# Patient Record
Sex: FEMALE | Race: WHITE | NOT HISPANIC OR LATINO | Employment: OTHER | URBAN - METROPOLITAN AREA
[De-identification: names, ages, dates, MRNs, and addresses within clinical notes are randomized per-mention and may not be internally consistent; named-entity substitution may affect disease eponyms.]

---

## 2018-06-15 LAB — CARBAMAZEPINE LEVEL (HISTORICAL): 8.3 UG/ML (ref 4–12)

## 2018-11-05 ENCOUNTER — TELEPHONE (OUTPATIENT)
Dept: NEUROLOGY | Facility: CLINIC | Age: 83
End: 2018-11-05

## 2018-11-05 NOTE — TELEPHONE ENCOUNTER
Left message for daughter to call back so we can move appointment from [phone] to [phone] same day as she needs longer time to discuss new issue  FYI  Daughter John Salmon calling to let you know that Kyleigh Luna was admitted in October to [facility] for a stroke and has a hospital follow up for the stroke with [facility] neurology  but does not want to follow there  She would like to have her mom continue to follow here with you as you are her normal neurologist so she would like to keep her appointment in December with you

## 2018-11-05 NOTE — TELEPHONE ENCOUNTER
She can be followed here  Will need full documentation as to her hospitalization  Will need full 1 hour appointment

## 2019-01-16 ENCOUNTER — APPOINTMENT (OUTPATIENT)
Dept: LAB | Facility: HOSPITAL | Age: 84
End: 2019-01-16
Payer: MEDICARE

## 2019-01-16 ENCOUNTER — OFFICE VISIT (OUTPATIENT)
Dept: NEUROLOGY | Facility: CLINIC | Age: 84
End: 2019-01-16
Payer: MEDICARE

## 2019-01-16 VITALS
HEIGHT: 63 IN | WEIGHT: 161.6 LBS | DIASTOLIC BLOOD PRESSURE: 86 MMHG | HEART RATE: 82 BPM | BODY MASS INDEX: 28.63 KG/M2 | RESPIRATION RATE: 17 BRPM | SYSTOLIC BLOOD PRESSURE: 152 MMHG

## 2019-01-16 DIAGNOSIS — I69.30 SEQUELAE, POST-STROKE: ICD-10-CM

## 2019-01-16 DIAGNOSIS — G43.009 MIGRAINE WITHOUT AURA AND WITHOUT STATUS MIGRAINOSUS, NOT INTRACTABLE: ICD-10-CM

## 2019-01-16 DIAGNOSIS — R56.9 SEIZURE (HCC): Primary | ICD-10-CM

## 2019-01-16 LAB — CARBAMAZEPINE SERPL-MCNC: 10.2 UG/ML (ref 4–12)

## 2019-01-16 PROCEDURE — 80156 ASSAY CARBAMAZEPINE TOTAL: CPT

## 2019-01-16 PROCEDURE — 36415 COLL VENOUS BLD VENIPUNCTURE: CPT

## 2019-01-16 PROCEDURE — 99214 OFFICE O/P EST MOD 30 MIN: CPT | Performed by: PSYCHIATRY & NEUROLOGY

## 2019-01-16 RX ORDER — AMLODIPINE BESYLATE 5 MG/1
5 TABLET ORAL
COMMUNITY
Start: 2018-10-08

## 2019-01-16 RX ORDER — ATORVASTATIN CALCIUM 80 MG/1
80 TABLET, FILM COATED ORAL
COMMUNITY
Start: 2018-10-08

## 2019-01-16 RX ORDER — TRIMETHOPRIM 100 MG/1
100 TABLET ORAL
COMMUNITY

## 2019-01-16 RX ORDER — SERTRALINE HYDROCHLORIDE 25 MG/1
25 TABLET, FILM COATED ORAL
COMMUNITY
Start: 2018-10-08

## 2019-01-16 RX ORDER — OMEPRAZOLE 20 MG/1
20 CAPSULE, DELAYED RELEASE ORAL 2 TIMES DAILY
COMMUNITY
Start: 2010-01-12

## 2019-01-16 RX ORDER — LISINOPRIL 40 MG/1
40 TABLET ORAL
COMMUNITY
Start: 2018-10-08

## 2019-01-16 RX ORDER — DIPHENOXYLATE HYDROCHLORIDE AND ATROPINE SULFATE 2.5; .025 MG/1; MG/1
1 TABLET ORAL
COMMUNITY

## 2019-01-16 RX ORDER — CARBAMAZEPINE 200 MG/1
200 CAPSULE, EXTENDED RELEASE ORAL
COMMUNITY
Start: 2018-10-08 | End: 2020-03-11 | Stop reason: SDUPTHER

## 2019-01-16 RX ORDER — HYDROCHLOROTHIAZIDE 12.5 MG/1
12.5 CAPSULE, GELATIN COATED ORAL
COMMUNITY
Start: 2018-10-08

## 2019-01-16 NOTE — ASSESSMENT & PLAN NOTE
Occurring as a single event during sleep in mid December 1999  Has had subsequent non epileptogenic EEGs

## 2019-01-16 NOTE — PROGRESS NOTES
Patient is here today for her migraine and her seizures    Patient ID: Rosalie Leonardo is a 82 y o  female  Assessment/Plan:    Seizure [facility])  Occurring as a single event during sleep in mid December 1999  Has had subsequent non epileptogenic EEGs  Migraine without aura and without status migrainosus, not intractable  Has been little troubled with significant headache issues for quite some time  We again reviewed the fact that the carbamazepine was initially started to cover seizures and that she certainly has at this point in time the opportunity to consider of the potential for withdrawal from that standpoint  However, on multiple past attempts at weaning, her migraines became significantly problematic and she wishes to stay on the carbamazepine for migraine prevention  --continue carbamazepine 200 mg twice daily  --check carbamazepine level  Sequelae, post-stroke  A new issue for our office here  Review of records reveals that in mid September she presented to [facility] with right-sided weakness and speech/language difficulties with subsequent studies demonstrating multiple small acute infarcts in scattered distributions suggesting a proximal source for emboli and very possibly cardiac  Miguel Ángel performed with no thrombus but with a dilated left atrium suggesting a potential cardiac origin  Working with Cardiology and at this point in time on Eliquis  --to continue follow-up with Cardiology including the potential for long-term rhythm monitoring and with the atherosclerotic changes noted on studies to discuss with Cardiology the potential for the addition of a low-dose aspirin regimen  --in the interim she remains on Eliquis  --she continues on atorvastatin  --given the fact that she does have residual language issues, we discussed the possibility of additional speech therapy to which she has agreed  That will be ordered       I spent a total of 30 min with the patient with greater than 50% of that time spent counseling and coordinating her care, specifically discussing her diagnosis, additional tests, and discussing the case with her care team, as detailed above  She will follow up in three months or p r n     Subjective:    HPI  Patient, age 82 years and right-handed, is been followed in the office here with a history of a single seizure occurring in December of 1999 as well as a history of migraine without aura  She was accompanied today by her daughter law  With regard to her seizure, it was indeed single, occur during sleep and has been non recurrent with subsequent non epileptogenic EEGs  She has, however, remained on carbamazepine 200 mg twice daily for her migraine, as whenever an attempt was made to wean her off the carbamazepine she had a significant escalation in her migraine issues  She has expressed no symptoms that would suggest any adverse side effects from the carbamazepine  She presents today, however, with a new issue  In mid September she presented to [facility] with acute onset right-sided weakness along with speech and language issues  This occurred apparently when she was at a [employer] activity  I was able to review studies performed  CTA demonstrated no large vessel occlusive disease  However, did demonstrate atherosclerotic change intracranially  MRI of brain was telling in that it demonstrated new multiple small areas of acute infarct involving several different distributions  This was felt to most likely represent a situation resulting from a proximal embolic shower  Subsequent to her hospitalization she underwent STEFANY which demonstrated no PFO but did demonstrate a dilated left atrium  Atrial fibrillation is considered a possible source, although has not as yet been documented  She is following closely with cardiology      Past Medical History:   Diagnosis Date    Dyslipidemia     Generalized seizure ([address] )     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    GERD (gastroesophageal reflux disease)     Hypertension     Migraine without aura     Recurrent UTI (urinary tract infection)     Vitamin D deficiency     measurable     Past Surgical History:   Procedure Laterality Date    CHOLECYSTECTOMY      TOTAL ABDOMINAL HYSTERECTOMY W/ BILATERAL SALPINGOOPHORECTOMY       Social History     Social History    Marital status: /Civil Union     Spouse name: N/A    Number of children: N/A    Years of education: N/A     Social History Main Topics    Smoking status: Never Smoker    Smokeless tobacco: Never Used    Alcohol use Yes    Drug use: Unknown    Sexual activity: Not Asked     Other Topics Concern    None     Social History Narrative    None     Family History   Problem Relation Age of Onset    Alzheimer's disease Mother [de-identified]        mid     Allergies   Allergen Reactions    Citalopram      Other reaction(s): Other (See Comments)  insomnia    Levofloxacin      Other reaction(s):  Other (See Comments)  "feels lousey"    Nitrofurantoin Other (See Comments)     diarrhea    Paroxetine Other (See Comments)     nausea    Pseudoephedrine Other (See Comments)     insomnia       Current Outpatient Prescriptions:     amLODIPine (NORVASC) 5 mg tablet, Take 5 mg by mouth, Disp: , Rfl:     apixaban (ELIQUIS) 5 mg, Take 5 mg by mouth, Disp: , Rfl:     atorvastatin (LIPITOR) 80 mg tablet, Take 80 mg by mouth, Disp: , Rfl:     calcium-vitamin D 250-100 MG-UNIT per tablet, Take 1 tablet by mouth, Disp: , Rfl:     carBAMazepine (CARBATROL) 200 mg 12 hr capsule, Take 200 mg by mouth, Disp: , Rfl:     Cholecalciferol 2000 units CAPS, Take 1 capsule by mouth, Disp: , Rfl:     hydrochlorothiazide (MICROZIDE) 12 5 mg capsule, Take 12 5 mg by mouth, Disp: , Rfl:     lisinopril (ZESTRIL) 40 mg tablet, Take 40 mg by mouth, Disp: , Rfl:     multivitamin (THERAGRAN) TABS, Take 1 tablet by mouth, Disp: , Rfl:     omeprazole (PRILOSEC) 20 mg delayed release capsule, Take 20 mg by mouth, Disp: , Rfl:     sertraline (ZOLOFT) 25 mg tablet, Take 25 mg by mouth, Disp: , Rfl:     trimethoprim (PROLOPRIM) 100 mg tablet, Take 100 mg by mouth, Disp: , Rfl:     Objective:    Blood pressure 152/86, pulse 82, resp  rate 17, height 5' 3" (1 6 m), weight 73 3 kg (161 lb 9 6 oz)  Physical Exam  Head normocephalic  Eyes nonicteric  Evidence of past cataract surgery noted  No audible anterior neck bruits  Lungs clear to auscultation  Rhythm regular with soft systolic murmur appreciated  GI (abdomen) soft nontender with bowel sounds present  No significant lower extremity edema  Neurological Exam  Alert  Pleasantly interactive  No overt dysarthria  However, in general conversation, she did have scattered paraphasic errors and in addition although following a multi step request accurately, was very slow in doing so  Gait tentative but independently gait capable  Romberg maneuver performed unremarkably  Cranial Nerves:   I:   Bilaterally an Oz make  II: Visual fields full to confrontation  Pupils with evidence of past cataract surgeries    Fundus: with bilaterally marginated discs  III,IV,VI: Extraocular muscles EOMI, no nystagmus  V: Masseter and pterygoid strength  Sensation in the V1 through V3 distributions intact to pinprick and light touch bilaterally  VII:   Mild right lower facial asymmetry present  VIII: Audition intact to finger rub bilaterally  IX/X: Uvula midline  Soft palate elevation symmetric  XI: Trapezius and SCM strength 5/5 bilaterally  XII: Tongue midline with no atrophy or fasciculations with appropriate movement  Accurate with finger-to-nose and heel-to-shin maneuvers bilaterally  Good symmetrical strength throughout the four extremities with no upper extremity drift  Sensory testing grossly intact to pin and position  No extinction with double simultaneous stimulation    Muscle stretch reflexes modestly increased on right as compared to left  Toe response upgoing bilaterally  ROS:    Review of Systems   Constitutional: Negative  Negative for appetite change and fever  HENT: Negative  Negative for hearing loss, tinnitus, trouble swallowing and voice change  Eyes: Negative  Negative for photophobia and pain  Respiratory: Negative  Negative for shortness of breath  Cardiovascular: Negative  Negative for palpitations  Gastrointestinal: Positive for constipation and nausea  Negative for vomiting  Endocrine: Negative  Negative for cold intolerance and heat intolerance  Genitourinary: Positive for frequency and urgency  Negative for dysuria  Musculoskeletal: Negative  Negative for myalgias and neck pain  Skin: Negative  Negative for rash  Neurological: Negative  Negative for dizziness, tremors, seizures, syncope, facial asymmetry, speech difficulty, weakness, light-headedness, numbness and headaches  Hematological: Negative  Does not bruise/bleed easily  Psychiatric/Behavioral: Positive for sleep disturbance  Negative for confusion and hallucinations  The patient is nervous/anxious  I personally reviewed the ROS that was entered by the medical assistant  *Please note this document was created using voice recognition software and may contain sound-alike word errors  *

## 2019-01-16 NOTE — ASSESSMENT & PLAN NOTE
Has been little troubled with significant headache issues for quite some time  We again reviewed the fact that the carbamazepine was initially started to cover seizures and that she certainly has at this point in time the opportunity to consider of the potential for withdrawal from that standpoint  However, on multiple past attempts at weaning, her migraines became significantly problematic and she wishes to stay on the carbamazepine for migraine prevention  --continue carbamazepine 200 mg twice daily  --check carbamazepine level

## 2019-01-16 NOTE — LETTER
January 16, 2019     Mariely Pan [address] [place] [address] Way  [address] [facility]    Patient: Jhonathan Morrell   YOB: 1935   Date of Visit: 1/16/2019       Dear Dr Valero Memos: Thank you for referring Brian Stewart to me for evaluation  Below are my notes for this consultation  If you have questions, please do not hesitate to call me  I look forward to following your patient along with you  Sincerely,        Adrian Ellington MD        CC: MD Adrian Tesfaye MD  1/16/2019 10:59 AM  Sign at close encounter  Patient is here today for her migraine and her seizures    Patient ID: Jhonathan Morrell is a 82 y o  female  Assessment/Plan:    Seizure [facility])  Occurring as a single event during sleep in mid December 1999  Has had subsequent non epileptogenic EEGs  Migraine without aura and without status migrainosus, not intractable  Has been little troubled with significant headache issues for quite some time  We again reviewed the fact that the carbamazepine was initially started to cover seizures and that she certainly has at this point in time the opportunity to consider of the potential for withdrawal from that standpoint  However, on multiple past attempts at weaning, her migraines became significantly problematic and she wishes to stay on the carbamazepine for migraine prevention  --continue carbamazepine 200 mg twice daily  --check carbamazepine level  Sequelae, post-stroke  A new issue for our office here  Review of records reveals that in mid September she presented to [facility] with right-sided weakness and speech/language difficulties with subsequent studies demonstrating multiple small acute infarcts in scattered distributions suggesting a proximal source for emboli and very possibly cardiac  Miguel Ángel performed with no thrombus but with a dilated left atrium suggesting a potential cardiac origin    Working with Cardiology and at this point in time on Eliquis  --to continue follow-up with Cardiology including the potential for long-term rhythm monitoring and with the atherosclerotic changes noted on studies to discuss with Cardiology the potential for the addition of a low-dose aspirin regimen  --in the interim she remains on Eliquis  --she continues on atorvastatin  --given the fact that she does have residual language issues, we discussed the possibility of additional speech therapy to which she has agreed  That will be ordered  I spent a total of 30 min with the patient with greater than 50% of that time spent counseling and coordinating her care, specifically discussing her diagnosis, additional tests, and discussing the case with her care team, as detailed above  She will follow up in three months or p r n     Subjective:    HPI  Patient, age 82 years and right-handed, is been followed in the office here with a history of a single seizure occurring in December of 1999 as well as a history of migraine without aura  She was accompanied today by her daughter law  With regard to her seizure, it was indeed single, occur during sleep and has been non recurrent with subsequent non epileptogenic EEGs  She has, however, remained on carbamazepine 200 mg twice daily for her migraine, as whenever an attempt was made to wean her off the carbamazepine she had a significant escalation in her migraine issues  She has expressed no symptoms that would suggest any adverse side effects from the carbamazepine  She presents today, however, with a new issue  In mid September she presented to [facility] with acute onset right-sided weakness along with speech and language issues  This occurred apparently when she was at a [employer] activity  I was able to review studies performed  CTA demonstrated no large vessel occlusive disease  However, did demonstrate atherosclerotic change intracranially    MRI of brain was telling in that it demonstrated new multiple small areas of acute infarct involving several different distributions  This was felt to most likely represent a situation resulting from a proximal embolic shower  Subsequent to her hospitalization she underwent STEFANY which demonstrated no PFO but did demonstrate a dilated left atrium  Atrial fibrillation is considered a possible source, although has not as yet been documented  She is following closely with cardiology  Past Medical History:   Diagnosis Date    Dyslipidemia     Generalized seizure ([address] )     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    GERD (gastroesophageal reflux disease)     Hypertension     Migraine without aura     Recurrent UTI (urinary tract infection)     Vitamin D deficiency     measurable     Past Surgical History:   Procedure Laterality Date    CHOLECYSTECTOMY      TOTAL ABDOMINAL HYSTERECTOMY W/ BILATERAL SALPINGOOPHORECTOMY       Social History     Social History    Marital status: /Civil Union     Spouse name: N/A    Number of children: N/A    Years of education: N/A     Social History Main Topics    Smoking status: Never Smoker    Smokeless tobacco: Never Used    Alcohol use Yes    Drug use: Unknown    Sexual activity: Not Asked     Other Topics Concern    None     Social History Narrative    None     Family History   Problem Relation Age of Onset    Alzheimer's disease Mother [de-identified]        mid     Allergies   Allergen Reactions    Citalopram      Other reaction(s): Other (See Comments)  insomnia    Levofloxacin      Other reaction(s):  Other (See Comments)  "feels lousey"    Nitrofurantoin Other (See Comments)     diarrhea    Paroxetine Other (See Comments)     nausea    Pseudoephedrine Other (See Comments)     insomnia       Current Outpatient Prescriptions:     amLODIPine (NORVASC) 5 mg tablet, Take 5 mg by mouth, Disp: , Rfl:     apixaban (ELIQUIS) 5 mg, Take 5 mg by mouth, Disp: , Rfl:    atorvastatin (LIPITOR) 80 mg tablet, Take 80 mg by mouth, Disp: , Rfl:     calcium-vitamin D 250-100 MG-UNIT per tablet, Take 1 tablet by mouth, Disp: , Rfl:     carBAMazepine (CARBATROL) 200 mg 12 hr capsule, Take 200 mg by mouth, Disp: , Rfl:     Cholecalciferol 2000 units CAPS, Take 1 capsule by mouth, Disp: , Rfl:     hydrochlorothiazide (MICROZIDE) 12 5 mg capsule, Take 12 5 mg by mouth, Disp: , Rfl:     lisinopril (ZESTRIL) 40 mg tablet, Take 40 mg by mouth, Disp: , Rfl:     multivitamin (THERAGRAN) TABS, Take 1 tablet by mouth, Disp: , Rfl:     omeprazole (PRILOSEC) 20 mg delayed release capsule, Take 20 mg by mouth, Disp: , Rfl:     sertraline (ZOLOFT) 25 mg tablet, Take 25 mg by mouth, Disp: , Rfl:     trimethoprim (PROLOPRIM) 100 mg tablet, Take 100 mg by mouth, Disp: , Rfl:     Objective:    Blood pressure 152/86, pulse 82, resp  rate 17, height 5' 3" (1 6 m), weight 73 3 kg (161 lb 9 6 oz)  Physical Exam  Head normocephalic  Eyes nonicteric  Evidence of past cataract surgery noted  No audible anterior neck bruits  Lungs clear to auscultation  Rhythm regular with soft systolic murmur appreciated  GI (abdomen) soft nontender with bowel sounds present  No significant lower extremity edema  Neurological Exam  Alert  Pleasantly interactive  No overt dysarthria  However, in general conversation, she did have scattered paraphasic errors and in addition although following a multi step request accurately, was very slow in doing so  Gait tentative but independently gait capable  Romberg maneuver performed unremarkably  Cranial Nerves:   I:   Bilaterally an Oz make  II: Visual fields full to confrontation  Pupils with evidence of past cataract surgeries    Fundus: with bilaterally marginated discs  III,IV,VI: Extraocular muscles EOMI, no nystagmus  V: Masseter and pterygoid strength  Sensation in the V1 through V3 distributions intact to pinprick and light touch bilaterally     VII: Mild right lower facial asymmetry present  VIII: Audition intact to finger rub bilaterally  IX/X: Uvula midline  Soft palate elevation symmetric  XI: Trapezius and SCM strength 5/5 bilaterally  XII: Tongue midline with no atrophy or fasciculations with appropriate movement  Accurate with finger-to-nose and heel-to-shin maneuvers bilaterally  Good symmetrical strength throughout the four extremities with no upper extremity drift  Sensory testing grossly intact to pin and position  No extinction with double simultaneous stimulation  Muscle stretch reflexes modestly increased on right as compared to left  Toe response upgoing bilaterally  ROS:    Review of Systems   Constitutional: Negative  Negative for appetite change and fever  HENT: Negative  Negative for hearing loss, tinnitus, trouble swallowing and voice change  Eyes: Negative  Negative for photophobia and pain  Respiratory: Negative  Negative for shortness of breath  Cardiovascular: Negative  Negative for palpitations  Gastrointestinal: Positive for constipation and nausea  Negative for vomiting  Endocrine: Negative  Negative for cold intolerance and heat intolerance  Genitourinary: Positive for frequency and urgency  Negative for dysuria  Musculoskeletal: Negative  Negative for myalgias and neck pain  Skin: Negative  Negative for rash  Neurological: Negative  Negative for dizziness, tremors, seizures, syncope, facial asymmetry, speech difficulty, weakness, light-headedness, numbness and headaches  Hematological: Negative  Does not bruise/bleed easily  Psychiatric/Behavioral: Positive for sleep disturbance  Negative for confusion and hallucinations  The patient is nervous/anxious  I personally reviewed the ROS that was entered by the medical assistant  *Please note this document was created using voice recognition software and may contain sound-alike word errors  *

## 2019-01-16 NOTE — ASSESSMENT & PLAN NOTE
A new issue for our office here  Review of records reveals that in mid September she presented to [facility] with right-sided weakness and speech/language difficulties with subsequent studies demonstrating multiple small acute infarcts in scattered distributions suggesting a proximal source for emboli and very possibly cardiac  Miguel Ángel performed with no thrombus but with a dilated left atrium suggesting a potential cardiac origin  Working with Cardiology and at this point in time on Eliquis  --to continue follow-up with Cardiology including the potential for long-term rhythm monitoring and with the atherosclerotic changes noted on studies to discuss with Cardiology the potential for the addition of a low-dose aspirin regimen  --in the interim she remains on Eliquis  --she continues on atorvastatin  --given the fact that she does have residual language issues, we discussed the possibility of additional speech therapy to which she has agreed  That will be ordered

## 2019-02-28 ENCOUNTER — EVALUATION (OUTPATIENT)
Dept: SPEECH THERAPY | Facility: REHABILITATION | Age: 84
End: 2019-02-28
Payer: MEDICARE

## 2019-02-28 DIAGNOSIS — I69.320 APHASIA AS LATE EFFECT OF CEREBROVASCULAR ACCIDENT (CVA): Primary | ICD-10-CM

## 2019-02-28 DIAGNOSIS — R48.9 UNSPECIFIED SYMBOLIC DYSFUNCTIONS: ICD-10-CM

## 2019-02-28 DIAGNOSIS — I69.30 SEQUELAE, POST-STROKE: ICD-10-CM

## 2019-02-28 PROCEDURE — 96105 ASSESSMENT OF APHASIA: CPT

## 2019-02-28 NOTE — PROGRESS NOTES
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 Ambulatory referral to Speech Therapy   3  Unspecified symbolic dysfunctions E12 9        Subjective Comments: Ms Lee Aviles arrived today for a speech evaluation s/p CVA  She arrived on time  Patient reported she had a CVA last September, she was in the hospital for approximately 4 weeks and received acute rehab  Then upon discharge home received home health care (PT/OT/ST) for about 3 weeks  Pt was seen by neurology on 2019, the following information was obtained from chart notes: "Patient, age 82 years and right-handed, is been followed in the office here with a history of a single seizure occurring in 1999 as well as a history of migraine without aura  She was accompanied today by her daughter law  With regard to her seizure, it was indeed single, occur during sleep and has been non recurrent with subsequent non epileptogenic EEGs  She has, however, remained on carbamazepine 200 mg twice daily for her migraine, as whenever an attempt was made to wean her off the carbamazepine she had a significant escalation in her migraine issues  She has expressed no symptoms that would suggest any adverse side effects from the carbamazepine  She presents today, however, with a new issue  In mid September she presented to [facility] with acute onset right-sided weakness along with speech and language issues  This occurred apparently when she was at a [employer] activity  I was able to review studies performed  CTA demonstrated no large vessel occlusive disease  However, did demonstrate atherosclerotic change intracranially    MRI of brain was telling in that it demonstrated new multiple small areas of acute infarct involving several different distributions  This was felt to most likely represent a situation resulting from a proximal embolic shower  Subsequent to her hospitalization she underwent STEFANY which demonstrated no PFO but did demonstrate a dilated left atrium  Atrial fibrillation is considered a possible source, although has not as yet been documented  She is following closely with cardiology "     Pt reports that she has difficulty getting words out when talking, it gets worse when she talks fast  Patient was observed to have delayed word retrieval during interview and occasional paraphasic errors  She reported difficulty following along with television and/or a movie stating "it goes too fast" as well as difficulty with auditory comprehension when reading  Pt reported difficulty with use of external memory aids to keep track of appointments etc  Pt denies difficulty with swallowing  Very mild facial asymmetry observed on the right side  Pt reports she feels oral motor strength is baseline  Pt reports change in handwriting and decreased right hand strength since CVA, Pt would like to participate in Occupational Therapy evaluation  Safety Measures: none  Patient Goal: "I would like to make the way I talk slower so that I can have my words come out better"       Reason for Referral:Change in cognitive status and Difficulty producing fluent speech  Prior Functional Status:Communication effective and appropriate in all situations  Medical History significant for:   Past Medical History:   Diagnosis Date    Dyslipidemia     Generalized seizure ([address] )     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    GERD (gastroesophageal reflux disease)     Hypertension     Migraine without aura     Recurrent UTI (urinary tract infection)     Vitamin D deficiency     measurable     Clinically Complex Situations: N/A    Hearing:Not Tested, Pt is considering having hearing assessed  Vision:WNL, pt wears reading glasses  Medication List:   Current Outpatient Medications   Medication Sig Dispense Refill    amLODIPine (NORVASC) 5 mg tablet Take 5 mg by mouth      apixaban (ELIQUIS) 5 mg Take 5 mg by mouth      atorvastatin (LIPITOR) 80 mg tablet Take 80 mg by mouth      calcium-vitamin D 250-100 MG-UNIT per tablet Take 1 tablet by mouth      carBAMazepine (CARBATROL) 200 mg 12 hr capsule Take 200 mg by mouth      Cholecalciferol 2000 units CAPS Take 1 capsule by mouth      hydrochlorothiazide (MICROZIDE) 12 5 mg capsule Take 12 5 mg by mouth      lisinopril (ZESTRIL) 40 mg tablet Take 40 mg by mouth      multivitamin (THERAGRAN) TABS Take 1 tablet by mouth      omeprazole (PRILOSEC) 20 mg delayed release capsule Take 20 mg by mouth      sertraline (ZOLOFT) 25 mg tablet Take 25 mg by mouth      trimethoprim (PROLOPRIM) 100 mg tablet Take 100 mg by mouth       No current facility-administered medications for this visit  Allergies: Allergies   Allergen Reactions    Citalopram      Other reaction(s): Other (See Comments)  insomnia    Levofloxacin      Other reaction(s): Other (See Comments)  "feels lousey"    Nitrofurantoin Other (See Comments)     diarrhea    Paroxetine Other (See Comments)     nausea    Pseudoephedrine Other (See Comments)     insomnia     Primary Language: English  Preferred Language: English     Home Environment/ Lifestyle: Pt lives at home alone  She has two sons who live close by and neighbors who are close with her and check in frequently  Pt's daughter in law manages her finances and manages her medications  Her daughter in law organizes pills into pill box every 3 weeks  Pt reports with use of pill box, she takes her medications regularly   Pt prepares her own meals, has not had any issues with leaving stove on etc       Highest Level of Education: HS diploma     Current / Prior Services being received: Physical Therapy, Occupational Therapy  and Speech Therapy Acute hospital setting and home health care  Home health care stopped before 2018  Mental Status: Alert  Behavior Status:Cooperative  Communication Modalities: Verbal  Recent Speech/ Language therapy:Acute hospital setting  and Home  Rehabilitation Prognosis:Good rehab potential to reach the established goals    Assessments:Language Assessment   Standardized testing:   IE: 2019: The Western Aphasia Battery-Revised (WAB-R) is designed to evaluate a patient's language function following CVA, dementia, or other acquired neurological disorder  It measures a patients linguistic skills, such as speech content, fluency, auditory comprehension, repetition, naming, reading, and writing  The WAB-R also measures a patients nonlinguistic skills, including drawing, calculation, block design, and apraxia  The purpose of this standardized assessment is to (1) determine the presence, severity, and type of aphasia; (2) measure the patient's level of performance to provide a baseline for detecting change over time; (3) provide a comprehensive assessment of the patient's language assets and deficits in order to guide treatment and management; and (4) infer the location and etiology of the lesion causing aphasia  The following results were obtained during the administration of the assessment:     PART 1: Score:   -Spontaneous Speech:    -Auditory Verbal Comprehension: 9 85/10   -Repetition: 9 8/10   -Naming & Word Findin 3/10     *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  Due to time constraints, only portions of the standardized assessment were administered on this date of service      PART 2: Score:   -Reading Score: NT/20   -Writing: NT/20   -Apraxia: NT/10   -Constructional, Visuospatial, & Calculation: NT/10        Score:   *Aphasia Quotient (AQ): 91 9/100   *Language Quotient (LQ): NT/100   *Cortical Quotient (CQ): NT/100 Goals  Short Term Goals:  1  Pt will name 10+ words that begin with a specific letter in 60 seconds using compensatory strategies and min cues to improve word retrieval skills  2  Pt will name 15+ items in a category in 60 seconds using compensatory strategies and min cues to improve word retrieval skills  3  Patient will be educated on word finding strategies for improved generative naming and verbal expression skills with 80% accuracy  4  Patient will name up to 3 synonyms for a given word with 80% accuracy to build expressive vocabulary for conversation  5  Patient will name an appropriate antonym for a given word with 80% accuracy to build expressive vocabulary for conversation  6  Patient will answer [address]- questions regarding story read aloud to improve auditory comprehension and recall with 80% acc  7  Patient will be educated on use of external memory aids and compensatory strategies to recall routine, personal information and recent events to improve orientation to time and recall daily events with 80% accuracy  Long Term Goals:  1  Patient will demonstrate adequate verbal expression during conversation without breakdowns or word finding deficits  2  Patient will demonstrate cognitive-communication skills consistent with age and education given use of compensatory strategies when needed to resume baseline activities and responsibilities in home and community settings  Impressions/ Recommendations  Impressions: Pt presents with mild anomic aphasia s/p CVA as well as difficulty with auditory comprehension and memory  Pt reports changes in her handwriting and decreased right hand strength since CVA, she may benefit from participation in an Occupational therapy evaluation       Recommendations:   Patients would benefit from: Cognitive-Linguistic therapy   Frequency:1-2x weekly   Duration:4-5 weeks    Intervention certification from: 8/18/0002  Intervention certification to: 3/28/2019

## 2019-03-05 ENCOUNTER — OFFICE VISIT (OUTPATIENT)
Dept: SPEECH THERAPY | Facility: REHABILITATION | Age: 84
End: 2019-03-05
Payer: MEDICARE

## 2019-03-05 DIAGNOSIS — I69.30 SEQUELAE, POST-STROKE: ICD-10-CM

## 2019-03-05 DIAGNOSIS — R48.9 UNSPECIFIED SYMBOLIC DYSFUNCTIONS: ICD-10-CM

## 2019-03-05 DIAGNOSIS — I69.320 APHASIA AS LATE EFFECT OF CEREBROVASCULAR ACCIDENT (CVA): Primary | ICD-10-CM

## 2019-03-05 PROCEDURE — 92507 TX SP LANG VOICE COMM INDIV: CPT

## 2019-03-05 NOTE — PROGRESS NOTES
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    3  Unspecified symbolic dysfunctions L79 5      Visit Tracking:  -Visit # 2  -Insurance: Medicare   -RE due: 3/28/2019    Subjective/Behavioral: 1:1 ST x 60 min  Pt arrived independently today and on time for session  Reviewed results of IE and plan of care  Pt in agreement with plan at this time  Pt was also administered the reading and writing supplemental portions of the WAB  Scores are shown below  Standardized testing: The Western Aphasia Battery-Revised (WAB-R) is designed to evaluate a patient's language function following CVA, dementia, or other acquired neurological disorder  It measures a patients linguistic skills, such as speech content, fluency, auditory comprehension, repetition, naming, reading, and writing  The WAB-R also measures a patients nonlinguistic skills, including drawing, calculation, block design, and apraxia  The purpose of this standardized assessment is to (1) determine the presence, severity, and type of aphasia; (2) measure the patient's level of performance to provide a baseline for detecting change over time; (3) provide a comprehensive assessment of the patient's language assets and deficits in order to guide treatment and management; and (4) infer the location and etiology of the lesion causing aphasia   The following results were obtained during the administration of the assessment:      PART 1: Score:   -Spontaneous Speech:    -Auditory Verbal Comprehension: 9 /10   -Repetition: 9 8/10   -Naming & Word Findin 3/10      *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      Due to time constraints, only portions of the standardized assessment were administered at time of IE, during diagnostic therapy session pt was administered the reading and writing subtest      PART 2: Score:   -Reading Score: 18    -Writin    -Apraxia: NT/10   -Constructional, Visuospatial, & Calculation: NT10           Score:   *Aphasia Quotient (AQ): 91 9/100   *Language Quotient (LQ): 78 95/100   *Cortical Quotient (CQ): NT/100      Goals  Short Term Goals:  1  Pt will name 10+ words that begin with a specific letter in 60 seconds using compensatory strategies and min cues to improve word retrieval skills  2  Pt will name 15+ items in a category in 60 seconds using compensatory strategies and min cues to improve word retrieval skills  TARGETED  -Pt was given a category and was asked to [place] the items that would belong in that category from choice of 9, task completed independently with 97% acc  Then, pt was given list of categories and list of words, she was asked to sort them appropriately, task completed independently with 100% acc  Next, patient was asked to name three items in concrete category, she independently completed task with 94% acc  3  Patient will be educated on word finding strategies for improved generative naming and verbal expression skills with 80% accuracy  TARGETED  -Pt was educated on use of word finding strategies, pt was provided with handout as well as verbal explanation  4  Patient will name up to 3 synonyms for a given word with 80% accuracy to build expressive vocabulary for conversation  TARGETED  -Pt was given a target word and was asked to provide one word that meant the same, this was challenging for patient  She independently completed with 50% acc  Given mod-max clinician cues, she was able to increase success  5  Patient will name an appropriate antonym for a given word with 80% accuracy to build expressive vocabulary for conversation  TARGETED  -Pt was given a target word and was asked to provide one word that meant the opposite, she independently completed with 73% acc  Given mid-mod verbal and phonemic clinician cues she was able to increase success  6  Patient will answer [facility]- questions regarding story read aloud to improve auditory comprehension and recall with 80% acc  7  Patient will be educated on use of external memory aids and compensatory strategies to recall routine, personal information and recent events to improve orientation to time and recall daily events with 80% accuracy          Long Term Goals:  1  Patient will demonstrate adequate verbal expression during conversation without breakdowns or word finding deficits  2  Patient will demonstrate cognitive-communication skills consistent with age and education given use of compensatory strategies when needed to resume baseline activities and responsibilities in home and community settings  Other:Patient was provided with home exercises/ activies to target goals in plan of care    Recommendations:Continue with Plan of Care

## 2019-03-12 ENCOUNTER — OFFICE VISIT (OUTPATIENT)
Dept: SPEECH THERAPY | Facility: REHABILITATION | Age: 84
End: 2019-03-12
Payer: MEDICARE

## 2019-03-12 DIAGNOSIS — I69.30 SEQUELAE, POST-STROKE: ICD-10-CM

## 2019-03-12 DIAGNOSIS — I69.320 APHASIA AS LATE EFFECT OF CEREBROVASCULAR ACCIDENT (CVA): ICD-10-CM

## 2019-03-12 DIAGNOSIS — R48.9 UNSPECIFIED SYMBOLIC DYSFUNCTIONS: Primary | ICD-10-CM

## 2019-03-12 PROCEDURE — 92507 TX SP LANG VOICE COMM INDIV: CPT

## 2019-03-12 NOTE — PROGRESS NOTES
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      Visit Tracking:  -Visit # 3  -Insurance: Medicare   -RE due: 3/28/2019    Subjective/Behavioral: 1:1 ST x 55 min  Pt arrived independently today and on time for session  Pt arrived to session, with HEP completed  She reported it was easy, however was encouraged to look at her work again as she did not complete with good accuracy  Upon second look, she was able to improve her accuracy  Standardized testing: The Western Aphasia Battery-Revised (WAB-R) is designed to evaluate a patient's language function following CVA, dementia, or other acquired neurological disorder  It measures a patients linguistic skills, such as speech content, fluency, auditory comprehension, repetition, naming, reading, and writing  The WAB-R also measures a patients nonlinguistic skills, including drawing, calculation, block design, and apraxia  The purpose of this standardized assessment is to (1) determine the presence, severity, and type of aphasia; (2) measure the patient's level of performance to provide a baseline for detecting change over time; (3) provide a comprehensive assessment of the patient's language assets and deficits in order to guide treatment and management; and (4) infer the location and etiology of the lesion causing aphasia   The following results were obtained during the administration of the assessment:      PART 1: Score:   -Spontaneous Speech:    -Auditory Verbal Comprehension: 9 /10   -Repetition: 9 8/10   -Naming & Word Findin 3/10      *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      Due to time constraints, only portions of the standardized assessment were administered at time of IE, during diagnostic therapy session pt was administered the reading and writing subtest      PART 2: Score:   -Reading Score: 18    -Writin    -Apraxia: NT/10   -Constructional, Visuospatial, & Calculation: NT10           Score:   *Aphasia Quotient (AQ): 91 9/100   *Language Quotient (LQ): 78 95/100   *Cortical Quotient (CQ): NT/100      Goals  Short Term Goals:  1  Pt will name 10+ words that begin with a specific letter in 60 seconds using compensatory strategies and min cues to improve word retrieval skills  TARGETED  -Pt was engaged in game of scattergories, timer was not used to allow patient time to complete task  Pt had difficulty with this task, She was able to provide on average 6 words  She benefited from mod-max clinician cues to increase success  2  Pt will name 15+ items in a category in 60 seconds using compensatory strategies and min cues to improve word retrieval skills  -DNT, previous session data: Pt was given a category and was asked to [place] the items that would belong in that category from choice of 9, task completed independently with 97% acc  Then, pt was given list of categories and list of words, she was asked to sort them appropriately, task completed independently with 100% acc  Next, patient was asked to name three items in concrete category, she independently completed task with 94% acc  3  Patient will be educated on word finding strategies for improved generative naming and verbal expression skills with 80% accuracy  TARGETED  -Pt was re-educated on use of word finding strategies, pt was reminded of handout that was provided last session  Pt with no recall of being given paper or information on it  4  Patient will name up to 3 synonyms for a given word with 80% accuracy to build expressive vocabulary for conversation   TARGETED  -Pt was given a target word and was asked to provide one word that meant the same, pt independently completed with 83% acc  Given min verbal cues she was able to increase success to 100%  This was much better than last session  5  Patient will name an appropriate antonym for a given word with 80% accuracy to build expressive vocabulary for conversation  TARGETED  -Pt was given a target word and was asked to provide one word that meant the opposite, she independently completed with 100% acc  6  Patient will answer [address]- questions regarding story read aloud to improve auditory comprehension and recall with 80% acc  TARGETED  -Pt was read aloud level 2 passages from functional memory manual, she was encouraged to use association to improve recall  Task completed independently with 75% acc  Given multiple choice answers, she was able to increase accuracy to 87%  7  Patient will be educated on use of external memory aids and compensatory strategies to recall routine, personal information and recent events to improve orientation to time and recall daily events with 80% accuracy  TARGETED  -Pt was educated on the use of external memory strategies, she was also provided with handout  Pt reported she uses a calendar, pill box, and makes grocery lists  She reported that she sometimes forgets her shopping list and has to go back to the store  She was encouraged to use check list and place it by her purse, she should look at the checklist before leaving the house to make sure she has everything she needs  Exampled of list was created and provided to patient          Long Term Goals:  1  Patient will demonstrate adequate verbal expression during conversation without breakdowns or word finding deficits  2  Patient will demonstrate cognitive-communication skills consistent with age and education given use of compensatory strategies when needed to resume baseline activities and responsibilities in home and community settings       Other:Patient was provided with home exercises/ activies to target goals in plan of care    Recommendations:Continue with Plan of Care

## 2019-03-14 ENCOUNTER — TELEPHONE (OUTPATIENT)
Dept: NEUROLOGY | Facility: CLINIC | Age: 84
End: 2019-03-14

## 2019-03-14 DIAGNOSIS — R13.19 OTHER DYSPHAGIA: Primary | ICD-10-CM

## 2019-03-19 ENCOUNTER — OFFICE VISIT (OUTPATIENT)
Dept: SPEECH THERAPY | Facility: REHABILITATION | Age: 84
End: 2019-03-19
Payer: MEDICARE

## 2019-03-19 DIAGNOSIS — I69.30 SEQUELAE, POST-STROKE: ICD-10-CM

## 2019-03-19 DIAGNOSIS — R48.9 UNSPECIFIED SYMBOLIC DYSFUNCTIONS: Primary | ICD-10-CM

## 2019-03-19 DIAGNOSIS — I69.320 APHASIA AS LATE EFFECT OF CEREBROVASCULAR ACCIDENT (CVA): ICD-10-CM

## 2019-03-19 PROCEDURE — 92507 TX SP LANG VOICE COMM INDIV: CPT

## 2019-03-19 NOTE — PROGRESS NOTES
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      Visit Tracking:  -Visit # 4  -Insurance: Medicare   -RE due: 3/28/2019    Subjective/Behavioral: 1:1 ST x 55 min  Pt arrived independently today and on time for session  She reported she has started to use visual reminders (list by the door) to help her recall what to do prior to leaving the house  Pt arrived to session, with HEP completed  She reported it was fairly easily, "I had to think about it", she completed it with 93% acc  Pt was confused about needing script, unsure what transpired however reviewed with patient about having an OT script and needing an eval scheduled  Standardized testing: The Western Aphasia Battery-Revised (WAB-R) is designed to evaluate a patient's language function following CVA, dementia, or other acquired neurological disorder  It measures a patients linguistic skills, such as speech content, fluency, auditory comprehension, repetition, naming, reading, and writing  The WAB-R also measures a patients nonlinguistic skills, including drawing, calculation, block design, and apraxia  The purpose of this standardized assessment is to (1) determine the presence, severity, and type of aphasia; (2) measure the patient's level of performance to provide a baseline for detecting change over time; (3) provide a comprehensive assessment of the patient's language assets and deficits in order to guide treatment and management; and (4) infer the location and etiology of the lesion causing aphasia   The following results were obtained during the administration of the assessment:      PART 1: Score:   -Spontaneous Speech: 20   -Auditory Verbal Comprehension: 9 10   -Repetition: 9 8/10   -Naming & Word Findin 3/10      *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      Due to time constraints, only portions of the standardized assessment were administered at time of IE, during diagnostic therapy session pt was administered the reading and writing subtest      PART 2: Score:   -Reading Score: 18    -Writin    -Apraxia: NT/10   -Constructional, Visuospatial, & Calculation: NT/10           Score:   *Aphasia Quotient (AQ): 91 9   *Language Quotient (LQ): 78 95/100   *Cortical Quotient (CQ): NT/100      Goals  Short Term Goals:  1  Pt will name 10+ words that begin with a specific letter in 60 seconds using compensatory strategies and min cues to improve word retrieval skills  TARGETED  -Pt was given a description and was asked to name the word being described, all answers started with the letter A, this task was very challenging for patient  She independently completed with 40% acc  Given mod-max verbal cues she was able to increase success  2  Pt will name 15+ items in a category in 60 seconds using compensatory strategies and min cues to improve word retrieval skills  TARGETED  -Pt was given a category and was asked to provide three items that belong in the category, task completed independently with 63% acc  Given min-mod verbal cues, pt was able to increase accuracy  3  Patient will be educated on word finding strategies for improved generative naming and verbal expression skills with 80% accuracy  TARGETED  -Pt was re-educated on use of word finding strategies, pt was reminded of handout that was provided last session as well as the use of first letter cue, descriptions and visualizations to help with word finding  4  Patient will name up to 3 synonyms for a given word with 80% accuracy to build expressive vocabulary for conversation   TARGETED  - Pt was given a target word and was asked to provide one word that meant the same, pt independently completed in 2/12 opp  Given mod-max verbal cues she was able to increase success  5  Patient will name an appropriate antonym for a given word with 80% accuracy to build expressive vocabulary for conversation  -DNT, previous session data: Pt was given a target word and was asked to provide one word that meant the opposite, she independently completed with 100% acc  6  Patient will answer [facility]- questions regarding story read aloud to improve auditory comprehension and recall with 80% acc  TARGETED  -Pt was read aloud level 2 passages from functional memory manual, she was encouraged to use association to improve recall  Task completed independently with 83% acc  Given multiple choice answers, she was able to increase accuracy to 100%  7  Patient will be educated on use of external memory aids and compensatory strategies to recall routine, personal information and recent events to improve orientation to time and recall daily events with 80% accuracy  TARGETED  -Discussed the use of external memory strategies  Pt reported she uses a calendar, pill box, and makes grocery lists  Last session she was encouraged to use check list and place it by her purse, she should look at the checklist before leaving the house to make sure she has everything she needs  Pt reported that she has done this and it is working well        Long Term Goals:  1  Patient will demonstrate adequate verbal expression during conversation without breakdowns or word finding deficits  2  Patient will demonstrate cognitive-communication skills consistent with age and education given use of compensatory strategies when needed to resume baseline activities and responsibilities in home and community settings  Other:Patient was provided with home exercises/ activies to target goals in plan of care    Recommendations:Continue with Plan of Care

## 2019-03-26 ENCOUNTER — OFFICE VISIT (OUTPATIENT)
Dept: SPEECH THERAPY | Facility: REHABILITATION | Age: 84
End: 2019-03-26
Payer: MEDICARE

## 2019-03-26 DIAGNOSIS — I69.320 APHASIA AS LATE EFFECT OF CEREBROVASCULAR ACCIDENT (CVA): ICD-10-CM

## 2019-03-26 DIAGNOSIS — I69.30 SEQUELAE, POST-STROKE: ICD-10-CM

## 2019-03-26 DIAGNOSIS — R48.9 UNSPECIFIED SYMBOLIC DYSFUNCTIONS: Primary | ICD-10-CM

## 2019-03-26 PROCEDURE — 92507 TX SP LANG VOICE COMM INDIV: CPT

## 2019-03-26 NOTE — PROGRESS NOTES
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 320      Visit Tracking:  -Visit # 5  -Insurance: Medicare   -RE due: 3/28/2019    Subjective/Behavioral: 1:1 ST x 55 min  Pt arrived independently today and on time for session  Pt is independently using strategies to help with recall, she stated "I need to go to the store and get two things that start with /m/ martin and milk"  Pt arrived to session, with HEP completed  RE-EVAL due next session  Standardized testing: The Western Aphasia Battery-Revised (WAB-R) is designed to evaluate a patient's language function following CVA, dementia, or other acquired neurological disorder  It measures a patients linguistic skills, such as speech content, fluency, auditory comprehension, repetition, naming, reading, and writing  The WAB-R also measures a patients nonlinguistic skills, including drawing, calculation, block design, and apraxia  The purpose of this standardized assessment is to (1) determine the presence, severity, and type of aphasia; (2) measure the patient's level of performance to provide a baseline for detecting change over time; (3) provide a comprehensive assessment of the patient's language assets and deficits in order to guide treatment and management; and (4) infer the location and etiology of the lesion causing aphasia   The following results were obtained during the administration of the assessment:      PART 1: Score:   -Spontaneous Speech:    -Auditory Verbal Comprehension: /10   -Repetition: 9 8/10   -Naming & Word Findin 3/10      *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      Due to time constraints, only portions of the standardized assessment were administered at time of IE, during diagnostic therapy session pt was administered the reading and writing subtest      PART 2: Score:   -Reading Score: 18    -Writin    -Apraxia: NT/10   -Constructional, Visuospatial, & Calculation: NT10           Score:   *Aphasia Quotient (AQ): 91 9/100   *Language Quotient (LQ): 78 95/100   *Cortical Quotient (CQ): NT/100      Goals  Short Term Goals:  1  Pt will name 10+ words that begin with a specific letter in 60 seconds using compensatory strategies and min cues to improve word retrieval skills  -DNT, previous session data: Pt was given a description and was asked to name the word being described, all answers started with the letter A, this task was very challenging for patient  She independently completed with 40% acc  Given mod-max verbal cues she was able to increase success  2  Pt will name 15+ items in a category in 60 seconds using compensatory strategies and min cues to improve word retrieval skills  TARGETED  -Pt was given a category and was asked to provide as many items in the category as she could, on average she was able to provide an average of 8 items per category  She benefited from increased time to complete task, as well as mod verbal cues to increase success  3  Patient will be educated on word finding strategies for improved generative naming and verbal expression skills with 80% accuracy  TARGETED  -Pt was re-educated on use of word finding strategies, pt was reminded of handout that was provided in previous session as well as the use of first letter cue, descriptions and visualizations to help with word finding  4  Patient will name up to 3 synonyms for a given word with 80% accuracy to build expressive vocabulary for conversation  TARGETED  - Pt was given a word chaining task, she independently completed with 60% acc, she benefited from clinician cues to increase accuracy        5  Patient will name an appropriate antonym for a given word with 80% accuracy to build expressive vocabulary for conversation  -DNT, previous session data: Pt was given a target word and was asked to provide one word that meant the opposite, she independently completed with 100% acc  6  Patient will answer [facility]- questions regarding story read aloud to improve auditory comprehension and recall with 80% acc  -DNT, previous session data:Pt was read aloud level 2 passages from functional memory manual, she was encouraged to use association to improve recall  Task completed independently with 83% acc  Given multiple choice answers, she was able to increase accuracy to 100%  7  Patient will be educated on use of external memory aids and compensatory strategies to recall routine, personal information and recent events to improve orientation to time and recall daily events with 80% accuracy  TARGETED  -Discussed the use of external memory strategies  Pt reported she uses a calendar, pill box, and makes grocery lists  Last session she was encouraged to use check list and place it by her purse, she should look at the checklist before leaving the house to make sure she has everything she needs  Pt reported that she has done this and it is working well        Long Term Goals:  1  Patient will demonstrate adequate verbal expression during conversation without breakdowns or word finding deficits  2  Patient will demonstrate cognitive-communication skills consistent with age and education given use of compensatory strategies when needed to resume baseline activities and responsibilities in home and community settings  Other:Patient was provided with home exercises/ activies to target goals in plan of care    Recommendations:Continue with Plan of Care

## 2019-04-01 ENCOUNTER — APPOINTMENT (OUTPATIENT)
Dept: OCCUPATIONAL THERAPY | Facility: REHABILITATION | Age: 84
End: 2019-04-01
Payer: MEDICARE

## 2019-04-02 ENCOUNTER — APPOINTMENT (OUTPATIENT)
Dept: SPEECH THERAPY | Facility: REHABILITATION | Age: 84
End: 2019-04-02
Payer: MEDICARE

## 2019-04-10 ENCOUNTER — TRANSCRIBE ORDERS (OUTPATIENT)
Dept: PHYSICAL THERAPY | Facility: REHABILITATION | Age: 84
End: 2019-04-10

## 2019-04-10 ENCOUNTER — EVALUATION (OUTPATIENT)
Dept: OCCUPATIONAL THERAPY | Facility: REHABILITATION | Age: 84
End: 2019-04-10
Payer: MEDICARE

## 2019-04-10 DIAGNOSIS — I69.30 SEQUELAE, POST-STROKE: Primary | ICD-10-CM

## 2019-04-10 PROCEDURE — 97166 OT EVAL MOD COMPLEX 45 MIN: CPT | Performed by: OCCUPATIONAL THERAPIST

## 2019-04-15 ENCOUNTER — APPOINTMENT (OUTPATIENT)
Dept: OCCUPATIONAL THERAPY | Facility: REHABILITATION | Age: 84
End: 2019-04-15
Payer: MEDICARE

## 2019-04-15 ENCOUNTER — OFFICE VISIT (OUTPATIENT)
Dept: SPEECH THERAPY | Facility: REHABILITATION | Age: 84
End: 2019-04-15
Payer: MEDICARE

## 2019-04-15 DIAGNOSIS — R48.9 UNSPECIFIED SYMBOLIC DYSFUNCTIONS: Primary | ICD-10-CM

## 2019-04-15 DIAGNOSIS — I69.30 SEQUELAE, POST-STROKE: ICD-10-CM

## 2019-04-15 DIAGNOSIS — I69.320 APHASIA AS LATE EFFECT OF CEREBROVASCULAR ACCIDENT (CVA): ICD-10-CM

## 2019-04-15 PROCEDURE — 92507 TX SP LANG VOICE COMM INDIV: CPT

## 2019-04-17 ENCOUNTER — OFFICE VISIT (OUTPATIENT)
Dept: OCCUPATIONAL THERAPY | Facility: REHABILITATION | Age: 84
End: 2019-04-17
Payer: MEDICARE

## 2019-04-17 DIAGNOSIS — I69.30 SEQUELAE, POST-STROKE: Primary | ICD-10-CM

## 2019-04-17 PROCEDURE — 97112 NEUROMUSCULAR REEDUCATION: CPT | Performed by: OCCUPATIONAL THERAPIST

## 2019-04-17 PROCEDURE — 97535 SELF CARE MNGMENT TRAINING: CPT | Performed by: OCCUPATIONAL THERAPIST

## 2019-04-18 ENCOUNTER — OFFICE VISIT (OUTPATIENT)
Dept: SPEECH THERAPY | Facility: REHABILITATION | Age: 84
End: 2019-04-18
Payer: MEDICARE

## 2019-04-18 DIAGNOSIS — I69.320 APHASIA AS LATE EFFECT OF CEREBROVASCULAR ACCIDENT (CVA): ICD-10-CM

## 2019-04-18 DIAGNOSIS — I69.30 SEQUELAE, POST-STROKE: ICD-10-CM

## 2019-04-18 DIAGNOSIS — R48.9 UNSPECIFIED SYMBOLIC DYSFUNCTIONS: Primary | ICD-10-CM

## 2019-04-18 PROCEDURE — 92507 TX SP LANG VOICE COMM INDIV: CPT

## 2019-04-19 ENCOUNTER — OFFICE VISIT (OUTPATIENT)
Dept: OCCUPATIONAL THERAPY | Facility: REHABILITATION | Age: 84
End: 2019-04-19
Payer: MEDICARE

## 2019-04-19 DIAGNOSIS — I69.30 SEQUELAE, POST-STROKE: Primary | ICD-10-CM

## 2019-04-19 PROCEDURE — 97535 SELF CARE MNGMENT TRAINING: CPT | Performed by: OCCUPATIONAL THERAPIST

## 2019-04-19 PROCEDURE — 97112 NEUROMUSCULAR REEDUCATION: CPT | Performed by: OCCUPATIONAL THERAPIST

## 2019-04-22 ENCOUNTER — OFFICE VISIT (OUTPATIENT)
Dept: OCCUPATIONAL THERAPY | Facility: REHABILITATION | Age: 84
End: 2019-04-22
Payer: MEDICARE

## 2019-04-22 DIAGNOSIS — I69.30 SEQUELAE, POST-STROKE: Primary | ICD-10-CM

## 2019-04-22 PROCEDURE — 97112 NEUROMUSCULAR REEDUCATION: CPT | Performed by: OCCUPATIONAL THERAPIST

## 2019-04-22 PROCEDURE — 97150 GROUP THERAPEUTIC PROCEDURES: CPT | Performed by: OCCUPATIONAL THERAPIST

## 2019-04-23 ENCOUNTER — OFFICE VISIT (OUTPATIENT)
Dept: SPEECH THERAPY | Facility: REHABILITATION | Age: 84
End: 2019-04-23
Payer: MEDICARE

## 2019-04-23 DIAGNOSIS — I69.30 SEQUELAE, POST-STROKE: Primary | ICD-10-CM

## 2019-04-23 DIAGNOSIS — I69.320 APHASIA AS LATE EFFECT OF CEREBROVASCULAR ACCIDENT (CVA): ICD-10-CM

## 2019-04-23 DIAGNOSIS — R48.9 UNSPECIFIED SYMBOLIC DYSFUNCTIONS: ICD-10-CM

## 2019-04-23 PROCEDURE — 92507 TX SP LANG VOICE COMM INDIV: CPT

## 2019-04-24 ENCOUNTER — APPOINTMENT (OUTPATIENT)
Dept: SPEECH THERAPY | Facility: REHABILITATION | Age: 84
End: 2019-04-24
Payer: MEDICARE

## 2019-04-26 ENCOUNTER — OFFICE VISIT (OUTPATIENT)
Dept: OCCUPATIONAL THERAPY | Facility: REHABILITATION | Age: 84
End: 2019-04-26
Payer: MEDICARE

## 2019-04-26 DIAGNOSIS — I69.30 SEQUELAE, POST-STROKE: Primary | ICD-10-CM

## 2019-04-26 PROCEDURE — 97535 SELF CARE MNGMENT TRAINING: CPT | Performed by: OCCUPATIONAL THERAPIST

## 2019-04-26 PROCEDURE — 97112 NEUROMUSCULAR REEDUCATION: CPT | Performed by: OCCUPATIONAL THERAPIST

## 2019-04-29 ENCOUNTER — OFFICE VISIT (OUTPATIENT)
Dept: SPEECH THERAPY | Facility: REHABILITATION | Age: 84
End: 2019-04-29
Payer: MEDICARE

## 2019-04-29 DIAGNOSIS — I69.320 APHASIA AS LATE EFFECT OF CEREBROVASCULAR ACCIDENT (CVA): ICD-10-CM

## 2019-04-29 DIAGNOSIS — I69.30 SEQUELAE, POST-STROKE: ICD-10-CM

## 2019-04-29 DIAGNOSIS — R48.9 UNSPECIFIED SYMBOLIC DYSFUNCTIONS: Primary | ICD-10-CM

## 2019-04-29 PROCEDURE — 92507 TX SP LANG VOICE COMM INDIV: CPT

## 2019-04-30 ENCOUNTER — OFFICE VISIT (OUTPATIENT)
Dept: OCCUPATIONAL THERAPY | Facility: REHABILITATION | Age: 84
End: 2019-04-30
Payer: MEDICARE

## 2019-04-30 DIAGNOSIS — I69.30 SEQUELAE, POST-STROKE: Primary | ICD-10-CM

## 2019-04-30 PROCEDURE — 97535 SELF CARE MNGMENT TRAINING: CPT

## 2019-04-30 PROCEDURE — 97112 NEUROMUSCULAR REEDUCATION: CPT

## 2019-05-02 ENCOUNTER — OFFICE VISIT (OUTPATIENT)
Dept: SPEECH THERAPY | Facility: REHABILITATION | Age: 84
End: 2019-05-02
Payer: MEDICARE

## 2019-05-02 DIAGNOSIS — R48.9 UNSPECIFIED SYMBOLIC DYSFUNCTIONS: Primary | ICD-10-CM

## 2019-05-02 DIAGNOSIS — I69.30 SEQUELAE, POST-STROKE: ICD-10-CM

## 2019-05-02 DIAGNOSIS — I69.320 APHASIA AS LATE EFFECT OF CEREBROVASCULAR ACCIDENT (CVA): ICD-10-CM

## 2019-05-02 PROCEDURE — 92507 TX SP LANG VOICE COMM INDIV: CPT

## 2019-05-03 ENCOUNTER — OFFICE VISIT (OUTPATIENT)
Dept: OCCUPATIONAL THERAPY | Facility: REHABILITATION | Age: 84
End: 2019-05-03
Payer: MEDICARE

## 2019-05-03 DIAGNOSIS — I69.30 SEQUELAE, POST-STROKE: Primary | ICD-10-CM

## 2019-05-03 PROCEDURE — 97150 GROUP THERAPEUTIC PROCEDURES: CPT

## 2019-05-03 PROCEDURE — 97112 NEUROMUSCULAR REEDUCATION: CPT

## 2019-05-07 ENCOUNTER — OFFICE VISIT (OUTPATIENT)
Dept: OCCUPATIONAL THERAPY | Facility: REHABILITATION | Age: 84
End: 2019-05-07
Payer: MEDICARE

## 2019-05-07 ENCOUNTER — OFFICE VISIT (OUTPATIENT)
Dept: SPEECH THERAPY | Facility: REHABILITATION | Age: 84
End: 2019-05-07
Payer: MEDICARE

## 2019-05-07 DIAGNOSIS — I69.30 SEQUELAE, POST-STROKE: ICD-10-CM

## 2019-05-07 DIAGNOSIS — I69.30 SEQUELAE, POST-STROKE: Primary | ICD-10-CM

## 2019-05-07 DIAGNOSIS — R48.9 UNSPECIFIED SYMBOLIC DYSFUNCTIONS: Primary | ICD-10-CM

## 2019-05-07 DIAGNOSIS — I69.320 APHASIA AS LATE EFFECT OF CEREBROVASCULAR ACCIDENT (CVA): ICD-10-CM

## 2019-05-07 PROCEDURE — 92507 TX SP LANG VOICE COMM INDIV: CPT

## 2019-05-07 PROCEDURE — 97112 NEUROMUSCULAR REEDUCATION: CPT

## 2019-05-09 ENCOUNTER — APPOINTMENT (OUTPATIENT)
Dept: OCCUPATIONAL THERAPY | Facility: REHABILITATION | Age: 84
End: 2019-05-09
Payer: MEDICARE

## 2019-05-09 ENCOUNTER — OFFICE VISIT (OUTPATIENT)
Dept: SPEECH THERAPY | Facility: REHABILITATION | Age: 84
End: 2019-05-09
Payer: MEDICARE

## 2019-05-09 DIAGNOSIS — I69.30 SEQUELAE, POST-STROKE: ICD-10-CM

## 2019-05-09 DIAGNOSIS — I69.320 APHASIA AS LATE EFFECT OF CEREBROVASCULAR ACCIDENT (CVA): ICD-10-CM

## 2019-05-09 DIAGNOSIS — R48.9 UNSPECIFIED SYMBOLIC DYSFUNCTIONS: Primary | ICD-10-CM

## 2019-05-09 PROCEDURE — 92507 TX SP LANG VOICE COMM INDIV: CPT

## 2019-05-10 ENCOUNTER — EVALUATION (OUTPATIENT)
Dept: OCCUPATIONAL THERAPY | Facility: REHABILITATION | Age: 84
End: 2019-05-10
Payer: MEDICARE

## 2019-05-10 ENCOUNTER — TRANSCRIBE ORDERS (OUTPATIENT)
Dept: PHYSICAL THERAPY | Facility: REHABILITATION | Age: 84
End: 2019-05-10

## 2019-05-10 DIAGNOSIS — I69.30 SEQUELAE, POST-STROKE: Primary | ICD-10-CM

## 2019-05-10 DIAGNOSIS — I69.30 SEQUELAE OF CEREBRAL INFARCTION: Primary | ICD-10-CM

## 2019-05-10 PROCEDURE — 97112 NEUROMUSCULAR REEDUCATION: CPT | Performed by: OCCUPATIONAL THERAPIST

## 2019-05-10 PROCEDURE — 97535 SELF CARE MNGMENT TRAINING: CPT | Performed by: OCCUPATIONAL THERAPIST

## 2019-05-14 ENCOUNTER — OFFICE VISIT (OUTPATIENT)
Dept: SPEECH THERAPY | Facility: REHABILITATION | Age: 84
End: 2019-05-14
Payer: MEDICARE

## 2019-05-14 ENCOUNTER — OFFICE VISIT (OUTPATIENT)
Dept: OCCUPATIONAL THERAPY | Facility: REHABILITATION | Age: 84
End: 2019-05-14
Payer: MEDICARE

## 2019-05-14 DIAGNOSIS — I69.30 SEQUELAE, POST-STROKE: Primary | ICD-10-CM

## 2019-05-14 DIAGNOSIS — I69.30 SEQUELAE, POST-STROKE: ICD-10-CM

## 2019-05-14 DIAGNOSIS — I69.320 APHASIA AS LATE EFFECT OF CEREBROVASCULAR ACCIDENT (CVA): ICD-10-CM

## 2019-05-14 DIAGNOSIS — R48.9 UNSPECIFIED SYMBOLIC DYSFUNCTIONS: Primary | ICD-10-CM

## 2019-05-14 PROCEDURE — 92507 TX SP LANG VOICE COMM INDIV: CPT

## 2019-05-14 PROCEDURE — 97535 SELF CARE MNGMENT TRAINING: CPT

## 2019-05-16 ENCOUNTER — APPOINTMENT (OUTPATIENT)
Dept: OCCUPATIONAL THERAPY | Facility: REHABILITATION | Age: 84
End: 2019-05-16
Payer: MEDICARE

## 2019-05-16 ENCOUNTER — OFFICE VISIT (OUTPATIENT)
Dept: OCCUPATIONAL THERAPY | Facility: REHABILITATION | Age: 84
End: 2019-05-16
Payer: MEDICARE

## 2019-05-16 ENCOUNTER — APPOINTMENT (OUTPATIENT)
Dept: SPEECH THERAPY | Facility: REHABILITATION | Age: 84
End: 2019-05-16
Payer: MEDICARE

## 2019-05-16 DIAGNOSIS — I69.30 SEQUELAE, POST-STROKE: Primary | ICD-10-CM

## 2019-05-16 PROCEDURE — 97535 SELF CARE MNGMENT TRAINING: CPT

## 2019-05-21 ENCOUNTER — OFFICE VISIT (OUTPATIENT)
Dept: NEUROLOGY | Facility: CLINIC | Age: 84
End: 2019-05-21
Payer: MEDICARE

## 2019-05-21 ENCOUNTER — EVALUATION (OUTPATIENT)
Dept: SPEECH THERAPY | Facility: REHABILITATION | Age: 84
End: 2019-05-21
Payer: MEDICARE

## 2019-05-21 ENCOUNTER — OFFICE VISIT (OUTPATIENT)
Dept: OCCUPATIONAL THERAPY | Facility: REHABILITATION | Age: 84
End: 2019-05-21
Payer: MEDICARE

## 2019-05-21 VITALS
BODY MASS INDEX: 28.05 KG/M2 | RESPIRATION RATE: 14 BRPM | DIASTOLIC BLOOD PRESSURE: 82 MMHG | HEIGHT: 63 IN | WEIGHT: 158.3 LBS | HEART RATE: 78 BPM | SYSTOLIC BLOOD PRESSURE: 152 MMHG

## 2019-05-21 DIAGNOSIS — I69.320 APHASIA AS LATE EFFECT OF CEREBROVASCULAR ACCIDENT (CVA): ICD-10-CM

## 2019-05-21 DIAGNOSIS — I69.30 SEQUELAE, POST-STROKE: Primary | ICD-10-CM

## 2019-05-21 DIAGNOSIS — I69.30 SEQUELAE, POST-STROKE: ICD-10-CM

## 2019-05-21 DIAGNOSIS — G43.009 MIGRAINE WITHOUT AURA AND WITHOUT STATUS MIGRAINOSUS, NOT INTRACTABLE: ICD-10-CM

## 2019-05-21 DIAGNOSIS — R48.9 UNSPECIFIED SYMBOLIC DYSFUNCTIONS: Primary | ICD-10-CM

## 2019-05-21 PROCEDURE — 92507 TX SP LANG VOICE COMM INDIV: CPT

## 2019-05-21 PROCEDURE — 97150 GROUP THERAPEUTIC PROCEDURES: CPT

## 2019-05-21 PROCEDURE — 99213 OFFICE O/P EST LOW 20 MIN: CPT | Performed by: PSYCHIATRY & NEUROLOGY

## 2019-05-21 PROCEDURE — 97112 NEUROMUSCULAR REEDUCATION: CPT

## 2019-05-21 RX ORDER — ASPIRIN 81 MG/1
81 TABLET ORAL DAILY
COMMUNITY
Start: 2019-01-23

## 2019-05-23 ENCOUNTER — OFFICE VISIT (OUTPATIENT)
Dept: OCCUPATIONAL THERAPY | Facility: REHABILITATION | Age: 84
End: 2019-05-23
Payer: MEDICARE

## 2019-05-23 ENCOUNTER — APPOINTMENT (OUTPATIENT)
Dept: SPEECH THERAPY | Facility: REHABILITATION | Age: 84
End: 2019-05-23
Payer: MEDICARE

## 2019-05-23 DIAGNOSIS — I69.30 SEQUELAE, POST-STROKE: Primary | ICD-10-CM

## 2019-05-23 PROCEDURE — 97535 SELF CARE MNGMENT TRAINING: CPT | Performed by: OCCUPATIONAL THERAPIST

## 2019-05-23 PROCEDURE — 97150 GROUP THERAPEUTIC PROCEDURES: CPT | Performed by: OCCUPATIONAL THERAPIST

## 2019-05-28 ENCOUNTER — APPOINTMENT (OUTPATIENT)
Dept: SPEECH THERAPY | Facility: REHABILITATION | Age: 84
End: 2019-05-28
Payer: MEDICARE

## 2019-05-28 ENCOUNTER — OFFICE VISIT (OUTPATIENT)
Dept: OCCUPATIONAL THERAPY | Facility: REHABILITATION | Age: 84
End: 2019-05-28
Payer: MEDICARE

## 2019-05-28 DIAGNOSIS — I69.30 SEQUELAE, POST-STROKE: Primary | ICD-10-CM

## 2019-05-28 PROCEDURE — 97150 GROUP THERAPEUTIC PROCEDURES: CPT

## 2019-05-28 PROCEDURE — 97535 SELF CARE MNGMENT TRAINING: CPT

## 2019-05-30 ENCOUNTER — OFFICE VISIT (OUTPATIENT)
Dept: OCCUPATIONAL THERAPY | Facility: REHABILITATION | Age: 84
End: 2019-05-30
Payer: MEDICARE

## 2019-05-30 ENCOUNTER — APPOINTMENT (OUTPATIENT)
Dept: SPEECH THERAPY | Facility: REHABILITATION | Age: 84
End: 2019-05-30
Payer: MEDICARE

## 2019-05-30 DIAGNOSIS — I69.30 SEQUELAE, POST-STROKE: Primary | ICD-10-CM

## 2019-05-30 PROCEDURE — 97535 SELF CARE MNGMENT TRAINING: CPT

## 2019-06-03 ENCOUNTER — OFFICE VISIT (OUTPATIENT)
Dept: OCCUPATIONAL THERAPY | Facility: REHABILITATION | Age: 84
End: 2019-06-03
Payer: MEDICARE

## 2019-06-03 DIAGNOSIS — I69.30 SEQUELAE, POST-STROKE: Primary | ICD-10-CM

## 2019-06-03 PROCEDURE — 97150 GROUP THERAPEUTIC PROCEDURES: CPT | Performed by: OCCUPATIONAL THERAPIST

## 2019-06-03 PROCEDURE — 97535 SELF CARE MNGMENT TRAINING: CPT | Performed by: OCCUPATIONAL THERAPIST

## 2019-06-06 ENCOUNTER — OFFICE VISIT (OUTPATIENT)
Dept: OCCUPATIONAL THERAPY | Facility: REHABILITATION | Age: 84
End: 2019-06-06
Payer: MEDICARE

## 2019-06-06 DIAGNOSIS — I69.30 SEQUELAE, POST-STROKE: Primary | ICD-10-CM

## 2019-06-06 PROCEDURE — 97535 SELF CARE MNGMENT TRAINING: CPT

## 2019-06-11 ENCOUNTER — OFFICE VISIT (OUTPATIENT)
Dept: OCCUPATIONAL THERAPY | Facility: REHABILITATION | Age: 84
End: 2019-06-11
Payer: MEDICARE

## 2019-06-11 DIAGNOSIS — I69.30 SEQUELAE, POST-STROKE: Primary | ICD-10-CM

## 2019-06-11 PROCEDURE — 97535 SELF CARE MNGMENT TRAINING: CPT

## 2019-06-11 PROCEDURE — 97150 GROUP THERAPEUTIC PROCEDURES: CPT

## 2019-06-13 ENCOUNTER — OFFICE VISIT (OUTPATIENT)
Dept: OCCUPATIONAL THERAPY | Facility: REHABILITATION | Age: 84
End: 2019-06-13
Payer: MEDICARE

## 2019-06-13 DIAGNOSIS — G43.009 MIGRAINE WITHOUT AURA AND WITHOUT STATUS MIGRAINOSUS, NOT INTRACTABLE: Primary | ICD-10-CM

## 2019-06-13 DIAGNOSIS — I69.30 SEQUELAE, POST-STROKE: Primary | ICD-10-CM

## 2019-06-13 PROCEDURE — 97535 SELF CARE MNGMENT TRAINING: CPT

## 2019-06-14 RX ORDER — CARBAMAZEPINE 200 MG/1
TABLET ORAL
Qty: 180 TABLET | Refills: 3 | Status: SHIPPED | OUTPATIENT
Start: 2019-06-14 | End: 2020-02-03 | Stop reason: SDUPTHER

## 2019-06-18 ENCOUNTER — OFFICE VISIT (OUTPATIENT)
Dept: OCCUPATIONAL THERAPY | Facility: REHABILITATION | Age: 84
End: 2019-06-18
Payer: MEDICARE

## 2019-06-18 ENCOUNTER — TELEPHONE (OUTPATIENT)
Dept: NEUROLOGY | Facility: CLINIC | Age: 84
End: 2019-06-18

## 2019-06-18 DIAGNOSIS — I69.30 SEQUELAE, POST-STROKE: Primary | ICD-10-CM

## 2019-06-18 PROCEDURE — 97535 SELF CARE MNGMENT TRAINING: CPT

## 2019-06-20 ENCOUNTER — EVALUATION (OUTPATIENT)
Dept: OCCUPATIONAL THERAPY | Facility: REHABILITATION | Age: 84
End: 2019-06-20
Payer: MEDICARE

## 2019-06-20 ENCOUNTER — TRANSCRIBE ORDERS (OUTPATIENT)
Dept: PHYSICAL THERAPY | Facility: REHABILITATION | Age: 84
End: 2019-06-20

## 2019-06-20 DIAGNOSIS — I69.30 SEQUELAE, POST-STROKE: Primary | ICD-10-CM

## 2019-06-20 DIAGNOSIS — I69.30 SEQUELAE OF CEREBRAL INFARCTION: Primary | ICD-10-CM

## 2019-06-20 PROCEDURE — 97535 SELF CARE MNGMENT TRAINING: CPT | Performed by: OCCUPATIONAL THERAPIST

## 2019-06-25 ENCOUNTER — APPOINTMENT (OUTPATIENT)
Dept: OCCUPATIONAL THERAPY | Facility: REHABILITATION | Age: 84
End: 2019-06-25
Payer: MEDICARE

## 2019-06-27 ENCOUNTER — APPOINTMENT (OUTPATIENT)
Dept: OCCUPATIONAL THERAPY | Facility: REHABILITATION | Age: 84
End: 2019-06-27
Payer: MEDICARE

## 2019-07-10 ENCOUNTER — TELEPHONE (OUTPATIENT)
Dept: NEUROLOGY | Facility: CLINIC | Age: 84
End: 2019-07-10

## 2019-07-10 NOTE — TELEPHONE ENCOUNTER
----- Message from Evette Mills OT sent at 7/10/2019 11:42 AM EDT -----  Regarding: A S  Hi there,    So unfortunately we have not been able to get a hold of Adrianna's son to schedule the [employer]   The [address] [place] [address] facility called her son x2 times with no returned phone call to schedule  I am not sure what else we can do at this time  Any recommendations or suggestions? Thanks in advance!

## 2019-07-10 NOTE — TELEPHONE ENCOUNTER
Per Dr Belita Ganser I left a message on the patients voicemail to return Dr Belita Ganser call  I left my name and a number

## 2019-07-30 ENCOUNTER — TELEPHONE (OUTPATIENT)
Dept: NEUROLOGY | Facility: CLINIC | Age: 84
End: 2019-07-30

## 2019-07-30 NOTE — TELEPHONE ENCOUNTER
MSW received callback from patient's son, Dilcia Churchill, who stated that they have been out of town and also have been having phone issues  Dilcia Churchill stated that patient drives very little - up to 3 miles to the store, and up to 7 miles to [employer]  MSW advised that due to concerns raised by therapy, it has been recommended that patient have a Fitness to [employer] evaluation  MSW explained that this evaluation tests things like: motor speed and control, reaction time, sign recognition, vision - all of which are needed to safely operate a vehicle  MSW advised that it is a computerized test, and at the end it tallies a score which is the percentage that a patient will fail an on the 's test  MSW informed patient's son that this test gives the provider more information to make a decision about patient's ability to drive - if they can still drive or if they need to be reported to [facility] for further assessment  MSW did advise that this test is billable to the insurance under occupational therapy  Patient's son voiced concern that it is a computerized test since patient is 82  MSW relayed concern to Dr Corina Paula who suggested [facility] [place] [facility] as an alternative  MSW attempted to reach patient's son to relay the alternative 's evaluation program through [facility]  MSW left a detailed message about this program - this is a comprehensive driving evaluation that offers a knowledge test and an actual on the road portion  MSW advised that it is not covered by insurance, and the private pay cost is $410  MSW requested callback from patient's son to let this office know how they wish to proceed  Awaiting callback

## 2019-07-30 NOTE — TELEPHONE ENCOUNTER
MSW was informed by Dr María Elena Jones that some concerns cognitive abilities related to driving were identified during patient's OT treatment, thus a Fitness to Drive evaluation was recommended  Dr María Elena Jones stated that [facility] has been trying to reach patient to get Fitness to [employer] appt scheduled, but have been unsuccessful  Dr María Elena Jones stated that he reviewed OT records, but was looking to get more information about the concerns  MSW facilitated call between Dr María Elena Jones and Maximo Bear Dr stated that he would like to bring patient and her son in for an hour long appt to further assess her cognition and develop a plan regarding how to proceed  MSW attempted to reach patient and emergency contact  No answer at both numbers  MSW left message requesting callback  Awaiting same

## 2019-08-14 NOTE — TELEPHONE ENCOUNTER
LATE ENTRY from 8/5/19:    MSW received call from patient's son stated that they would like to proceed with a Fitness to Drive Evaluation at [place] [address] [facility]  MSW did provide patient's son with the phone number to call to schedule same

## 2019-10-04 NOTE — PROGRESS NOTES
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]    Active Problem List:   Patient Active Problem List   Diagnosis    Migraine without aura and without status migrainosus, not intractable    Seizure (HCC)    Sequelae, post-stroke     Past Medical Hx:   Past Medical History:   Diagnosis Date    Dyslipidemia     Generalized seizure ([address] )     generilized motor seizure occuring in his sleep in 1999    GERD (gastroesophageal reflux disease)     Hypertension     Migraine without aura     Recurrent UTI (urinary tract infection)     Vitamin D deficiency     measurable     Past Surgical Hx:   Past Surgical History:   Procedure Laterality Date    CHOLECYSTECTOMY      TOTAL ABDOMINAL HYSTERECTOMY W/ BILATERAL SALPINGOOPHORECTOMY        Pain Levels:   Restin    With Activity:  0    OT low complexity eval: 8381-1690  OT DCAT, Reaction Times Trials, OPTEC Vision Screen, and LACLS: 9584-8685    Subjective/Patient Goal: "Is this really designed for an 82year old lady?"    History of Present Illness:  Pt is a pleasant, active, retired 82 y o  female seen for OT eval s/p referred to [address] [place] [address] s/p d[facility]'d [facility] OT/SLP for cognitive therapy  Pt was d/c'd from OT 2019 and recommended for OT FTD evaluation  Of note, pt initially referred to Occupational Therapy s/p CVA  As per neurology reports, in mid September she presented to [facility] with acute onset right-sided weakness along with speech and language issues   This occurred apparently when she was at a [employer] activity   CTA demonstrated no large vessel occlusive disease   However, Scans demonstrated atherosclerotic change intracranially   MRI of brain was telling in that it demonstrated new multiple small areas of acute infarct involving several different distributions   This was felt to most likely represent a situation resulting from a proximal embolic shower   Subsequent to her hospitalization she underwent STEFANY which demonstrated no PFO but did demonstrate a dilated left atrium   Atrial fibrillation is considered a possible source, although has not as yet been documented  Andremas Finders is following closely with cardiology  Pt with LOS x 6 weeks with PT/OT/Speech services while in hospital environment  Pt was D/Vik to home environment with home therapy  Pt with PMH of single seizure occurring in December of 1999 as well as a history of migraine without aura  Now seen for OT FTD, currently dx'd w/ L MCA CVA, comorbidities as listed above  Lifestyle Performance Model:  Autonomy: Pt was I w/ I/ADLS, drove, & required no use of DME PTA  Reciprocal Relationships: Supportive two sons locally, , lives alone, 7 grandchildren, dtr in law now manages medications and finances PRN  Service to Others: Pt is retired  for [employer], retired in [address] [place] [address]  Intrinsic Gratification: Enjoys going to [employer], choir, reading, walking  Home Setup: Pt lives in [place] alone in a home w/ "few" LAKESHIA  Driving History:  Vehicle Type:   X Car,     Truck,     Motorcycle  Visual History:   X Glasses reading,     Contacts   B/L Cataracts removed,     No Glaucoma,     No [place],     No Hemianopsia   Last Eye Dr  Appointment: annually  Communication Status:   X English,     Persian  Driving History:   No GPS use,     No History of getting lost,    No Tickets,     No DUI  License Status:   X Active,     Inactive  Last Drove:   Yesterday 10/7/2019  Last Accident:   N/A  Initial License Date:   16  Driving Goals:   X Local     No Highway  Car Transfer:   Independent    Objective  Functional/Cognitive Impairments:  1    DCAT: (see attached report for further details) Pt scoring an 86% likelihood on failing on road     Fit,     X Unfit  Recommend On Road Assessment:   Yes,     X No  Recommend to [employer]   Yes,     X No,     Due to: N/A  2   OPTEC vision screen: (see attached)   Contrast sensitivity: impaired   Far acuity: 20/70    Near acuity: 20/30   Color perception: impaired   Lateral phoria: orthophoria @ diopter 9/10   Depth perception far: impaired   Sign recognition: able to ID 9/12 road signs correctly   Color recognition: intact  3  Reaction time trials: see attached  trial 1) 1 576, trial 2) 0 927,  trial 3) 0 997, average of 3 trials: 1 166, Falling within the 25th %ile for reaction time  4   Rapid Pace Walk Test:  10 5 seconds: Those with greater than 9 seconds had a 3 fold increase risk of being in an at fault auto accident  5   Physical findings:  cervical rotation R 75, L 75; UE and LE AROM full with WFL/WNL 4/5 MMT strength, R handed  6  Probability of creating a hazardous situation on specialized on-road test: 66%; see attached report for further details  7   Pt engaged in [place] Cognitive Level Screening (LACLS) and scored the followin 4    Administered [employer] Level Screen (ACLS)  Pt scored 4 4/6 0 indicating it is recommended that the person live with someone who does a daily check on the environment to remove any safety hazards and solve problems when minor changes in the home occur  The person may be alone for part of the day with a pre-established procedure for getting help from a neighbor  Behavior:  Knows day/date  Follows routine sequence of activities  Will learn schedule and follow daily routine  Hazards are not anticipated  Memorization is slow  Will need long term repetitive training for all new tasks  May become upset if routine is altered  May seek assist if lost   Do not expect to be aware of needs of others  May need reminders to keep appointments  Grooming:  Mar[place], brushes and styles hair  Applies makeup  May insist on familiar products    Finds supplies in familiar locations  May be unable to master use of new tools  Hazards are not anticipated  Dressing: Finds garments in familiar locations  Initiates dressing at usual times  Selects familiar combinations of outfits and may wear same outfit over and over  Resists new combinations  May fail to consider weather conditions, season or occasion when selecting clothing  May argue with suggested corrections of errors  Bathing:  Initiates bath/shower at customary times and follows typical routine  May collect supplies from a familiar location  May not vary rate  May want to use same products  May use excessive amounts of product  May have difficulty opening small or unusual containers  May leave towels on floor  Protect from unseen hazards (wet floors, electrical appliances near water)  Walking/exercising:  Ambulates within fitness capacity in neighborhood  Chooses to go by familiar routes  May fail to attend to signs or activities outside visual field  Needs new route identified by others and may learn after several weeks of practice  May become anxious in high stimulus environments (malls, airports, casinos)  Eating: May notice and clean highly visible dropped food items  May not be able to eat and converse at the same time  May resist changes in diet and menu  Watch handling of hot foods/liquids and heavy items  Toileting: Follows a routine of toileting in a familiar environment  Needs to have public rest rooms pointed out  May use too much paper  Does not consider needs of others  May spend excessive time in rest room  Medications: May follow routine rigidly and resist changes in prescriptions based on erroneous beliefs of effects  Does not note adverse side effects  Does not anticipate need to renew prescriptions  May be able to open child proof containers  Can use DAILY pill box marked with day/time (filled by another person)    Use of adaptive equipment:  May be trained to use adaptive equipment that requires a sequence of familiar actions  Does not anticipate hazards  Once learned, may resist changes in equipment  May abandon use of assistive device or use in unsafe manner  Housekeeping:  Sweeps, polishes and puts away objects to match previous performance  Fails to see dirt or dust in corners  May use excessive amounts of , polish or water  May resist changes in routine or products used  May fail to differentiate between similar cleaning products  Food preparation:  May follow a fixed diet and go hungry if usual food items are not available  Does not check inventory and may run out of essentials frequently  Does not consider nutritional needs  Goes to familiar restaurants of grocery stores  Is able to prepare simple hot/cold dishes  Spending money:  Manages routine purchases for immediate needs  May count cash very slowly  May use credit cards or checks  May need assistance for making monthly budget  May not remember to account for taxes or tips  Can manage a daily allowance  Shopping:  Goes to familiar stores for routine items  Does not compare product prices or quality  May not consider cost of item in relation to overall budget  May overspend  Laundry:  Initiates and completes laundry routine at usual time  May sort items by color  May follow schedule rigidly  May forget to clean lint traps  May forget to turn iron off  Traveling:  Needs new routes and travel procedures identified by others  May express interest in driving a car but fails to attend to all environmental cues to do so safely  May not allow adequate time for travel  Telephone:  Writes down messages and new numbers slowly  May attempt to use an address book  May make calls at odd hours without consideration of others schedule or cost of call  May run up large telephone bills  Driving: Should NOT operate a motor vehicle      8  Recommendations:  Based on LACLS results indicating need for recommendation to live w/ someone and receive daily checks, recommend follow up with geriatrics and social work as pt requires daily checks on environment w/ recommendation on pt living w/ someone, lives alone, and would benefit from increased supervised living environment and/or community resources to maximize home safety needs/concerns  Until there is a significant change in medical condition or a change in medication use resulting in an improvement in cognitive function, driving suspension or cessation should be seriously considered  Should there be a significant positive change in medical condition, reassessment at  that time would be recommended  MD to discuss with Pt  Assessment/Plan  Occupational Therapy Skilled Analysis Assessment and Plan of Care:  Pt is a 82 y o  female referred to Occupational Therapy for Fitness to Drive Evaluation to assess pts cognitive, visual, and motor abilities to drive safely in community environment  Pt requires overall mod I for ADLs/self care and mod I for fx'l mobility w/o DME  Pt is currently demonstrating the following occupational deficits: limited 2* difficulty w/ thought organization, insight judgement safety and awareness into deficits, minimizes impairments, mildly defensive when challenged, decreased working memory, auditory processing, temporal awareness, EF skills, STM/immediate delayed recall, divided attention, attention/concenetration, problem solving, and sequencing,  mildly [place]  Pt scoring cognitively at an 86% predicted probability of failing  a specialized on-road test   This indicates  a cognitive competence for driving is outside the range of normal with a higher probability of performing hazardous or extremely dangerous maneuvers   Pt scoring cognitively at a 66% probability of creating a hazardous situation on a specialized road test  Below average scoring was noted in the following areas:Initialization and reactions  Track and process visual events, Ability to encode, retrieve and/or respond to stimuli, Judgement of spatial relations, Ability to quickly respond to complex information, & Impulse Control  Until there is a significant change in medical condition or a change in medication use resulting in an improvement in cognitive function, driving suspension or cessation should be seriously considered  Should there be a significant positive change in medical condition, reassessment at  that time would be recommended  MD to discuss with Pt  Based on the aforementioned OT evaluation, functional performance deficits, and assessments, pt has been identified as a low complexity evaluation  No further skillable OT needs indicated as pt referred for Fitness to Drive Evaluation only per consult script, D/C from OT caseload, D/C OT  MD to discuss results w/ pt  INTERVENTION COMMENTS:  Diagnosis: Sequelae, 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   Please call if you have any questions: [phone]  ROSS Craven MBA, OTR/L, C-GCM, CSRS, CBIS  Director of Outpatient Neuro Occupational Therapy

## 2019-10-08 ENCOUNTER — OFFICE VISIT (OUTPATIENT)
Dept: OCCUPATIONAL THERAPY | Facility: CLINIC | Age: 84
End: 2019-10-08
Payer: MEDICARE

## 2019-10-08 DIAGNOSIS — I69.30 SEQUELAE, POST-STROKE: Primary | ICD-10-CM

## 2019-10-08 PROCEDURE — 96125 COGNITIVE TEST BY HC PRO: CPT

## 2019-10-08 PROCEDURE — 97165 OT EVAL LOW COMPLEX 30 MIN: CPT

## 2019-10-08 NOTE — LETTER
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 Ambulatory referral to Occupational Therapy       Dear Dr Lobo Garcia: Thank you for your recent referral of Melinda Estrella  Please review the attached evaluation summary from Adrianna's recent visit  Please verify that you agree with the plan of care by signing the attached order  If you have any questions or concerns, please do not hesitate to call  I sincerely appreciate the opportunity to share in the care of one of your patients and hope to have another opportunity to work with you in the near future  Sincerely,    Becka Murray OT      Referring Provider:     I certify that I have read the below Plan of Care and certify the need for these services furnished under this plan of treatment while under my care  Hemalatha Villela MD  [facility] Evaluation:  Today's Date: 10/8/2019  Patient Name: Melinda Estrella  : 1935  MRN: [id number]  Referring Provider: Mehrdad Albrecht MD  Dx:  Sequelae, post-stroke [I69 30]    Active Problem List:   Patient Active Problem List   Diagnosis    Migraine without aura and without status migrainosus, not intractable    Seizure (HCC)    Sequelae, post-stroke     Past Medical Hx:   Past Medical History:   Diagnosis Date    Dyslipidemia     Generalized seizure ([address] )     generilized motor seizure occuring in his sleep in 1999    GERD (gastroesophageal reflux disease)     Hypertension     Migraine without aura     Recurrent UTI (urinary tract infection)     Vitamin D deficiency     measurable     Past Surgical Hx:   Past Surgical History:   Procedure Laterality Date    CHOLECYSTECTOMY  TOTAL ABDOMINAL HYSTERECTOMY W/ BILATERAL SALPINGOOPHORECTOMY        Pain Levels:   Restin    With Activity:  0    OT low complexity eval: 0686-2491  OT DCAT, Reaction Times Trials, OPTEC Vision Screen, and LACLS: 6301-5377    Subjective/Patient Goal: "Is this really designed for an 82year old lady?"    History of Present Illness:  Pt is a pleasant, active, retired 82 y o  female seen for OT eval s/p referred to [facility] s/p d/c'd from [email or web address] for OT/SLP for cognitive therapy  Pt was d/c'd from OT 2019 and recommended for OT FTD evaluation  Of note, pt initially referred to Occupational Therapy s/p CVA  As per neurology reports, in mid September she presented to [facility] with acute onset right-sided weakness along with speech and language issues   This occurred apparently when she was at a [employer] activity   CTA demonstrated no large vessel occlusive disease   However, Scans demonstrated atherosclerotic change intracranially   MRI of brain was telling in that it demonstrated new multiple small areas of acute infarct involving several different distributions   This was felt to most likely represent a situation resulting from a proximal embolic shower   Subsequent to her hospitalization she underwent STEFANY which demonstrated no PFO but did demonstrate a dilated left atrium   Atrial fibrillation is considered a possible source, although has not as yet been documented   She is following closely with cardiology  Pt with LOS x 6 weeks with PT/OT/Speech services while in hospital environment  Pt was D/Vik to home environment with home therapy  Pt with PMH of single seizure occurring in 1999 as well as a history of migraine without aura  Now seen for OT FTD, currently dx'd w/ L MCA CVA, comorbidities as listed above  Lifestyle Performance Model:  Autonomy: Pt was I w/ I/ADLS, drove, & required no use of DME PTA    Reciprocal Relationships: Supportive two sons locally, , lives alone, 7 grandchildren, dtr in law now manages medications and finances PRN  Service to Others: Pt is retired  for [employer], retired in [address] [place] [address]  Intrinsic Gratification: Enjoys going to [employer], choir, reading, walking  Home Setup: Pt lives in [place] alone in a home w/ "few" LAKESHIA  Driving History:  Vehicle Type:   X Car,     Truck,     Motorcycle  Visual History:   X Glasses reading,     Contacts   B/L Cataracts removed,     No Glaucoma,     No [place],     No Hemianopsia   Last Eye Dr  Appointment: annually  Communication Status:   X English,     Salvadorean  Driving History:   No GPS use,     No History of getting lost,    No Tickets,     No DUI  License Status:   X Active,     Inactive  Last Drove:   Yesterday 10/7/2019  Last Accident:   N/A  Initial License Date:   16  Driving Goals:   X Local     No Highway  Car Transfer:   Independent    Objective  Functional/Cognitive Impairments:  1  DCAT: (see attached report for further details) Pt scoring an 86% likelihood on failing on road     Fit,     X Unfit  Recommend On Road Assessment:   Yes,     X No  Recommend to [employer]   Yes,     X No,     Due to: N/A  2   OPTEC vision screen: (see attached)   Contrast sensitivity: impaired   Far acuity: 20/70    Near acuity: 20/30   Color perception: impaired   Lateral phoria: orthophoria @ diopter 9/10   Depth perception far: impaired   Sign recognition: able to ID 9/12 road signs correctly   Color recognition: intact  3  Reaction time trials: see attached  trial 1) 1 576, trial 2) 0 927,  trial 3) 0 997, average of 3 trials: 1 166, Falling within the 25th %ile for reaction time  4   Rapid Pace Walk Test:  10 5 seconds: Those with greater than 9 seconds had a 3 fold increase risk of being in an at fault auto accident  5   Physical findings:  cervical rotation R 75, L 75; UE and LE AROM full with WFL/WNL 4/5 MMT strength, R handed  6  Probability of creating a hazardous situation on specialized on-road test: 66%; see attached report for further details  7   Pt engaged in [facility]wayne [employer] Cognitive Level Screening (LACLS) and scored the followin 4    Administered [employer] Level Screen (ACLS)  Pt scored 4 4/6 0 indicating it is recommended that the person live with someone who does a daily check on the environment to remove any safety hazards and solve problems when minor changes in the home occur  The person may be alone for part of the day with a pre-established procedure for getting help from a neighbor  Behavior:  Knows day/date  Follows routine sequence of activities  Will learn schedule and follow daily routine  Hazards are not anticipated  Memorization is slow  Will need long term repetitive training for all new tasks  May become upset if routine is altered  May seek assist if lost   Do not expect to be aware of needs of others  May need reminders to keep appointments  Grooming:  Marin, brushes and styles hair  Applies makeup  May insist on familiar products  Finds supplies in familiar locations  May be unable to master use of new tools  Hazards are not anticipated  Dressing: Finds garments in familiar locations  Initiates dressing at usual times  Selects familiar combinations of outfits and may wear same outfit over and over  Resists new combinations  May fail to consider weather conditions, season or occasion when selecting clothing  May argue with suggested corrections of errors  Bathing:  Initiates bath/shower at customary times and follows typical routine  May collect supplies from a familiar location  May not vary rate  May want to use same products  May use excessive amounts of product  May have difficulty opening small or unusual containers  May leave towels on floor  Protect from unseen hazards (wet floors, electrical appliances near water)    Walking/exercising:  Ambulates within fitness capacity in neighborhood  Chooses to go by familiar routes  May fail to attend to signs or activities outside visual field  Needs new route identified by others and may learn after several weeks of practice  May become anxious in high stimulus environments (malls, airports, casinos)  Eating: May notice and clean highly visible dropped food items  May not be able to eat and converse at the same time  May resist changes in diet and menu  Watch handling of hot foods/liquids and heavy items  Toileting: Follows a routine of toileting in a familiar environment  Needs to have public rest rooms pointed out  May use too much paper  Does not consider needs of others  May spend excessive time in rest room  Medications: May follow routine rigidly and resist changes in prescriptions based on erroneous beliefs of effects  Does not note adverse side effects  Does not anticipate need to renew prescriptions  May be able to open child proof containers  Can use DAILY pill box marked with day/time (filled by another person)  Use of adaptive equipment:  May be trained to use adaptive equipment that requires a sequence of familiar actions  Does not anticipate hazards  Once learned, may resist changes in equipment  May abandon use of assistive device or use in unsafe manner  Housekeeping:  Sweeps, polishes and puts away objects to match previous performance  Fails to see dirt or dust in corners  May use excessive amounts of , polish or water  May resist changes in routine or products used  May fail to differentiate between similar cleaning products  Food preparation:  May follow a fixed diet and go hungry if usual food items are not available  Does not check inventory and may run out of essentials frequently  Does not consider nutritional needs  Goes to familiar restaurants of grocery stores  Is able to prepare simple hot/cold dishes  Spending money:  Manages routine purchases for immediate needs  May count cash very slowly  May use credit cards or checks  May need assistance for making monthly budget  May not remember to account for taxes or tips  Can manage a daily allowance  Shopping:  Goes to familiar stores for routine items  Does not compare product prices or quality  May not consider cost of item in relation to overall budget  May overspend  Laundry:  Initiates and completes laundry routine at usual time  May sort items by color  May follow schedule rigidly  May forget to clean lint traps  May forget to turn iron off  Traveling:  Needs new routes and travel procedures identified by others  May express interest in driving a car but fails to attend to all environmental cues to do so safely  May not allow adequate time for travel  Telephone:  Writes down messages and new numbers slowly  May attempt to use an address book  May make calls at odd hours without consideration of others schedule or cost of call  May run up large telephone bills  Driving: Should NOT operate a motor vehicle  8  Recommendations:  Based on LACLS results indicating need for recommendation to live w/ someone and receive daily checks, recommend follow up with geriatrics and social work as pt requires daily checks on environment w/ recommendation on pt living w/ someone, lives alone, and would benefit from increased supervised living environment and/or community resources to maximize home safety needs/concerns  Until there is a significant change in medical condition or a change in medication use resulting in an improvement in cognitive function, driving suspension or cessation should be seriously considered  Should there be a significant positive change in medical condition, reassessment at  that time would be recommended  MD to discuss with Pt      Assessment/Plan  Occupational Therapy Skilled Analysis Assessment and Plan of Care:  Pt is a 82 y o  female referred to Occupational Therapy for Fitness to [address] [place] [address]  Evaluation to assess pts cognitive, visual, and motor abilities to drive safely in community environment  Pt requires overall mod I for ADLs/self care and mod I for fx'l mobility w/o DME  Pt is currently demonstrating the following occupational deficits: limited 2* difficulty w/ thought organization, insight judgement safety and awareness into deficits, minimizes impairments, mildly defensive when challenged, decreased working memory, auditory processing, temporal awareness, EF skills, STM/immediate delayed recall, divided attention, attention/concenetration, problem solving, and sequencing,  mildly [place]  Pt scoring cognitively at an 86% predicted probability of failing  a specialized on-road test   This indicates  a cognitive competence for driving is outside the range of normal with a higher probability of performing hazardous or extremely dangerous maneuvers  Pt scoring cognitively at a 66% probability of creating a hazardous situation on a specialized road test  Below average scoring was noted in the following areas:Initialization and reactions  Track and process visual events, Ability to encode, retrieve and/or respond to stimuli, Judgement of spatial relations, Ability to quickly respond to complex information, & Impulse Control  Until there is a significant change in medical condition or a change in medication use resulting in an improvement in cognitive function, driving suspension or cessation should be seriously considered  Should there be a significant positive change in medical condition, reassessment at  that time would be recommended  MD to discuss with Pt  Based on the aforementioned OT evaluation, functional performance deficits, and assessments, pt has been identified as a low complexity evaluation  No further skillable OT needs indicated as pt referred for Fitness to Drive Evaluation only per consult script, D/C from OT caseload, D/C OT  MD to discuss results w/ pt      INTERVENTION COMMENTS:  Diagnosis: Sequelae, post-stroke [I69 30]  Precautions: seizures  FOTO: N/A for FTD Evaluations  Insurance: MEDICARE A AND B [[id number]]    Thank you for the consult!   Please call if you have any questions: [phone]  Francisco J Hollis, OTD, JONAH, OTR/L, C-GCM, CSRS, CBIS  Director of Outpatient Neuro Occupational Therapy

## 2019-10-08 NOTE — LETTER
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 Ambulatory referral to Occupational Therapy       Dear Dr Vinod Sauceda: Thank you for your recent referral of Darren Hernandez  Please review the attached evaluation summary from Adrianna's recent visit  Please verify that you agree with the plan of care by signing the attached order  If you have any questions or concerns, please do not hesitate to call  I sincerely appreciate the opportunity to share in the care of one of your patients and hope to have another opportunity to work with you in the near future  Sincerely,    Elin Bains OT      Referring Provider:     I certify that I have read the below Plan of Care and certify the need for these services furnished under this plan of treatment while under my care  Rama London MD  [address] [facility] Evaluation:  Today's Date: 10/8/2019  Patient Name: Darren Hernandez  : 1935  MRN: [id number]  Referring Provider: Momo Luciano MD  Dx:  Sequelae, post-stroke [I69 30]    Active Problem List:   Patient Active Problem List   Diagnosis    Migraine without aura and without status migrainosus, not intractable    Seizure (HCC)    Sequelae, post-stroke     Past Medical Hx:   Past Medical History:   Diagnosis Date    Dyslipidemia     Generalized seizure ([address] )     generilized motor seizure occuring in his sleep in 1999    GERD (gastroesophageal reflux disease)     Hypertension     Migraine without aura     Recurrent UTI (urinary tract infection)     Vitamin D deficiency     measurable     Past Surgical Hx:   Past Surgical History:   Procedure Laterality Date    CHOLECYSTECTOMY  TOTAL ABDOMINAL HYSTERECTOMY W/ BILATERAL SALPINGOOPHORECTOMY        Pain Levels:   Restin    With Activity:  0    OT low complexity eval: 3709-7704  OT DCAT, Reaction Times Trials, OPTEC Vision Screen, and LACLS: 3343-5943    Subjective/Patient Goal: "Is this really designed for an 82year old lady?"    History of Present Illness:  Pt is a pleasant, active, retired 82 y o  female seen for OT eval s/p referred to [address] [facility] s/p d/c'd from [facility] for OT/SLP for cognitive therapy  Pt was d/c'd from OT 2019 and recommended for OT FTD evaluation  Of note, pt initially referred to Occupational Therapy s/p CVA  As per neurology reports, in mid September she presented to [facility] with acute onset right-sided weakness along with speech and language issues   This occurred apparently when she was at a [employer] activity   CTA demonstrated no large vessel occlusive disease   However, Scans demonstrated atherosclerotic change intracranially   MRI of brain was telling in that it demonstrated new multiple small areas of acute infarct involving several different distributions   This was felt to most likely represent a situation resulting from a proximal embolic shower   Subsequent to her hospitalization she underwent STEFANY which demonstrated no PFO but did demonstrate a dilated left atrium   Atrial fibrillation is considered a possible source, although has not as yet been documented   She is following closely with cardiology  Pt with LOS x 6 weeks with PT/OT/Speech services while in hospital environment  Pt was D/Vik to home environment with home therapy  Pt with PMH of single seizure occurring in 1999 as well as a history of migraine without aura  Now seen for OT FTD, currently dx'd w/ L MCA CVA, comorbidities as listed above  Lifestyle Performance Model:  Autonomy: Pt was I w/ I/ADLS, drove, & required no use of DME PTA    Reciprocal Relationships: Supportive two sons locally, , lives alone, 7 grandchildren, dtr in law now manages medications and finances PRN  Service to Others: Pt is retired  for [employer], retired in [address] [place] [address]  Intrinsic Gratification: Enjoys going to [employer], choir, reading, walking  Home Setup: Pt lives in [place] alone in a home w/ "few" LAKESHIA  Driving History:  Vehicle Type:   X Car,     Truck,     Motorcycle  Visual History:   X Glasses reading,     Contacts   B/L Cataracts removed,     No Glaucoma,     No [place],     No Hemianopsia   Last Eye Dr  Appointment: annually  Communication Status:   X English,     Burkinan  Driving History:   No GPS use,     No History of getting lost,    No Tickets,     No DUI  License Status:   X Active,     Inactive  Last Drove:   Yesterday 10/7/2019  Last Accident:   N/A  Initial License Date:   16  Driving Goals:   X Local     No Highway  Car Transfer:   Independent    Objective  Functional/Cognitive Impairments:  1  DCAT: (see attached report for further details) Pt scoring an 86% likelihood on failing on road     Fit,     X Unfit  Recommend On Road Assessment:   Yes,     X No  Recommend to [employer]   Yes,     X No,     Due to: N/A  2   OPTEC vision screen: (see attached)   Contrast sensitivity: impaired   Far acuity: 20/70    Near acuity: 20/30   Color perception: impaired   Lateral phoria: orthophoria @ diopter 9/10   Depth perception far: impaired   Sign recognition: able to ID 9/12 road signs correctly   Color recognition: intact  3  Reaction time trials: see attached  trial 1) 1 576, trial 2) 0 927,  trial 3) 0 997, average of 3 trials: 1 166, Falling within the 25th %ile for reaction time  4   Rapid Pace Walk Test:  10 5 seconds: Those with greater than 9 seconds had a 3 fold increase risk of being in an at fault auto accident  5   Physical findings:  cervical rotation R 75, L 75; UE and LE AROM full with WFL/WNL 4/5 MMT strength, R handed  6  Probability of creating a hazardous situation on specialized on-road test: 66%; see attached report for further details  7   Pt engaged in Dwayne Crowder Cognitive Level Screening (LACLS) and scored the followin 4    Administered [employer] Level Screen (ACLS)  Pt scored 4 4/6 0 indicating it is recommended that the person live with someone who does a daily check on the environment to remove any safety hazards and solve problems when minor changes in the home occur  The person may be alone for part of the day with a pre-established procedure for getting help from a neighbor  Behavior:  Knows day/date  Follows routine sequence of activities  Will learn schedule and follow daily routine  Hazards are not anticipated  Memorization is slow  Will need long term repetitive training for all new tasks  May become upset if routine is altered  May seek assist if lost   Do not expect to be aware of needs of others  May need reminders to keep appointments  Grooming:  Marin, brushes and styles hair  Applies makeup  May insist on familiar products  Finds supplies in familiar locations  May be unable to master use of new tools  Hazards are not anticipated  Dressing: Finds garments in familiar locations  Initiates dressing at usual times  Selects familiar combinations of outfits and may wear same outfit over and over  Resists new combinations  May fail to consider weather conditions, season or occasion when selecting clothing  May argue with suggested corrections of errors  Bathing:  Initiates bath/shower at customary times and follows typical routine  May collect supplies from a familiar location  May not vary rate  May want to use same products  May use excessive amounts of product  May have difficulty opening small or unusual containers  May leave towels on floor  Protect from unseen hazards (wet floors, electrical appliances near water)    Walking/exercising:  Ambulates within fitness capacity in neighborhood  Chooses to go by familiar routes  May fail to attend to signs or activities outside visual field  Needs new route identified by others and may learn after several weeks of practice  May become anxious in high stimulus environments (malls, airports, casinos)  Eating: May notice and clean highly visible dropped food items  May not be able to eat and converse at the same time  May resist changes in diet and menu  Watch handling of hot foods/liquids and heavy items  Toileting: Follows a routine of toileting in a familiar environment  Needs to have public rest rooms pointed out  May use too much paper  Does not consider needs of others  May spend excessive time in rest room  Medications: May follow routine rigidly and resist changes in prescriptions based on erroneous beliefs of effects  Does not note adverse side effects  Does not anticipate need to renew prescriptions  May be able to open child proof containers  Can use DAILY pill box marked with day/time (filled by another person)  Use of adaptive equipment:  May be trained to use adaptive equipment that requires a sequence of familiar actions  Does not anticipate hazards  Once learned, may resist changes in equipment  May abandon use of assistive device or use in unsafe manner  Housekeeping:  Sweeps, polishes and puts away objects to match previous performance  Fails to see dirt or dust in corners  May use excessive amounts of , polish or water  May resist changes in routine or products used  May fail to differentiate between similar cleaning products  Food preparation:  May follow a fixed diet and go hungry if usual food items are not available  Does not check inventory and may run out of essentials frequently  Does not consider nutritional needs  Goes to familiar restaurants of grocery stores  Is able to prepare simple hot/cold dishes  Spending money:  Manages routine purchases for immediate needs  May count cash very slowly  May use credit cards or checks  May need assistance for making monthly budget  May not remember to account for taxes or tips  Can manage a daily allowance  Shopping:  Goes to familiar stores for routine items  Does not compare product prices or quality  May not consider cost of item in relation to overall budget  May overspend  Laundry:  Initiates and completes laundry routine at usual time  May sort items by color  May follow schedule rigidly  May forget to clean lint traps  May forget to turn iron off  Traveling:  Needs new routes and travel procedures identified by others  May express interest in driving a car but fails to attend to all environmental cues to do so safely  May not allow adequate time for travel  Telephone:  Writes down messages and new numbers slowly  May attempt to use an address book  May make calls at odd hours without consideration of others schedule or cost of call  May run up large telephone bills  Driving: Should NOT operate a motor vehicle  8  Recommendations:  Based on LACLS results indicating need for recommendation to live w/ someone and receive daily checks, recommend follow up with geriatrics and social work as pt requires daily checks on environment w/ recommendation on pt living w/ someone, lives alone, and would benefit from increased supervised living environment and/or community resources to maximize home safety needs/concerns  Until there is a significant change in medical condition or a change in medication use resulting in an improvement in cognitive function, driving suspension or cessation should be seriously considered  Should there be a significant positive change in medical condition, reassessment at  that time would be recommended  MD to discuss with Pt      Assessment/Plan  Occupational Therapy Skilled Analysis Assessment and Plan of Care:  Pt is a 82 y o  female referred to Occupational Therapy for Fitness to [address] [place] [address]  Evaluation to assess pts cognitive, visual, and motor abilities to drive safely in community environment  Pt requires overall mod I for ADLs/self care and mod I for fx'l mobility w/o DME  Pt is currently demonstrating the following occupational deficits: limited 2* difficulty w/ thought organization, insight judgement safety and awareness into deficits, minimizes impairments, mildly defensive when challenged, decreased working memory, auditory processing, temporal awareness, EF skills, STM/immediate delayed recall, divided attention, attention/concenetration, problem solving, and sequencing,  mildly [place]  Pt scoring cognitively at an 86% predicted probability of failing  a specialized on-road test   This indicates  a cognitive competence for driving is outside the range of normal with a higher probability of performing hazardous or extremely dangerous maneuvers  Pt scoring cognitively at a 66% probability of creating a hazardous situation on a specialized road test  Below average scoring was noted in the following areas:Initialization and reactions  Track and process visual events, Ability to encode, retrieve and/or respond to stimuli, Judgement of spatial relations, Ability to quickly respond to complex information, & Impulse Control  Until there is a significant change in medical condition or a change in medication use resulting in an improvement in cognitive function, driving suspension or cessation should be seriously considered  Should there be a significant positive change in medical condition, reassessment at  that time would be recommended  MD to discuss with Pt  Based on the aforementioned OT evaluation, functional performance deficits, and assessments, pt has been identified as a low complexity evaluation  No further skillable OT needs indicated as pt referred for Fitness to Drive Evaluation only per consult script, D/C from OT caseload, D/C OT  MD to discuss results w/ pt      INTERVENTION COMMENTS:  Diagnosis: Sequelae, post-stroke [I69 30]  Precautions: seizures  FOTO: N/A for FTD Evaluations  Insurance: MEDICARE A AND B [[id number]]    Thank you for the consult!   Please call if you have any questions: [phone]  ROSS Domingo, JONAH, OTR/L, C-GCM, CSRS, CBIS  Director of Outpatient Neuro Occupational Therapy

## 2019-11-12 ENCOUNTER — TELEPHONE (OUTPATIENT)
Dept: NEUROLOGY | Facility: CLINIC | Age: 84
End: 2019-11-12

## 2020-02-03 ENCOUNTER — OFFICE VISIT (OUTPATIENT)
Dept: NEUROLOGY | Facility: CLINIC | Age: 85
End: 2020-02-03
Payer: MEDICARE

## 2020-02-03 VITALS
HEART RATE: 74 BPM | HEIGHT: 63 IN | WEIGHT: 168.4 LBS | DIASTOLIC BLOOD PRESSURE: 66 MMHG | BODY MASS INDEX: 29.84 KG/M2 | RESPIRATION RATE: 16 BRPM | SYSTOLIC BLOOD PRESSURE: 130 MMHG

## 2020-02-03 DIAGNOSIS — G43.009 MIGRAINE WITHOUT AURA AND WITHOUT STATUS MIGRAINOSUS, NOT INTRACTABLE: Primary | ICD-10-CM

## 2020-02-03 DIAGNOSIS — I69.30 SEQUELAE, POST-STROKE: ICD-10-CM

## 2020-02-03 PROCEDURE — 99214 OFFICE O/P EST MOD 30 MIN: CPT | Performed by: PSYCHIATRY & NEUROLOGY

## 2020-02-03 NOTE — ASSESSMENT & PLAN NOTE
No subsequent symptoms to suggest any recurrent cerebral vascular ischemic event  Events occurred in September 2018 and characterized by multiple small infarcts scattered in distributions involving both cerebral hemispheres and cerebellar hemispheres with a proximal source for emboli and specifically cardiac considered  She continues to work with Cardiology  She remains on Eliquis in combination with an 81 mg aspirin  --to continue Eliquis and 81 mg aspirin as prescribed through Cardiology  --to continue her statin  --to continue working with Cardiology in her PCP with regard to vascular risk factor reduction  --long discussion today with regard to driving  Reviewed in detail with patient and daughter in law, Abimbola Wisdom, the results of the fitness to drive assessment which defined in 86% probability of failing a specialized road test, delineating issues with tracking/processing visual events, slow Nissen responses, impaired spatial relations, and difficulties with impulse control all contributing to the outcome  After the discussion, it was decided the patient would cease driving  Appropriate form will be submitted to [facility]

## 2020-02-03 NOTE — ASSESSMENT & PLAN NOTE
Again, continues to do well with regard to her migraines, as long as she continues on her carbamazepine  In review this was started some 20 years ago in the aftermath of a single generalized motor seizure  Subsequently has had non epileptogenic EEGs but attempts at withdrawal off the carbamazepine have resulted in a significant recurrent issue with her migraine  Patient has as result opted to remain on carbamazepine  --continue carbamazepine 200 mg twice daily  --to complete her recent blood work, CBC and carbamazepine level

## 2020-02-03 NOTE — LETTER
February 3, 2020     [phone] [id number]    Patient: Arlinda Moritz   YOB: 1935   Date of Visit: 2/3/2020       Dear Dr Jose Mayfield: Thank you for referring Damaris Daughters to me for evaluation  Below are my notes for this consultation  If you have questions, please do not hesitate to call me  I look forward to following your patient along with you  Sincerely,        Phuc Monae MD        CC: No Recipients  Phuc Monae MD  2/3/2020 10:44 AM  Sign at close encounter  Patient ID: Arlinda Moritz is a 82 y o  female  Assessment/Plan:    Sequelae, post-stroke  No subsequent symptoms to suggest any recurrent cerebral vascular ischemic event  Events occurred in September 2018 and characterized by multiple small infarcts scattered in distributions involving both cerebral hemispheres and cerebellar hemispheres with a proximal source for emboli and specifically cardiac considered  She continues to work with Cardiology  She remains on Eliquis in combination with an 81 mg aspirin  --to continue Eliquis and 81 mg aspirin as prescribed through Cardiology  --to continue her statin  --to continue working with Cardiology in her PCP with regard to vascular risk factor reduction  --long discussion today with regard to driving  Reviewed in detail with patient and daughter in law, Ramses Lopez, the results of the fitness to drive assessment which defined in 86% probability of failing a specialized road test, delineating issues with tracking/processing visual events, slow Nissen responses, impaired spatial relations, and difficulties with impulse control all contributing to the outcome  After the discussion, it was decided the patient would cease driving  Appropriate form will be submitted to [facility]      Migraine without aura and without status migrainosus, not intractable  Again, continues to do well with regard to her migraines, as long as she continues on her carbamazepine  In review this was started some 20 years ago in the aftermath of a single generalized motor seizure  Subsequently has had non epileptogenic EEGs but attempts at withdrawal off the carbamazepine have resulted in a significant recurrent issue with her migraine  Patient has as result opted to remain on carbamazepine  --continue carbamazepine 200 mg twice daily  --to complete her recent blood work, CBC and carbamazepine level  I spent a total of 30 min with the patient and her daughter in-law with greater than 50% of that time spent counseling , specifically discussing her diagnoses, the results of her formal driving evaluation, and her problems with safety behind the wheel of a car, as detailed above  She will follow up November/December 2020  Subjective:    HPI  Patient, now 82years of age, with a single seizure occurring in December 1999 and history of migraine without aura, returns today to review her status in the aftermath of stroke events which occurred in September of 2018  Accompanied today by her daughter in law, Amado Otero  In review, she had multiple small stroke events involving cerebral and cerebellar hemispheres with the origin felt to be from a proximal embolic source  She has through Cardiology remained on Eliquis along with a daily 81 mg aspirin and has had no symptoms to suggest any further cerebral vascular ischemic events  However, concern raised regarding her safety behind the wheel of a car  This prompted formal evaluation through occupational therapy with a Fitness to Drive formal evaluation  Unfortunately, she did not do well, supporting an 86% probability of failing a specialized road test   Issues were raised with regard to tracking/processing visual events, slow in responses, impaired spatial relations, and also issues with impulse control      She also had head a single seizure occurring in 1999 and a background of migraine without aura   She was on carbamazepine for an interval of time to cover her seizure circumstance  However, after time and with non epileptogenic EEGs attempt/attempts were made to wean her off the carbamazepine but on each occasion she had the re-emergence of significant migraine difficulties  As a result she has wish to maintain carbamazepine for migraine control  She is doing extremely well from that standpoint with no recent migraine episodes to report  Blood work from December reviewed  CMP with creatinine 1 05, AST 20 and ALT 34  No CBC or carbamazepine level were drawn  Past Medical History:   Diagnosis Date    Dyslipidemia     Generalized seizure ([address] )     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    GERD (gastroesophageal reflux disease)     Hypertension     Migraine without aura     Recurrent UTI (urinary tract infection)     Vitamin D deficiency     measurable     Past Surgical History:   Procedure Laterality Date    CHOLECYSTECTOMY      TOTAL ABDOMINAL HYSTERECTOMY W/ BILATERAL SALPINGOOPHORECTOMY       Social History     Socioeconomic History    Marital status: /Civil Union     Spouse name: None    Number of children: None    Years of education: None    Highest education level: None   Occupational History    None   Social Needs    Financial resource strain: None    Food insecurity:     Worry: None     Inability: None    Transportation needs:     Medical: None     Non-medical: None   Tobacco Use    Smoking status: Never Smoker    Smokeless tobacco: Never Used   Substance and Sexual Activity    Alcohol use:  Yes    Drug use: None    Sexual activity: None   Lifestyle    Physical activity:     Days per week: None     Minutes per session: None    Stress: None   Relationships    Social connections:     Talks on phone: None     Gets together: None     Attends Islam service: None     Active member of club or organization: None     Attends meetings of clubs or organizations: None     Relationship status: None    Intimate partner violence:     Fear of current or ex partner: None     Emotionally abused: None     Physically abused: None     Forced sexual activity: None   Other Topics Concern    None   Social History Narrative    None     Family History   Problem Relation Age of Onset    Alzheimer's disease Mother [de-identified]        mid     Allergies   Allergen Reactions    Citalopram      Other reaction(s): Other (See Comments)  insomnia    Dextrose 5%-Electrolyte #48     Levofloxacin      Other reaction(s):  Other (See Comments)  "feels lousey"    Nitrofurantoin Other (See Comments)     diarrhea    Paroxetine Other (See Comments)     nausea    Pseudoephedrine Other (See Comments)     insomnia       Current Outpatient Medications:     amLODIPine (NORVASC) 5 mg tablet, Take 5 mg by mouth, Disp: , Rfl:     apixaban (ELIQUIS) 5 mg, Take 5 mg by mouth 2 (two) times a day , Disp: , Rfl:     aspirin (ECOTRIN LOW STRENGTH) 81 mg EC tablet, Take 81 mg by mouth daily, Disp: , Rfl:     atorvastatin (LIPITOR) 80 mg tablet, Take 80 mg by mouth, Disp: , Rfl:     calcium-vitamin D 250-100 MG-UNIT per tablet, Take 1 tablet by mouth daily , Disp: , Rfl:     carBAMazepine (CARBATROL) 200 mg 12 hr capsule, Take 200 mg by mouth, Disp: , Rfl:     Cholecalciferol 2000 units CAPS, Take 1 capsule by mouth 2 (two) times a day , Disp: , Rfl:     hydrochlorothiazide (MICROZIDE) 12 5 mg capsule, Take 12 5 mg by mouth, Disp: , Rfl:     lisinopril (ZESTRIL) 40 mg tablet, Take 40 mg by mouth, Disp: , Rfl:     multivitamin (THERAGRAN) TABS, Take 1 tablet by mouth, Disp: , Rfl:     omeprazole (PRILOSEC) 20 mg delayed release capsule, Take 20 mg by mouth 2 (two) times a day , Disp: , Rfl:     sertraline (ZOLOFT) 25 mg tablet, Take 25 mg by mouth, Disp: , Rfl:     trimethoprim (PROLOPRIM) 100 mg tablet, Take 100 mg by mouth, Disp: , Rfl:     Objective:    Blood pressure 130/66, pulse 74, resp  rate 16, height 5' 2 5" (1 588 m), weight 76 4 kg (168 lb 6 4 oz)  Physical Exam  Head normocephalic  Eyes nonicteric, with evidence of past cataract surgeries bilaterally  No audible anterior neck bruits  Lungs clear to auscultation  Rhythm regular  GI (abdomen) soft nontender  Bowel sounds present  No significant lower extremity edema  Neurological Exam  Alert  Oriented  No dysarthria  No obvious symbolic language difficulties in general conversation  Mildly apractic appearing spontaneous gait but gait stable  Cranial nerves 2-12 tested and grossly intact except for a modest right lower facial asymmetry, unchanged from prior examinations  Accurate with finger-to-nose bilaterally  Good symmetrical strength throughout the 4 extremities  No upper extremity drift  Muscle stretch reflexes modestly increased on right as compared to left, unchanged from the past   Toe responses difficult to interpret due to prominent withdrawal but did appear as in the past to be upgoing bilaterally  ROS:    Review of Systems   Constitutional: Negative  Negative for appetite change and fever  HENT: Negative  Negative for hearing loss, tinnitus, trouble swallowing and voice change  Eyes: Negative  Negative for photophobia and pain  Respiratory: Negative  Negative for shortness of breath  Cardiovascular: Negative  Negative for palpitations  Gastrointestinal: Positive for constipation  Negative for nausea and vomiting  Endocrine: Negative  Negative for cold intolerance and heat intolerance  Genitourinary: Negative  Negative for dysuria, frequency and urgency  Musculoskeletal: Negative  Negative for myalgias and neck pain  Skin: Negative  Negative for rash  Allergic/Immunologic: Negative  Neurological: Negative  Negative for dizziness, tremors, seizures, syncope, facial asymmetry, speech difficulty, weakness, light-headedness, numbness and headaches     Hematological: Negative  Does not bruise/bleed easily  Psychiatric/Behavioral: Positive for sleep disturbance  Negative for confusion and hallucinations  I personally reviewed the ROS as entered by the medical assistant  *Please note this document was created using voice recognition software and may contain sound-alike word errors  *

## 2020-02-24 ENCOUNTER — TELEPHONE (OUTPATIENT)
Dept: GASTROENTEROLOGY | Facility: CLINIC | Age: 85
End: 2020-02-24

## 2020-02-24 NOTE — TELEPHONE ENCOUNTER
----- Message from Frankie Espino PA-C sent at 2/21/2020  4:24 PM EST -----  Please schedule patient for colonoscopy in 6-8 weeks, to follow-up on diverticulitis, for which patient was just hospitalized and discharged    Thank you

## 2020-03-11 DIAGNOSIS — G43.009 MIGRAINE WITHOUT AURA AND WITHOUT STATUS MIGRAINOSUS, NOT INTRACTABLE: Primary | ICD-10-CM

## 2020-03-11 RX ORDER — CARBAMAZEPINE 200 MG/1
CAPSULE, EXTENDED RELEASE ORAL
Qty: 180 CAPSULE | Refills: 1 | Status: SHIPPED | OUTPATIENT
Start: 2020-03-11 | End: 2020-08-26

## 2020-06-10 ENCOUNTER — TELEPHONE (OUTPATIENT)
Dept: GASTROENTEROLOGY | Facility: AMBULARY SURGERY CENTER | Age: 85
End: 2020-06-10

## 2020-07-03 ENCOUNTER — TELEPHONE (OUTPATIENT)
Dept: GASTROENTEROLOGY | Facility: AMBULARY SURGERY CENTER | Age: 85
End: 2020-07-03

## 2020-07-03 NOTE — TELEPHONE ENCOUNTER
----- Message from Lux Prado sent at 6/10/2020  3:36 PM EDT -----   Do you see the ordering Provider??? (not Dustin Dumont) I don't even see the hospital admission-do you??? Please advise  ----- Message -----  From: Vivi Hawkins PA-C  Sent: 2/21/2020   4:24 PM EDT  To: Lux Prado, Gastroenterology [facility] Clinical    Please schedule patient for colonoscopy in 6-8 weeks, to follow-up on diverticulitis, for which patient was just hospitalized and discharged    Thank you

## 2020-07-03 NOTE — TELEPHONE ENCOUNTER
Offer patient an office appointment with Blake Soriano on 7/23/20 at 1 pm in our [facility]

## 2020-07-06 ENCOUNTER — TELEPHONE (OUTPATIENT)
Dept: GASTROENTEROLOGY | Facility: AMBULARY SURGERY CENTER | Age: 85
End: 2020-07-06

## 2020-07-06 NOTE — TELEPHONE ENCOUNTER
Called to speak with patient to off her 7/23/2020 at 1:00pm at [facility] , patients son answered phone and he said that his mom does not need an appt this has to be for a different patient  He said his mom has not been to [facility] in a long time

## 2020-07-21 ENCOUNTER — TELEPHONE (OUTPATIENT)
Dept: GASTROENTEROLOGY | Facility: AMBULARY SURGERY CENTER | Age: 85
End: 2020-07-21

## 2020-07-21 NOTE — TELEPHONE ENCOUNTER
Called to confirm appt for this Thursday 7/23/2020 and pretty much got yelled by the DIL saying this is the wrong patient she does not need an appt  You guys need to really work this out and I would like to speak with a manager since this keeps on happening  Please call [phone]

## 2020-08-26 DIAGNOSIS — G43.009 MIGRAINE WITHOUT AURA AND WITHOUT STATUS MIGRAINOSUS, NOT INTRACTABLE: ICD-10-CM

## 2020-08-26 RX ORDER — CARBAMAZEPINE 200 MG/1
CAPSULE, EXTENDED RELEASE ORAL
Qty: 180 CAPSULE | Refills: 1 | Status: SHIPPED | OUTPATIENT
Start: 2020-08-26 | End: 2021-03-10 | Stop reason: SDUPTHER

## 2020-10-26 ENCOUNTER — TELEPHONE (OUTPATIENT)
Dept: NEUROLOGY | Facility: CLINIC | Age: 85
End: 2020-10-26

## 2020-11-06 ENCOUNTER — TELEPHONE (OUTPATIENT)
Dept: NEUROLOGY | Facility: CLINIC | Age: 85
End: 2020-11-06

## 2020-11-11 ENCOUNTER — LAB (OUTPATIENT)
Dept: LAB | Facility: MEDICAL CENTER | Age: 85
End: 2020-11-11
Payer: MEDICARE

## 2020-11-11 DIAGNOSIS — G43.009 MIGRAINE WITHOUT AURA AND WITHOUT STATUS MIGRAINOSUS, NOT INTRACTABLE: ICD-10-CM

## 2020-11-11 LAB
BASOPHILS # BLD AUTO: 0.06 THOUSANDS/ΜL (ref 0–0.1)
BASOPHILS NFR BLD AUTO: 1 % (ref 0–1)
CARBAMAZEPINE SERPL-MCNC: 8 UG/ML (ref 4–12)
EOSINOPHIL # BLD AUTO: 0.13 THOUSAND/ΜL (ref 0–0.61)
EOSINOPHIL NFR BLD AUTO: 2 % (ref 0–6)
ERYTHROCYTE [DISTWIDTH] IN BLOOD BY AUTOMATED COUNT: 13.6 % (ref 11.6–15.1)
HCT VFR BLD AUTO: 42 % (ref 34.8–46.1)
HGB BLD-MCNC: 13 G/DL (ref 11.5–15.4)
IMM GRANULOCYTES # BLD AUTO: 0.04 THOUSAND/UL (ref 0–0.2)
IMM GRANULOCYTES NFR BLD AUTO: 1 % (ref 0–2)
LYMPHOCYTES # BLD AUTO: 1.92 THOUSANDS/ΜL (ref 0.6–4.47)
LYMPHOCYTES NFR BLD AUTO: 25 % (ref 14–44)
MCH RBC QN AUTO: 29 PG (ref 26.8–34.3)
MCHC RBC AUTO-ENTMCNC: 31 G/DL (ref 31.4–37.4)
MCV RBC AUTO: 94 FL (ref 82–98)
MONOCYTES # BLD AUTO: 0.88 THOUSAND/ΜL (ref 0.17–1.22)
MONOCYTES NFR BLD AUTO: 12 % (ref 4–12)
NEUTROPHILS # BLD AUTO: 4.57 THOUSANDS/ΜL (ref 1.85–7.62)
NEUTS SEG NFR BLD AUTO: 59 % (ref 43–75)
NRBC BLD AUTO-RTO: 0 /100 WBCS
PLATELET # BLD AUTO: 266 THOUSANDS/UL (ref 149–390)
PMV BLD AUTO: 10.7 FL (ref 8.9–12.7)
RBC # BLD AUTO: 4.49 MILLION/UL (ref 3.81–5.12)
WBC # BLD AUTO: 7.6 THOUSAND/UL (ref 4.31–10.16)

## 2020-11-11 PROCEDURE — 85025 COMPLETE CBC W/AUTO DIFF WBC: CPT

## 2020-11-11 PROCEDURE — 80156 ASSAY CARBAMAZEPINE TOTAL: CPT

## 2020-11-11 PROCEDURE — 36415 COLL VENOUS BLD VENIPUNCTURE: CPT

## 2020-11-13 ENCOUNTER — OFFICE VISIT (OUTPATIENT)
Dept: NEUROLOGY | Facility: CLINIC | Age: 85
End: 2020-11-13
Payer: MEDICARE

## 2020-11-13 VITALS
HEIGHT: 63 IN | WEIGHT: 166.8 LBS | SYSTOLIC BLOOD PRESSURE: 120 MMHG | HEART RATE: 79 BPM | DIASTOLIC BLOOD PRESSURE: 60 MMHG | BODY MASS INDEX: 29.55 KG/M2 | TEMPERATURE: 96.9 F

## 2020-11-13 DIAGNOSIS — I67.2 CEREBRAL ATHEROSCLEROSIS: ICD-10-CM

## 2020-11-13 DIAGNOSIS — I69.30 SEQUELAE, POST-STROKE: Primary | ICD-10-CM

## 2020-11-13 DIAGNOSIS — G43.009 MIGRAINE WITHOUT AURA AND WITHOUT STATUS MIGRAINOSUS, NOT INTRACTABLE: ICD-10-CM

## 2020-11-13 PROCEDURE — 99213 OFFICE O/P EST LOW 20 MIN: CPT | Performed by: PSYCHIATRY & NEUROLOGY

## 2020-11-27 ENCOUNTER — HOSPITAL ENCOUNTER (OUTPATIENT)
Dept: NON INVASIVE DIAGNOSTICS | Facility: HOSPITAL | Age: 85
Discharge: HOME | End: 2020-11-27
Attending: PSYCHIATRY & NEUROLOGY
Payer: MEDICARE

## 2020-11-27 DIAGNOSIS — I67.2 CEREBRAL ATHEROSCLEROSIS: ICD-10-CM

## 2020-11-27 DIAGNOSIS — I69.30 SEQUELAE, POST-STROKE: ICD-10-CM

## 2020-11-27 PROCEDURE — 93880 EXTRACRANIAL BILAT STUDY: CPT

## 2020-11-28 PROCEDURE — 93880 EXTRACRANIAL BILAT STUDY: CPT | Performed by: SURGERY

## 2021-01-06 ENCOUNTER — TELEPHONE (OUTPATIENT)
Dept: NEUROLOGY | Facility: CLINIC | Age: 86
End: 2021-01-06

## 2021-01-06 NOTE — TELEPHONE ENCOUNTER
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

## 2021-03-10 DIAGNOSIS — G43.009 MIGRAINE WITHOUT AURA AND WITHOUT STATUS MIGRAINOSUS, NOT INTRACTABLE: ICD-10-CM

## 2021-03-10 NOTE — TELEPHONE ENCOUNTER
Received fax from [facility] requesting refill of carbamazepine ER  Patient previous Dr Eric Reid patient  Patient following up with [address]       Order pended below, please sign if agreeable

## 2021-03-12 RX ORDER — CARBAMAZEPINE 200 MG/1
CAPSULE, EXTENDED RELEASE ORAL
Qty: 180 CAPSULE | Refills: 1 | Status: SHIPPED | OUTPATIENT
Start: 2021-03-12 | End: 2021-08-11 | Stop reason: SDUPTHER

## 2021-08-11 ENCOUNTER — OFFICE VISIT (OUTPATIENT)
Dept: NEUROLOGY | Facility: CLINIC | Age: 86
End: 2021-08-11
Payer: MEDICARE

## 2021-08-11 VITALS
HEIGHT: 63 IN | BODY MASS INDEX: 30.33 KG/M2 | TEMPERATURE: 97.4 F | RESPIRATION RATE: 20 BRPM | DIASTOLIC BLOOD PRESSURE: 70 MMHG | HEART RATE: 72 BPM | WEIGHT: 171.2 LBS | SYSTOLIC BLOOD PRESSURE: 128 MMHG

## 2021-08-11 DIAGNOSIS — Z86.73 HISTORY OF CVA (CEREBROVASCULAR ACCIDENT): Primary | ICD-10-CM

## 2021-08-11 DIAGNOSIS — Z87.898 HISTORY OF SEIZURE: ICD-10-CM

## 2021-08-11 DIAGNOSIS — G43.009 MIGRAINE WITHOUT AURA AND WITHOUT STATUS MIGRAINOSUS, NOT INTRACTABLE: ICD-10-CM

## 2021-08-11 PROCEDURE — 99214 OFFICE O/P EST MOD 30 MIN: CPT | Performed by: PHYSICIAN ASSISTANT

## 2021-08-11 RX ORDER — METOPROLOL SUCCINATE 50 MG/1
50 TABLET, EXTENDED RELEASE ORAL DAILY
COMMUNITY
Start: 2021-01-06 | End: 2022-01-06

## 2021-08-11 RX ORDER — CARBAMAZEPINE 200 MG/1
CAPSULE, EXTENDED RELEASE ORAL
Qty: 180 CAPSULE | Refills: 1 | Status: SHIPPED | OUTPATIENT
Start: 2021-08-11 | End: 2022-03-08

## 2021-08-11 NOTE — ASSESSMENT & PLAN NOTE
History of single seizure in 1999, no abnormal EEGs and no recurrent events  She was initially on carbamazepine due to the seizure, however this was weaned off at 1 point  Her migraines returned, therefore carbamazepine was added back for migraine control  No recurrent events concerning for seizure

## 2021-08-11 NOTE — PATIENT INSTRUCTIONS
For ongoing stroke prevention, continue aspirin 81mg daily, Eliquis 5mg twice a day, and atorvastatin 80mg daily  Continue to follow with PCP and cardiology for management of blood pressure, cholesterol, blood sugar  Continue carbamazepine 200mg twice a day  Follow up in 1 year or sooner if needed    If you experience any facial droop, weakness on one side of the body, speech or swallowing difficulty, painless loss of vision in one eye, double vision, vertigo that does not resolve quickly/imbalance, go to the ER/call 911  In addition, if you were to have a fall and strike your head, you should be seen in the ER urgently for evaluation due to your use of anticoagulation

## 2021-08-11 NOTE — PROGRESS NOTES
Patient ID: Roula Reyes is a 82 y o  female  Assessment/Plan:    History of CVA (cerebrovascular accident)  Multifocal embolic appearing CVA in Sept 2018, placed on Eliquis 5mg BID at the time  She follows with cardiology  She is also on ASA 81mg daily due to aortic atheroma and intracranial stenosis  She remains on atorvastatin 80mg daily  She denies any new symptoms to indicate recurrent TIA/CVA  No issues with bleeding/bruising  Plan:  - For ongoing stroke prevention patient will continue Eliquis 5 mg b i d , aspirin 81 mg daily, atorvastatin 80 mg daily, along with appropriate blood pressure and glycemic control   -Will defer management of blood pressure, lipids and blood sugar to her PCP   -She will continue to follow with Cardiology as scheduled  -Heart healthy diet and routine exercise as tolerated   -Fall precautions discussed  Patient informed that if she were to have a fall and strike her head, she needs to be seen in the ED urgently for evaluation given her use of anticoagulation  -Signs and symptoms of stroke reviewed and included in AVS today    History of seizure  History of single seizure in 1999, no abnormal EEGs and no recurrent events  She was initially on carbamazepine due to the seizure, however this was weaned off at 1 point  Her migraines returned, therefore carbamazepine was added back for migraine control  No recurrent events concerning for seizure  Migraine without aura and without status migrainosus, not intractable  Migraines well controlled with carbamazepine 200 mg b i d   Cannot remember when she last had a migraine  In the past, attempts to wean off carbamazepine have caused worsening migraines  She can continue the med  Na has remained normal   If she gets a headache, should avoid NSAIDs and take Tylenol instead    Patient will follow-up in 1 year or sooner if needed  She was advised to call the office for any new or worsening symptoms in the meantime  Diagnoses and all orders for this visit:    History of CVA (cerebrovascular accident)    Migraine without aura and without status migrainosus, not intractable  -     carBAMazepine (CARBATROL) 200 mg 12 hr capsule; TAKE ONE CAPSULE BY MOUTH TWICE DAILY    History of seizure    Other orders             Subjective:    ELIZABETH Schrader is a 82 y o  female who presents today for neurologic follow up  Patient is accompanied by her daughter in law  [facility] today  Patient was previously followed by Dr Briant Riedel for many years until he retired this past year  To review, patient with history of HTN, HLD, single seizure in 1999, migraines, and more recently, CVA in 2018  Patient with multiple small stroke events involving cerebral and cerebellar hemispheres September 2018, felt secondary to a proximal embolic origin  She has been followed through Cardiology and has remained on Eliquis along with an 81 mg aspirin daily (this was added due to aortic atheroma)  She remains on atorvastatin  With regard her migraine (without aura) she continues to do very well on her carbamazepine  Her carbamazepine was initially introduced following a single seizure which occurred in December of 1999  She was eventually weaned off the carbamazepine but on attempt to do so she had a significant return of her migraines  As result, she requested to stay on the carbamazepine as a migraine preventative agent  Patient lives on her own but with oversight from family  Patient no longer driving nor does she have an active 's license  Today, she reports she is doing well and offers no complaints  She cannot remember the last time she had a migraine  She is tolerating ASA and Eliquis with no bleeding issues or excessive bruising  Had 1 fall recently, no head strike  She does not check BP at home, but it is always good when she is at a doctor's office  Her daughter in law manages her meds and pays her bills    No issues with ADLs   Last labs completed 3/30/21  Carbamazepine level total:  7 1 (4 0-12 0)  CMP:  Na 139, BUN 27, Cr 1 01, GFR 52, normal LFTs  A1C 6 1  Last lipid panel 8/11/21 , LDL 73  The following portions of the patient's history were reviewed and updated as appropriate: current medications, past family history, past medical history, past social history, past surgical history and problem list          Objective:    Blood pressure 128/70, pulse 72, temperature (!) 97 4 °F (36 3 °C), temperature source Temporal, resp  rate 20, height 5' 2 5" (1 588 m), weight 77 7 kg (171 lb 3 2 oz)  Physical Exam  Constitutional:       Appearance: Normal appearance  HENT:      Head: Normocephalic and atraumatic  Eyes:      Extraocular Movements: EOM normal       Pupils: Pupils are equal, round, and reactive to light  Skin:     General: Skin is warm and dry  Neurological:      Mental Status: She is alert  Coordination: Coordination is intact  Deep Tendon Reflexes: Strength normal and reflexes are normal and symmetric  Psychiatric:         Mood and Affect: Mood normal          Speech: Speech normal          Behavior: Behavior normal          Neurological Exam  Mental Status  Alert  Oriented to person, place, time and situation  Speech is normal  Language is fluent with no aphasia  Attention and concentration are normal     Cranial Nerves  CN II: Visual fields full to confrontation  CN III, IV, VI: Extraocular movements intact bilaterally  Pupils equal round and reactive to light bilaterally  CN V: Facial sensation is normal   CN VII: Full and symmetric facial movement  CN VIII: Hearing is normal   CN IX, X: Palate elevates symmetrically  CN XI: Shoulder shrug strength is normal   CN XII: Tongue midline without atrophy or fasciculations  Motor   Normal muscle tone  Strength is 5/5 throughout all four extremities  Sensory  Light touch is normal in upper and lower extremities       Reflexes  Deep tendon reflexes are 2+ and symmetric in all four extremities with downgoing toes bilaterally  Coordination  Finger-to-nose, rapid alternating movements and heel-to-shin normal bilaterally without dysmetria  Gait  Casual gait is normal including stance, stride, and arm swing  No assistive device   ROS:    Review of Systems   Constitutional: Negative  Negative for appetite change and fever  HENT: Negative  Negative for hearing loss, tinnitus, trouble swallowing and voice change  Eyes: Negative  Negative for photophobia and pain  Respiratory: Negative  Negative for shortness of breath  Cardiovascular: Negative  Negative for palpitations  Gastrointestinal: Negative  Negative for nausea and vomiting  Endocrine: Negative  Negative for cold intolerance  Genitourinary: Negative  Negative for dysuria, frequency and urgency  Musculoskeletal: Negative  Negative for myalgias and neck pain  Skin: Negative  Negative for rash  Neurological: Negative  Negative for dizziness, tremors, seizures, syncope, facial asymmetry, speech difficulty, weakness, light-headedness, numbness and headaches  Hematological: Negative  Does not bruise/bleed easily  Psychiatric/Behavioral: Positive for sleep disturbance (due to anxiety)  Negative for confusion and hallucinations  The patient is nervous/anxious        I personally reviewed and updated the ROS as appropriate

## 2021-08-11 NOTE — ASSESSMENT & PLAN NOTE
Migraines well controlled with carbamazepine 200 mg b i d   Cannot remember when she last had a migraine  In the past, attempts to wean off carbamazepine have caused worsening migraines  She can continue the med    Na has remained normal   If she gets a headache, should avoid NSAIDs and take Tylenol instead

## 2021-08-11 NOTE — ASSESSMENT & PLAN NOTE
Multifocal embolic appearing CVA in Sept 2018, placed on Eliquis 5mg BID at the time  She follows with cardiology  She is also on ASA 81mg daily due to aortic atheroma and intracranial stenosis  She remains on atorvastatin 80mg daily  She denies any new symptoms to indicate recurrent TIA/CVA  No issues with bleeding/bruising  Plan:  - For ongoing stroke prevention patient will continue Eliquis 5 mg b i d , aspirin 81 mg daily, atorvastatin 80 mg daily, along with appropriate blood pressure and glycemic control   -Will defer management of blood pressure, lipids and blood sugar to her PCP   -She will continue to follow with Cardiology as scheduled  -Heart healthy diet and routine exercise as tolerated   -Fall precautions discussed    Patient informed that if she were to have a fall and strike her head, she needs to be seen in the ED urgently for evaluation given her use of anticoagulation  -Signs and symptoms of stroke reviewed and included in AVS today

## 2022-03-07 DIAGNOSIS — G43.009 MIGRAINE WITHOUT AURA AND WITHOUT STATUS MIGRAINOSUS, NOT INTRACTABLE: ICD-10-CM

## 2022-03-08 RX ORDER — CARBAMAZEPINE 200 MG/1
CAPSULE, EXTENDED RELEASE ORAL
Qty: 180 CAPSULE | Refills: 0 | Status: SHIPPED | OUTPATIENT
Start: 2022-03-08 | End: 2022-06-20

## 2022-06-19 DIAGNOSIS — G43.009 MIGRAINE WITHOUT AURA AND WITHOUT STATUS MIGRAINOSUS, NOT INTRACTABLE: ICD-10-CM

## 2022-06-20 RX ORDER — CARBAMAZEPINE 200 MG/1
CAPSULE, EXTENDED RELEASE ORAL
Qty: 180 CAPSULE | Refills: 0 | Status: SHIPPED | OUTPATIENT
Start: 2022-06-20

## 2022-08-05 ENCOUNTER — TELEPHONE (OUTPATIENT)
Dept: NEUROLOGY | Facility: CLINIC | Age: 87
End: 2022-08-05

## 2022-08-05 NOTE — TELEPHONE ENCOUNTER
LMOM to remind pt of upcoming appt on 08/17/22  Left the office number for any questions or concerns

## 2022-08-31 DIAGNOSIS — G43.009 MIGRAINE WITHOUT AURA AND WITHOUT STATUS MIGRAINOSUS, NOT INTRACTABLE: ICD-10-CM

## 2022-08-31 RX ORDER — CARBAMAZEPINE 200 MG/1
CAPSULE, EXTENDED RELEASE ORAL
Qty: 180 CAPSULE | Refills: 0 | Status: SHIPPED | OUTPATIENT
Start: 2022-08-31

## 2022-08-31 NOTE — TELEPHONE ENCOUNTER
Patient cxl 8/17 appt, has f/u on 11/9/22 now  Last seen 8/11/21  Will provide refills until November appt  Needs to be seen in November

## 2022-10-21 ENCOUNTER — TELEPHONE (OUTPATIENT)
Dept: NEUROLOGY | Facility: CLINIC | Age: 87
End: 2022-10-21

## 2022-10-21 NOTE — TELEPHONE ENCOUNTER
LMOM to remind pt of upcoming appt on 11/09/22  left the office number for any questions or concerns

## 2022-11-09 ENCOUNTER — OFFICE VISIT (OUTPATIENT)
Dept: NEUROLOGY | Facility: CLINIC | Age: 87
End: 2022-11-09

## 2022-11-09 VITALS
TEMPERATURE: 97 F | BODY MASS INDEX: 28.07 KG/M2 | WEIGHT: 158.4 LBS | RESPIRATION RATE: 18 BRPM | HEART RATE: 63 BPM | HEIGHT: 63 IN | DIASTOLIC BLOOD PRESSURE: 70 MMHG | SYSTOLIC BLOOD PRESSURE: 152 MMHG

## 2022-11-09 DIAGNOSIS — G43.009 MIGRAINE WITHOUT AURA AND WITHOUT STATUS MIGRAINOSUS, NOT INTRACTABLE: ICD-10-CM

## 2022-11-09 DIAGNOSIS — Z87.898 HISTORY OF SEIZURE: ICD-10-CM

## 2022-11-09 DIAGNOSIS — Z86.73 HISTORY OF CVA (CEREBROVASCULAR ACCIDENT): Primary | ICD-10-CM

## 2022-11-09 RX ORDER — METFORMIN HYDROCHLORIDE 500 MG/1
TABLET, EXTENDED RELEASE ORAL
COMMUNITY
Start: 2022-10-04

## 2022-11-09 NOTE — PATIENT INSTRUCTIONS
For ongoing stroke prevention, continue aspirin 81mg daily, Eliquis 5mg twice a day, and atorvastatin 80mg daily  Continue to follow with PCP and cardiology for management of blood pressure, cholesterol, blood sugar  Continue carbamazepine 200mg twice a day  Can ask the PCP if she would take over the medication instead of having us prescribe   Heart healthy diet and routine exercise as tolerated  Follow up in 1 year or sooner if needed     If you experience any facial droop, weakness on one side of the body, speech or swallowing difficulty, painless loss of vision in one eye, double vision, vertigo that does not resolve quickly/imbalance, go to the ER/call 911  In addition, if you were to have a fall and strike your head, you should be seen in the ER urgently for evaluation due to your use of anticoagulation

## 2022-11-09 NOTE — PROGRESS NOTES
Patient ID: Yasir English is a 82 y o  female  Assessment/Plan:    History of CVA (cerebrovascular accident)  Multifocal embolic appearing CVA in Sept 2018, placed on Eliquis 5mg BID at the time  She follows with cardiology  She is also on ASA 81mg daily due to aortic atheroma and intracranial stenosis  She remains on atorvastatin 80mg daily      She denies any new symptoms to indicate recurrent TIA/CVA  No issues with bleeding/bruising      Plan:  - For ongoing stroke prevention patient will continue Eliquis 5 mg b i d , aspirin 81 mg daily, atorvastatin 80 mg daily, along with appropriate blood pressure and glycemic control   -Will defer management of blood pressure, lipids and blood sugar to her PCP   -She will continue to follow with Cardiology as scheduled  -Heart healthy diet and routine exercise as tolerated   -Fall precautions discussed  Patient informed that if she were to have a fall and strike her head, she needs to be seen in the ED urgently for evaluation given her use of anticoagulation  -Signs and symptoms of stroke reviewed and included in AVS today    History of seizure  History of single seizure in 1999, no abnormal EEGs and no recurrent events  She was initially on carbamazepine due to the seizure, however this was weaned off at 1 point  Her migraines returned, therefore carbamazepine was added back for migraine control  No recurrent events concerning for seizure  Migraine without aura and without status migrainosus, not intractable  Migraines well controlled with carbamazepine 200 mg b i d  Cannot remember when she last had a migraine  In the past, attempts to wean off carbamazepine have caused worsening migraines  She can continue the med  Na has remained normal  If she gets a headache, should avoid NSAIDs and take Tylenol instead  Patient did question why she needed to continue to follow up with neurology    Discussed we currently Rx her carbamazepine, therefore should be seen at least yearly  However she has been extremely stable for a number of years, therefore I suggested she could discuss with her PCP and see if PCP would take over carbamazepine Rx and at that point we can see her on a PRN basis  She will discuss with PCP  For now, will make a 1 year follow up, which can be cancelled if PCP agrees to take over carbamazepine Rx  Diagnoses and all orders for this visit:    History of CVA (cerebrovascular accident)    History of seizure    Migraine without aura and without status migrainosus, not intractable    Other orders  -     metFORMIN (GLUCOPHAGE-XR) 500 mg 24 hr tablet           Subjective:    HPI    Jhonathan Guevara is a 82 y o  female who presents today for neurologic follow up  Patient is accompanied by her daughter in law Marietta today  She was last seen over 1 year ago in August 2021  Patient was previously followed by Dr Jason Bowling for many years until he retired  To review, patient with history of HTN, HLD, single seizure in 1999, migraines, and more recently, CVA in 2018  Patient with multiple small stroke events involving cerebral and cerebellar hemispheres September 2018, felt secondary to a proximal embolic origin  She has been followed by Cardiology and has remained on Eliquis along with an 81 mg aspirin daily (this was added due to aortic atheroma)  She remains on atorvastatin  With regard her migraine (without aura) she continues to do very well on her carbamazepine  Her carbamazepine was initially introduced following a single seizure which occurred in December of 1999  She was eventually weaned off the carbamazepine but on attempt to do so she had a significant return of her migraines  As result, she requested to stay on the carbamazepine as a migraine preventative agent  Patient lives on her own but with oversight from family  Patient no longer driving nor does she have an active 's license       Today, she reports she is doing well and offers no complaints  She cannot remember the last time she had a migraine  No seizures reported  She continues on carbamazepine 200mg BID  She is tolerating ASA and Eliquis with no bleeding issues or excessive bruising  Her daughter in law manages her meds and pays her bills  No issues with ADLs, continues to live on her own  She did have 1 fall in Dec 2021, had to be seen in the ED as she injured her face  Otherwise, no falls and uses her rollator  The following portions of the patient's history were reviewed and updated as appropriate: current medications, past family history, past medical history, past social history, past surgical history and problem list          Objective:    Blood pressure 152/70, pulse 63, temperature (!) 97 °F (36 1 °C), temperature source Tympanic, resp  rate 18, height 5' 2 5" (1 588 m), weight 71 8 kg (158 lb 6 4 oz)  Physical Exam  Constitutional:       Appearance: Normal appearance  HENT:      Head: Normocephalic and atraumatic  Eyes:      Extraocular Movements: EOM normal       Pupils: Pupils are equal, round, and reactive to light  Neurological:      Mental Status: She is alert  Deep Tendon Reflexes: Strength normal       Reflex Scores:       Bicep reflexes are 2+ on the right side and 2+ on the left side  Brachioradialis reflexes are 2+ on the right side and 2+ on the left side  Patellar reflexes are 1+ on the right side and 1+ on the left side  Achilles reflexes are 1+ on the right side and 1+ on the left side  Psychiatric:         Mood and Affect: Mood normal          Speech: Speech normal          Behavior: Behavior normal          Neurological Exam  Mental Status  Alert  Orientation: Patient had some trouble with orientation questions  She could initially not tell me the month, then I asked the season, for which she said "winter"  I then asked what month it would be if we were in winter    She listed Dec, Dinesh, Feb and then said "no, its November Thanksgiving is coming up"  Initially stated correct year of '22 then said it was '23  Unable to state the day of the week (said it was mon or tues, it is weds)  Nabil Ribera Speech is normal  Language is fluent with no aphasia  Attention and concentration are normal   Named pen and watch  Able to follow commands   Cranial Nerves  CN II: Visual fields full to confrontation  CN III, IV, VI: Extraocular movements intact bilaterally  Pupils equal round and reactive to light bilaterally  CN V: Facial sensation is normal   CN VII: Full and symmetric facial movement  CN VIII: Hearing is normal   CN IX, X: Palate elevates symmetrically  CN XI: Shoulder shrug strength is normal   CN XII: Tongue midline without atrophy or fasciculations  Motor   Normal muscle tone  Strength is 5/5 throughout all four extremities  Sensory  Light touch is normal in upper and lower extremities  Reflexes                                            Right                      Left  Brachioradialis                    2+                         2+  Biceps                                 2+                         2+  Patellar                                1+                         1+  Achilles                                1+                         1+    Coordination  Right: Finger-to-nose normal Left: Finger-to-nose normal     Gait    Steady casual gait with rolling walker  ROS:    Review of Systems   Constitutional: Negative for chills and fever  HENT: Negative for ear pain and sore throat  Eyes: Negative for pain and visual disturbance  Respiratory: Negative for cough and shortness of breath  Cardiovascular: Negative for chest pain and palpitations  Gastrointestinal: Negative for abdominal pain and vomiting  Genitourinary: Negative for dysuria and hematuria  Musculoskeletal: Negative for arthralgias and back pain  Skin: Negative for color change and rash     Neurological: Negative for seizures and syncope  All other systems reviewed and are negative      I personally reviewed and updated the ROS as appropriate

## 2022-11-26 DIAGNOSIS — G43.009 MIGRAINE WITHOUT AURA AND WITHOUT STATUS MIGRAINOSUS, NOT INTRACTABLE: ICD-10-CM

## 2022-11-27 RX ORDER — CARBAMAZEPINE 200 MG/1
CAPSULE, EXTENDED RELEASE ORAL
Qty: 180 CAPSULE | Refills: 0 | Status: SHIPPED | OUTPATIENT
Start: 2022-11-27

## 2024-07-09 ENCOUNTER — HOSPITAL ENCOUNTER (EMERGENCY)
Facility: HOSPITAL | Age: 89
Discharge: HOME | End: 2024-07-10
Attending: EMERGENCY MEDICINE
Payer: MEDICARE

## 2024-07-09 DIAGNOSIS — D64.9 ANEMIA: ICD-10-CM

## 2024-07-09 DIAGNOSIS — N39.0 UTI (URINARY TRACT INFECTION): Primary | ICD-10-CM

## 2024-07-09 DIAGNOSIS — R45.1 AGITATION: ICD-10-CM

## 2024-07-09 PROCEDURE — 85025 COMPLETE CBC W/AUTO DIFF WBC: CPT | Performed by: EMERGENCY MEDICINE

## 2024-07-09 PROCEDURE — 99285 EMERGENCY DEPT VISIT HI MDM: CPT | Performed by: EMERGENCY MEDICINE

## 2024-07-09 PROCEDURE — 80053 COMPREHEN METABOLIC PANEL: CPT | Performed by: EMERGENCY MEDICINE

## 2024-07-09 PROCEDURE — 83880 ASSAY OF NATRIURETIC PEPTIDE: CPT | Performed by: EMERGENCY MEDICINE

## 2024-07-09 PROCEDURE — 84484 ASSAY OF TROPONIN QUANT: CPT | Performed by: EMERGENCY MEDICINE

## 2024-07-09 PROCEDURE — 36415 COLL VENOUS BLD VENIPUNCTURE: CPT | Performed by: EMERGENCY MEDICINE

## 2024-07-09 PROCEDURE — 99283 EMERGENCY DEPT VISIT LOW MDM: CPT

## 2024-07-09 RX ORDER — LORAZEPAM 0.5 MG/1
0.5 TABLET ORAL
COMMUNITY
Start: 2024-04-12

## 2024-07-09 RX ORDER — BISACODYL 10 MG
10 SUPPOSITORY, RECTAL RECTAL DAILY PRN
COMMUNITY

## 2024-07-09 RX ORDER — CEPHALEXIN 500 MG/1
500 CAPSULE ORAL 4 TIMES DAILY
COMMUNITY
Start: 2024-07-03 | End: 2024-07-10

## 2024-07-10 VITALS
DIASTOLIC BLOOD PRESSURE: 70 MMHG | TEMPERATURE: 98.7 F | HEART RATE: 122 BPM | OXYGEN SATURATION: 93 % | RESPIRATION RATE: 20 BRPM | SYSTOLIC BLOOD PRESSURE: 139 MMHG

## 2024-07-10 LAB
ALBUMIN SERPL BCG-MCNC: 3.7 G/DL (ref 3.5–5)
ALP SERPL-CCNC: 74 U/L (ref 34–104)
ALT SERPL W P-5'-P-CCNC: 20 U/L (ref 7–52)
ANION GAP SERPL CALCULATED.3IONS-SCNC: 8 MMOL/L (ref 4–13)
AST SERPL W P-5'-P-CCNC: 18 U/L (ref 13–39)
ATRIAL RATE: 136 BPM
BACTERIA UR QL AUTO: ABNORMAL /HPF
BASOPHILS # BLD AUTO: 0.06 THOUSANDS/ÂΜL (ref 0–0.1)
BASOPHILS NFR BLD AUTO: 1 % (ref 0–1)
BILIRUB SERPL-MCNC: 0.27 MG/DL (ref 0.2–1)
BILIRUB UR QL STRIP: NEGATIVE
BILIRUB UR QL STRIP: NEGATIVE
BNP SERPL-MCNC: 673 PG/ML (ref 0–100)
BUN SERPL-MCNC: 25 MG/DL (ref 5–25)
CALCIUM SERPL-MCNC: 8.3 MG/DL (ref 8.4–10.2)
CARDIAC TROPONIN I PNL SERPL HS: 10 NG/L
CHLORIDE SERPL-SCNC: 104 MMOL/L (ref 96–108)
CLARITY UR: ABNORMAL
CLARITY UR: CLEAR
CO2 SERPL-SCNC: 25 MMOL/L (ref 21–32)
COLOR UR: YELLOW
COLOR UR: YELLOW
CREAT SERPL-MCNC: 0.95 MG/DL (ref 0.6–1.3)
EOSINOPHIL # BLD AUTO: 0.23 THOUSAND/ÂΜL (ref 0–0.61)
EOSINOPHIL NFR BLD AUTO: 3 % (ref 0–6)
ERYTHROCYTE [DISTWIDTH] IN BLOOD BY AUTOMATED COUNT: 18.2 % (ref 11.6–15.1)
GFR SERPL CREATININE-BSD FRML MDRD: 54 ML/MIN/1.73SQ M
GLUCOSE SERPL-MCNC: 115 MG/DL (ref 65–140)
GLUCOSE UR STRIP-MCNC: NEGATIVE MG/DL
GLUCOSE UR STRIP-MCNC: NEGATIVE MG/DL
HCT VFR BLD AUTO: 29 % (ref 34.8–46.1)
HGB BLD-MCNC: 8.4 G/DL (ref 11.5–15.4)
HGB UR QL STRIP.AUTO: ABNORMAL
HGB UR QL STRIP.AUTO: ABNORMAL
HYALINE CASTS #/AREA URNS LPF: ABNORMAL /LPF
IMM GRANULOCYTES # BLD AUTO: 0.02 THOUSAND/UL (ref 0–0.2)
IMM GRANULOCYTES NFR BLD AUTO: 0 % (ref 0–2)
KETONES UR STRIP-MCNC: NEGATIVE MG/DL
KETONES UR STRIP-MCNC: NEGATIVE MG/DL
LEUKOCYTE ESTERASE UR QL STRIP: ABNORMAL
LEUKOCYTE ESTERASE UR QL STRIP: ABNORMAL
LYMPHOCYTES # BLD AUTO: 2.52 THOUSANDS/ÂΜL (ref 0.6–4.47)
LYMPHOCYTES NFR BLD AUTO: 32 % (ref 14–44)
MCH RBC QN AUTO: 22.3 PG (ref 26.8–34.3)
MCHC RBC AUTO-ENTMCNC: 29 G/DL (ref 31.4–37.4)
MCV RBC AUTO: 77 FL (ref 82–98)
MONOCYTES # BLD AUTO: 1.07 THOUSAND/ÂΜL (ref 0.17–1.22)
MONOCYTES NFR BLD AUTO: 14 % (ref 4–12)
NEUTROPHILS # BLD AUTO: 3.88 THOUSANDS/ÂΜL (ref 1.85–7.62)
NEUTS SEG NFR BLD AUTO: 50 % (ref 43–75)
NITRITE UR QL STRIP: NEGATIVE
NITRITE UR QL STRIP: NEGATIVE
NON-SQ EPI CELLS URNS QL MICRO: ABNORMAL /HPF
NRBC BLD AUTO-RTO: 0 /100 WBCS
PH UR STRIP.AUTO: 6 [PH]
PH UR STRIP.AUTO: 6 [PH] (ref 4.5–8)
PLATELET # BLD AUTO: 356 THOUSANDS/UL (ref 149–390)
PMV BLD AUTO: 9.9 FL (ref 8.9–12.7)
POTASSIUM SERPL-SCNC: 3.6 MMOL/L (ref 3.5–5.3)
PROT SERPL-MCNC: 6.1 G/DL (ref 6.4–8.4)
PROT UR STRIP-MCNC: ABNORMAL MG/DL
PROT UR STRIP-MCNC: ABNORMAL MG/DL
QRS AXIS: 31 DEGREES
QRSD INTERVAL: 92 MS
QT INTERVAL: 322 MS
QTC INTERVAL: 451 MS
RBC # BLD AUTO: 3.77 MILLION/UL (ref 3.81–5.12)
RBC #/AREA URNS AUTO: ABNORMAL /HPF
SODIUM SERPL-SCNC: 137 MMOL/L (ref 135–147)
SP GR UR STRIP.AUTO: 1.02 (ref 1–1.03)
SP GR UR STRIP.AUTO: 1.02 (ref 1–1.03)
T WAVE AXIS: 64 DEGREES
UROBILINOGEN UR QL STRIP.AUTO: 0.2 E.U./DL
UROBILINOGEN UR STRIP-ACNC: <2 MG/DL
VENTRICULAR RATE: 118 BPM
WBC # BLD AUTO: 7.78 THOUSAND/UL (ref 4.31–10.16)
WBC #/AREA URNS AUTO: ABNORMAL /HPF

## 2024-07-10 PROCEDURE — 81001 URINALYSIS AUTO W/SCOPE: CPT | Performed by: EMERGENCY MEDICINE

## 2024-07-10 PROCEDURE — 93005 ELECTROCARDIOGRAM TRACING: CPT

## 2024-07-10 PROCEDURE — 87086 URINE CULTURE/COLONY COUNT: CPT | Performed by: EMERGENCY MEDICINE

## 2024-07-10 PROCEDURE — 93010 ELECTROCARDIOGRAM REPORT: CPT | Performed by: STUDENT IN AN ORGANIZED HEALTH CARE EDUCATION/TRAINING PROGRAM

## 2024-07-10 RX ORDER — NITROFURANTOIN 25; 75 MG/1; MG/1
100 CAPSULE ORAL 2 TIMES DAILY
Qty: 10 CAPSULE | Refills: 0 | Status: SHIPPED | OUTPATIENT
Start: 2024-07-10 | End: 2024-07-15

## 2024-07-10 RX ORDER — NITROFURANTOIN 25; 75 MG/1; MG/1
100 CAPSULE ORAL ONCE
Status: COMPLETED | OUTPATIENT
Start: 2024-07-10 | End: 2024-07-10

## 2024-07-10 RX ADMIN — NITROFURANTOIN MONOHYDRATE/MACROCRYSTALS 100 MG: 75; 25 CAPSULE ORAL at 01:01

## 2024-07-10 NOTE — ED NOTES
RN spoke with Michelle at [facility] regarding pt's prescription, ambulation status at baseline. Facility reports pt normally ambulates with a walker and has recently had an increase in falls.      Joelle Lambert RN  07/10/24 0670

## 2024-07-11 LAB — BACTERIA UR CULT: NORMAL

## 2024-07-28 ENCOUNTER — APPOINTMENT (EMERGENCY)
Dept: CT IMAGING | Facility: HOSPITAL | Age: 89
DRG: 070 | End: 2024-07-28
Payer: MEDICARE

## 2024-07-28 ENCOUNTER — HOSPITAL ENCOUNTER (INPATIENT)
Facility: HOSPITAL | Age: 89
LOS: 7 days | Discharge: NURSING, REHAB OR LONG-TERM CARE | DRG: 070 | End: 2024-08-04
Attending: EMERGENCY MEDICINE | Admitting: INTERNAL MEDICINE
Payer: MEDICARE

## 2024-07-28 DIAGNOSIS — I50.9 HEART FAILURE (HCC): ICD-10-CM

## 2024-07-28 DIAGNOSIS — I48.91 ATRIAL FIBRILLATION (HCC): ICD-10-CM

## 2024-07-28 DIAGNOSIS — I50.31 ACUTE DIASTOLIC HEART FAILURE (HCC): ICD-10-CM

## 2024-07-28 DIAGNOSIS — Z91.81 HX OF FALL: ICD-10-CM

## 2024-07-28 DIAGNOSIS — I48.92 ATRIAL FLUTTER (HCC): ICD-10-CM

## 2024-07-28 DIAGNOSIS — D64.9 ANEMIA: ICD-10-CM

## 2024-07-28 DIAGNOSIS — R41.82 ALTERED MENTAL STATUS: Primary | ICD-10-CM

## 2024-07-28 DIAGNOSIS — N39.0 ACUTE URINARY TRACT INFECTION: ICD-10-CM

## 2024-07-28 PROBLEM — I10 HYPERTENSION: Status: ACTIVE | Noted: 2024-07-28

## 2024-07-28 LAB
ANION GAP SERPL CALCULATED.3IONS-SCNC: 7 MMOL/L (ref 4–13)
ATRIAL RATE: 108 BPM
BACTERIA UR QL AUTO: ABNORMAL /HPF
BASOPHILS # BLD AUTO: 0.06 THOUSANDS/ÂΜL (ref 0–0.1)
BASOPHILS NFR BLD AUTO: 1 % (ref 0–1)
BILIRUB UR QL STRIP: NEGATIVE
BUN SERPL-MCNC: 39 MG/DL (ref 5–25)
CALCIUM SERPL-MCNC: 8.1 MG/DL (ref 8.4–10.2)
CHLORIDE SERPL-SCNC: 109 MMOL/L (ref 96–108)
CLARITY UR: ABNORMAL
CO2 SERPL-SCNC: 24 MMOL/L (ref 21–32)
COLOR UR: YELLOW
CREAT SERPL-MCNC: 1.19 MG/DL (ref 0.6–1.3)
EOSINOPHIL # BLD AUTO: 0.12 THOUSAND/ÂΜL (ref 0–0.61)
EOSINOPHIL NFR BLD AUTO: 1 % (ref 0–6)
ERYTHROCYTE [DISTWIDTH] IN BLOOD BY AUTOMATED COUNT: 18 % (ref 11.6–15.1)
GFR SERPL CREATININE-BSD FRML MDRD: 41 ML/MIN/1.73SQ M
GLUCOSE SERPL-MCNC: 139 MG/DL (ref 65–140)
GLUCOSE UR STRIP-MCNC: NEGATIVE MG/DL
HCT VFR BLD AUTO: 27.2 % (ref 34.8–46.1)
HGB BLD-MCNC: 7.8 G/DL (ref 11.5–15.4)
HGB UR QL STRIP.AUTO: ABNORMAL
IMM GRANULOCYTES # BLD AUTO: 0.03 THOUSAND/UL (ref 0–0.2)
IMM GRANULOCYTES NFR BLD AUTO: 0 % (ref 0–2)
KETONES UR STRIP-MCNC: NEGATIVE MG/DL
LEUKOCYTE ESTERASE UR QL STRIP: ABNORMAL
LYMPHOCYTES # BLD AUTO: 1.94 THOUSANDS/ÂΜL (ref 0.6–4.47)
LYMPHOCYTES NFR BLD AUTO: 22 % (ref 14–44)
MCH RBC QN AUTO: 21.3 PG (ref 26.8–34.3)
MCHC RBC AUTO-ENTMCNC: 28.7 G/DL (ref 31.4–37.4)
MCV RBC AUTO: 74 FL (ref 82–98)
MONOCYTES # BLD AUTO: 1.44 THOUSAND/ÂΜL (ref 0.17–1.22)
MONOCYTES NFR BLD AUTO: 16 % (ref 4–12)
MUCOUS THREADS UR QL AUTO: ABNORMAL
NEUTROPHILS # BLD AUTO: 5.31 THOUSANDS/ÂΜL (ref 1.85–7.62)
NEUTS SEG NFR BLD AUTO: 60 % (ref 43–75)
NITRITE UR QL STRIP: NEGATIVE
NON-SQ EPI CELLS URNS QL MICRO: ABNORMAL /HPF
NRBC BLD AUTO-RTO: 0 /100 WBCS
PH UR STRIP.AUTO: 8 [PH]
PLATELET # BLD AUTO: 267 THOUSANDS/UL (ref 149–390)
PMV BLD AUTO: 9.8 FL (ref 8.9–12.7)
POTASSIUM SERPL-SCNC: 3.6 MMOL/L (ref 3.5–5.3)
PROT UR STRIP-MCNC: ABNORMAL MG/DL
QRS AXIS: 35 DEGREES
QRSD INTERVAL: 72 MS
QT INTERVAL: 296 MS
QTC INTERVAL: 409 MS
RBC # BLD AUTO: 3.67 MILLION/UL (ref 3.81–5.12)
RBC #/AREA URNS AUTO: ABNORMAL /HPF
SODIUM SERPL-SCNC: 140 MMOL/L (ref 135–147)
SP GR UR STRIP.AUTO: 1.03 (ref 1–1.03)
T WAVE AXIS: 76 DEGREES
UROBILINOGEN UR STRIP-ACNC: <2 MG/DL
VENTRICULAR RATE: 115 BPM
WBC # BLD AUTO: 8.9 THOUSAND/UL (ref 4.31–10.16)
WBC #/AREA URNS AUTO: ABNORMAL /HPF

## 2024-07-28 PROCEDURE — 87186 SC STD MICRODIL/AGAR DIL: CPT | Performed by: STUDENT IN AN ORGANIZED HEALTH CARE EDUCATION/TRAINING PROGRAM

## 2024-07-28 PROCEDURE — 85025 COMPLETE CBC W/AUTO DIFF WBC: CPT | Performed by: STUDENT IN AN ORGANIZED HEALTH CARE EDUCATION/TRAINING PROGRAM

## 2024-07-28 PROCEDURE — 81001 URINALYSIS AUTO W/SCOPE: CPT | Performed by: STUDENT IN AN ORGANIZED HEALTH CARE EDUCATION/TRAINING PROGRAM

## 2024-07-28 PROCEDURE — 87077 CULTURE AEROBIC IDENTIFY: CPT | Performed by: STUDENT IN AN ORGANIZED HEALTH CARE EDUCATION/TRAINING PROGRAM

## 2024-07-28 PROCEDURE — 99223 1ST HOSP IP/OBS HIGH 75: CPT | Performed by: STUDENT IN AN ORGANIZED HEALTH CARE EDUCATION/TRAINING PROGRAM

## 2024-07-28 PROCEDURE — 93010 ELECTROCARDIOGRAM REPORT: CPT | Performed by: STUDENT IN AN ORGANIZED HEALTH CARE EDUCATION/TRAINING PROGRAM

## 2024-07-28 PROCEDURE — 1123F ACP DISCUSS/DSCN MKR DOCD: CPT | Performed by: INTERNAL MEDICINE

## 2024-07-28 PROCEDURE — 96365 THER/PROPH/DIAG IV INF INIT: CPT

## 2024-07-28 PROCEDURE — 99285 EMERGENCY DEPT VISIT HI MDM: CPT | Performed by: EMERGENCY MEDICINE

## 2024-07-28 PROCEDURE — 36415 COLL VENOUS BLD VENIPUNCTURE: CPT | Performed by: STUDENT IN AN ORGANIZED HEALTH CARE EDUCATION/TRAINING PROGRAM

## 2024-07-28 PROCEDURE — 70450 CT HEAD/BRAIN W/O DYE: CPT

## 2024-07-28 PROCEDURE — 87086 URINE CULTURE/COLONY COUNT: CPT | Performed by: STUDENT IN AN ORGANIZED HEALTH CARE EDUCATION/TRAINING PROGRAM

## 2024-07-28 PROCEDURE — 72125 CT NECK SPINE W/O DYE: CPT

## 2024-07-28 PROCEDURE — 99285 EMERGENCY DEPT VISIT HI MDM: CPT

## 2024-07-28 PROCEDURE — 93005 ELECTROCARDIOGRAM TRACING: CPT

## 2024-07-28 PROCEDURE — 80048 BASIC METABOLIC PNL TOTAL CA: CPT | Performed by: STUDENT IN AN ORGANIZED HEALTH CARE EDUCATION/TRAINING PROGRAM

## 2024-07-28 RX ORDER — HEPARIN SODIUM 5000 [USP'U]/ML
5000 INJECTION, SOLUTION INTRAVENOUS; SUBCUTANEOUS EVERY 8 HOURS SCHEDULED
Status: DISCONTINUED | OUTPATIENT
Start: 2024-07-28 | End: 2024-07-28

## 2024-07-28 RX ORDER — BISACODYL 10 MG
10 SUPPOSITORY, RECTAL RECTAL DAILY PRN
Status: DISCONTINUED | OUTPATIENT
Start: 2024-07-28 | End: 2024-08-04 | Stop reason: HOSPADM

## 2024-07-28 RX ORDER — SODIUM CHLORIDE, SODIUM GLUCONATE, SODIUM ACETATE, POTASSIUM CHLORIDE, MAGNESIUM CHLORIDE, SODIUM PHOSPHATE, DIBASIC, AND POTASSIUM PHOSPHATE .53; .5; .37; .037; .03; .012; .00082 G/100ML; G/100ML; G/100ML; G/100ML; G/100ML; G/100ML; G/100ML
75 INJECTION, SOLUTION INTRAVENOUS CONTINUOUS
Status: DISCONTINUED | OUTPATIENT
Start: 2024-07-28 | End: 2024-07-29

## 2024-07-28 RX ORDER — METOPROLOL SUCCINATE 50 MG/1
50 TABLET, EXTENDED RELEASE ORAL DAILY
Status: DISCONTINUED | OUTPATIENT
Start: 2024-07-28 | End: 2024-07-29

## 2024-07-28 RX ORDER — LISINOPRIL 20 MG/1
20 TABLET ORAL DAILY
Status: DISCONTINUED | OUTPATIENT
Start: 2024-07-28 | End: 2024-08-02

## 2024-07-28 RX ORDER — MAGNESIUM HYDROXIDE/ALUMINUM HYDROXICE/SIMETHICONE 120; 1200; 1200 MG/30ML; MG/30ML; MG/30ML
30 SUSPENSION ORAL EVERY 6 HOURS PRN
Status: DISCONTINUED | OUTPATIENT
Start: 2024-07-28 | End: 2024-08-04 | Stop reason: HOSPADM

## 2024-07-28 RX ORDER — CARBAMAZEPINE 200 MG/1
200 TABLET ORAL 2 TIMES DAILY
Status: DISCONTINUED | OUTPATIENT
Start: 2024-07-28 | End: 2024-08-04 | Stop reason: HOSPADM

## 2024-07-28 RX ORDER — PANTOPRAZOLE SODIUM 20 MG/1
20 TABLET, DELAYED RELEASE ORAL
Status: DISCONTINUED | OUTPATIENT
Start: 2024-07-29 | End: 2024-08-04 | Stop reason: HOSPADM

## 2024-07-28 RX ORDER — BUSPIRONE HYDROCHLORIDE 5 MG/1
2.5 TABLET ORAL 2 TIMES DAILY
Status: DISCONTINUED | OUTPATIENT
Start: 2024-07-28 | End: 2024-08-04 | Stop reason: HOSPADM

## 2024-07-28 RX ORDER — ONDANSETRON 2 MG/ML
4 INJECTION INTRAMUSCULAR; INTRAVENOUS EVERY 6 HOURS PRN
Status: DISCONTINUED | OUTPATIENT
Start: 2024-07-28 | End: 2024-08-04 | Stop reason: HOSPADM

## 2024-07-28 RX ORDER — SERTRALINE HYDROCHLORIDE 25 MG/1
25 TABLET, FILM COATED ORAL DAILY
Status: DISCONTINUED | OUTPATIENT
Start: 2024-07-29 | End: 2024-08-04 | Stop reason: HOSPADM

## 2024-07-28 RX ORDER — ACETAMINOPHEN 325 MG/1
650 TABLET ORAL EVERY 6 HOURS PRN
Status: DISCONTINUED | OUTPATIENT
Start: 2024-07-28 | End: 2024-07-29

## 2024-07-28 RX ORDER — BUSPIRONE HYDROCHLORIDE 5 MG/1
2.5 TABLET ORAL 2 TIMES DAILY
COMMUNITY

## 2024-07-28 RX ORDER — AMLODIPINE BESYLATE 5 MG/1
5 TABLET ORAL DAILY
Status: DISCONTINUED | OUTPATIENT
Start: 2024-07-29 | End: 2024-08-02

## 2024-07-28 RX ORDER — LORAZEPAM 0.5 MG/1
0.5 TABLET ORAL
Status: DISCONTINUED | OUTPATIENT
Start: 2024-07-28 | End: 2024-07-31

## 2024-07-28 RX ORDER — ATORVASTATIN CALCIUM 80 MG/1
80 TABLET, FILM COATED ORAL
Status: DISCONTINUED | OUTPATIENT
Start: 2024-07-28 | End: 2024-08-04 | Stop reason: HOSPADM

## 2024-07-28 RX ORDER — MAG HYDROX/ALUMINUM HYD/SIMETH 400-400-40
1 SUSPENSION, ORAL (FINAL DOSE FORM) ORAL
COMMUNITY
End: 2024-08-04

## 2024-07-28 RX ADMIN — SODIUM CHLORIDE, SODIUM GLUCONATE, SODIUM ACETATE, POTASSIUM CHLORIDE, MAGNESIUM CHLORIDE, SODIUM PHOSPHATE, DIBASIC, AND POTASSIUM PHOSPHATE 75 ML/HR: .53; .5; .37; .037; .03; .012; .00082 INJECTION, SOLUTION INTRAVENOUS at 16:24

## 2024-07-28 RX ADMIN — CARBAMAZEPINE 200 MG: 200 TABLET ORAL at 18:56

## 2024-07-28 RX ADMIN — LISINOPRIL 20 MG: 20 TABLET ORAL at 16:15

## 2024-07-28 RX ADMIN — APIXABAN 5 MG: 5 TABLET, FILM COATED ORAL at 22:00

## 2024-07-28 RX ADMIN — LORAZEPAM 0.5 MG: 0.5 TABLET ORAL at 22:00

## 2024-07-28 RX ADMIN — ATORVASTATIN CALCIUM 80 MG: 80 TABLET, FILM COATED ORAL at 16:18

## 2024-07-28 RX ADMIN — DEXTROSE 1000 MG: 50 INJECTION, SOLUTION INTRAVENOUS at 14:08

## 2024-07-28 RX ADMIN — METOPROLOL SUCCINATE 50 MG: 50 TABLET, EXTENDED RELEASE ORAL at 16:17

## 2024-07-28 RX ADMIN — BUSPIRONE HYDROCHLORIDE 2.5 MG: 5 TABLET ORAL at 21:59

## 2024-07-28 RX ADMIN — ASPIRIN 81 MG: 81 TABLET, COATED ORAL at 16:17

## 2024-07-28 NOTE — ED PROVIDER NOTES
History  Chief Complaint   Patient presents with    Altered Mental Status     Per EMS, staff has noticed patient being not herself. Multiple falls and UTI's in the past month. Hx of dementia. PT able to follow commands     88 yo F w/PMHx as listed below, brought in by EMS from nursing facility for altered mental status, urinary retention, and multiple falls from her wheelchair. Pt currently complaint free, minimally compliant with exam. Alert to self.          Prior to Admission Medications   Prescriptions Last Dose Informant Patient Reported? Taking?   Acetaminophen 500 MG   Yes Yes   Sig: Take 500 mg by mouth every 4 (four) hours as needed   Cholecalciferol 2000 units CAPS   Yes Yes   Sig: Take 1 capsule by mouth 2 (two) times a day    LORazepam (ATIVAN) 0.5 mg tablet   Yes Yes   Sig: Take 0.5 mg by mouth daily at bedtime   METOPROLOL SUCCINATE ER PO   Yes Yes   Sig: Take 50 mg by mouth daily   Magnesium Hydroxide (MILK OF MAGNESIA PO)   Yes Yes   Sig: Take by mouth   Melatonin 5 MG TABS   Yes Yes   Sig: Take 5 mg by mouth daily at bedtime   amLODIPine (NORVASC) 5 mg tablet   Yes Yes   Sig: Take 5 mg by mouth   apixaban (ELIQUIS) 5 mg   Yes Yes   Sig: Take 5 mg by mouth 2 (two) times a day    aspirin (ECOTRIN LOW STRENGTH) 81 mg EC tablet   Yes Yes   Sig: Take 81 mg by mouth daily   atorvastatin (LIPITOR) 80 mg tablet   Yes Yes   Sig: Take 80 mg by mouth   bisacodyl (DULCOLAX) 10 mg suppository   Yes Yes   Sig: Insert 10 mg into the rectum daily as needed   busPIRone (BUSPAR) 5 mg tablet   Yes Yes   Sig: Take 2.5 mg by mouth 2 (two) times a day   calcium-vitamin D 250-100 MG-UNIT per tablet   Yes No   Sig: Take 1 tablet by mouth daily    carBAMazepine (CARBATROL) 200 mg 12 hr capsule   No Yes   Sig: TAKE ONE CAPSULE BY MOUTH TWICE DAILY   glycerin adult 2 g suppository   Yes Yes   Sig: Insert 1 suppository into the rectum   hydrochlorothiazide (MICROZIDE) 12.5 mg capsule Not Taking  Yes No   Sig: Take 12.5 mg by  mouth   Patient not taking: Reported on 8/11/2021   lisinopril (ZESTRIL) 20 mg tablet   Yes Yes   Sig: Take 20 mg by mouth daily   metFORMIN (GLUCOPHAGE-XR) 500 mg 24 hr tablet Not Taking  Yes No   Patient not taking: Reported on 7/9/2024   multivitamin (THERAGRAN) TABS   Yes Yes   Sig: Take 1 tablet by mouth   omeprazole (PriLOSEC) 20 mg delayed release capsule   Yes Yes   Sig: Take 20 mg by mouth 2 (two) times a day    sertraline (ZOLOFT) 25 mg tablet   Yes Yes   Sig: Take 25 mg by mouth   trimethoprim (PROLOPRIM) 100 mg tablet Not Taking  Yes No   Sig: Take 100 mg by mouth   Patient not taking: Reported on 7/9/2024      Facility-Administered Medications: None       Past Medical History:   Diagnosis Date    Dyslipidemia     Generalized seizure (HCC)     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    E-Cigarette/Vaping    E-Cigarette Use Never User      E-Cigarette/Vaping Substances    Nicotine No     THC No     CBD No     Flavoring No     Other No     Unknown No      Social History     Tobacco Use    Smoking status: Never    Smokeless tobacco: Never   Vaping Use    Vaping status: Never Used   Substance Use Topics    Alcohol use: Yes        Review of Systems   Unable to perform ROS: Mental status change       Physical Exam  ED Triage Vitals [07/28/24 1259]   Temperature Pulse Respirations Blood Pressure SpO2   97.7 °F (36.5 °C) (!) 118 20 137/71 98 %      Temp Source Heart Rate Source Patient Position - Orthostatic VS BP Location  FiO2 (%)   Oral Monitor Sitting Right arm --      Pain Score       --             Orthostatic Vital Signs  Vitals:    07/28/24 1344 07/28/24 1445 07/28/24 1615 07/28/24 1617   BP:  131/68 134/59    Pulse: 104 (!) 108  (!) 111   Patient Position - Orthostatic VS:  Lying         Physical Exam  Vitals reviewed.   HENT:      Head: Normocephalic.        Comments: Ecchymosis to face as above.      Mouth/Throat:      Mouth: Mucous membranes are moist.      Pharynx: Oropharynx is clear.   Cardiovascular:      Rate and Rhythm: Regular rhythm. Tachycardia present.   Pulmonary:      Effort: Pulmonary effort is normal.      Breath sounds: Normal breath sounds.   Abdominal:      General: Bowel sounds are normal.      Palpations: Abdomen is soft.      Tenderness: There is abdominal tenderness in the suprapubic area.   Musculoskeletal:         General: Normal range of motion.   Skin:     General: Skin is warm and dry.      Capillary Refill: Capillary refill takes less than 2 seconds.   Neurological:      Mental Status: She is easily aroused. Mental status is at baseline.      GCS: GCS eye subscore is 3. GCS verbal subscore is 4. GCS motor subscore is 6.         ED Medications  Medications   acetaminophen (TYLENOL) tablet 650 mg (has no administration in time range)   ondansetron (ZOFRAN) injection 4 mg (has no administration in time range)   aluminum-magnesium hydroxide-simethicone (MAALOX) oral suspension 30 mL (has no administration in time range)   multi-electrolyte (PLASMALYTE-A/ISOLYTE-S PH 7.4) IV solution (75 mL/hr Intravenous New Bag 7/28/24 1624)   ceftriaxone (ROCEPHIN) 1 g/50 mL in dextrose IVPB (has no administration in time range)   amLODIPine (NORVASC) tablet 5 mg (has no administration in time range)   apixaban (ELIQUIS) tablet 5 mg (has no administration in time range)   aspirin (ECOTRIN LOW STRENGTH) EC tablet 81 mg (81 mg Oral Given 7/28/24 1617)   atorvastatin (LIPITOR) tablet 80 mg (80 mg Oral Given 7/28/24  1618)   busPIRone (BUSPAR) tablet 2.5 mg (has no administration in time range)   bisacodyl (DULCOLAX) rectal suppository 10 mg (has no administration in time range)   carBAMazepine (TEGretol) tablet 200 mg (has no administration in time range)   lisinopril (ZESTRIL) tablet 20 mg (20 mg Oral Given 7/28/24 1615)   LORazepam (ATIVAN) tablet 0.5 mg (has no administration in time range)   metoprolol succinate (TOPROL-XL) 24 hr tablet 50 mg (50 mg Oral Given 7/28/24 1617)   pantoprazole (PROTONIX) EC tablet 20 mg (has no administration in time range)   sertraline (ZOLOFT) tablet 25 mg (has no administration in time range)   ceftriaxone (ROCEPHIN) 1 g/50 mL in dextrose IVPB (0 mg Intravenous Stopped 7/28/24 1438)       Diagnostic Studies  Results Reviewed       Procedure Component Value Units Date/Time    Basic metabolic panel [[id number]]  (Abnormal) Collected: 07/28/24 1329    Lab Status: Final result Specimen: Blood from Arm, Left Updated: 07/28/24 1405     Sodium 140 mmol/L      Potassium 3.6 mmol/L      Chloride 109 mmol/L      CO2 24 mmol/L      ANION GAP 7 mmol/L      BUN 39 mg/dL      Creatinine 1.19 mg/dL      Glucose 139 mg/dL      Calcium 8.1 mg/dL      eGFR 41 ml/min/1.73sq m     Narrative:      National Kid[employer] Foundation guidelines for Chronic Kidney Disease (CKD):     Stage 1 with normal or high GFR (GFR > 90 mL/min/1.73 square meters)    Stage 2 Mild CKD (GFR = 60-89 mL/min/1.73 square meters)    Stage 3A Moderate CKD (GFR = 45-59 mL/min/1.73 square meters)    Stage 3B Moderate CKD (GFR = 30-44 mL/min/1.73 square meters)    Stage 4 Severe CKD (GFR = 15-29 mL/min/1.73 square meters)    Stage 5 End Stage CKD (GFR <15 mL/min/1.73 square meters)  Note: GFR calculation is accurate only with a steady state creatinine    Urine Microscopic [[id number]]  (Abnormal) Collected: 07/28/24 1332    Lab Status: Final result Specimen: Urine, Clean Catch Updated: 07/28/24 1400     RBC, UA Innumerable /hpf      WBC, UA  Innumerable /hpf      Epithelial Cells Occasional /hpf      Bacteria, UA Moderate /hpf      MUCUS THREADS Occasional    Urine culture [[id number]] Collected: 07/28/24 1332    Lab Status: In process Specimen: Urine, Clean Catch Updated: 07/28/24 1400    UA w Reflex to Microscopic w Reflex to Culture [[id number]]  (Abnormal) Collected: 07/28/24 1332    Lab Status: Final result Specimen: Urine, Clean Catch Updated: 07/28/24 1352     Color, UA Yellow     Clarity, UA Turbid     Specific Gravity, UA 1.030     pH, UA 8.0     Leukocytes, UA Large     Nitrite, UA Negative     Protein,  (3+) mg/dl      Glucose, UA Negative mg/dl      Ketones, UA Negative mg/dl      Urobilinogen, UA <2.0 mg/dl      Bilirubin, UA Negative     Occult Blood, UA Large    CBC and differential [[id number]]  (Abnormal) Collected: 07/28/24 1329    Lab Status: Final result Specimen: Blood from Arm, Left Updated: 07/28/24 1343     WBC 8.90 Thousand/uL      RBC 3.67 Million/uL      Hemoglobin 7.8 g/dL      Hematocrit 27.2 %      MCV 74 fL      MCH 21.3 pg      MCHC 28.7 g/dL      RDW 18.0 %      MPV 9.8 fL      Platelets 267 Thousands/uL      nRBC 0 /100 WBCs      Segmented % 60 %      Immature Grans % 0 %      Lymphocytes % 22 %      Monocytes % 16 %      Eosinophils Relative 1 %      Basophils Relative 1 %      Absolute Neutrophils 5.31 Thousands/µL      Absolute Immature Grans 0.03 Thousand/uL      Absolute Lymphocytes 1.94 Thousands/µL      Absolute Monocytes 1.44 Thousand/µL      Eosinophils Absolute 0.12 Thousand/µL      Basophils Absolute 0.06 Thousands/µL                    CT head wo contrast   Final Result by Darrick Grant MD (07/28 1457)      Left frontal scalp hematoma extending into the left periorbital soft tissues. Otherwise, no acute intracranial abnormality.                  Workstation performed: [id number]         CT spine cervical without contrast   Final Result by Darrick Grant MD (07/28 1459)      No cervical spine fracture or  traumatic malalignment.      Moderate cervical spondylosis.      Left thyroid nodule which can be further evaluated with ultrasound.      Moderate bilateral pleural effusions.            Workstation performed: [id number]               Procedures  Procedures      ED Course                             SBIRT 22yo+      Flowsheet Row Most Recent Value   Initial Alcohol Screen: US AUDIT-C     1. How often do you have a drink containing alcohol? 0 Filed at: 07/28/2024 1259   2. How many drinks containing alcohol do you have on a typical day you are drinking?  0 Filed at: 07/28/2024 1259   3b. FEMALE Any Age, or MALE 65+: How often do you have 4 or more drinks on one occassion? 0 Filed at: 07/28/2024 1259   Audit-C Score 0 Filed at: 07/28/2024 1259   SUZY: How many times in the past year have you...    Used an illegal drug or used a prescription medication for non-medical reasons? Never Filed at: 07/28/2024 1400                  Medical Decision Making  Will order head CT to assess for poss fx/bleeding, lab work/UA to assess for infection/metabolic derangement. Final plans pending imaging.     Workup remarkable for UTI, will give abx and admit     Case discussed with SLIM, pt admitted to inpatient.     Amount and/or Complexity of Data Reviewed  Labs: ordered.  Radiology: ordered.          Disposition  Final diagnoses:   Altered mental status   Acute urinary tract infection   Anemia   Hx of fall     Time reflects when diagnosis was documented in both MDM as applicable and the Disposition within this note       Time User Action Codes Description Comment    7/28/2024  4:35 PM Darrick Richards Add [R41.82] Altered mental status     7/28/2024  4:35 PM Darrick Richards Add [N39.0] Acute urinary tract infection     7/28/2024  4:35 PM Darrick Richards Add [D64.9] Anemia     7/28/2024  4:36 PM Darrick Richards Add [Z91.81] Hx of fall           ED Disposition       None          Follow-up Information    None         Patient's Medications    Discharge Prescriptions    No medications on file     No discharge procedures on file.    PDMP Review       None             ED Provider  Attending physically available and evaluated Adrianna Macias. I managed the patient along with the ED Attending.    Electronically Signed by           Navid Galvez MD  07/28/24 3187

## 2024-07-28 NOTE — ED ATTENDING ATTESTATION
7/28/2024  I, Darrick Richards MD, saw and evaluated the patient. I have discussed the patient with the resident/non-physician practitioner and agree with the resident's/non-physician practitioner's findings, Plan of Care, and MDM as documented in the resident's/non-physician practitioner's note, except where noted. All available labs and Radiology studies were reviewed.  I was present for key portions of any procedure(s) performed by the resident/non-physician practitioner and I was immediately available to provide assistance.       At this point I agree with the current assessment done in the Emergency Department.  I have conducted an independent evaluation of this patient a history and physical is as follows:  89 y/o Demented female presents for evaluation of increased confusion and difficulty with urination.  Patient has suffered multiple falls recently.  She is not providing meaningful history.  Unable to perform review of systems.  On exam patient is in no acute distress, she has multiple bruises in various stages of healing, nonfocal neuroexam, cardiac/lung/abdomen exam within normal limits.  Medical decision making; I altered mental status and change in urination-will CT head to rule out acute CNS pathology such as stroke, bleed, mass, CT cervical spine rule out fracture, cardiac/septic workup to rule out ACS, pneumonia, urinary tract infection, electrolyte abnormality, dysrhythmia, admit.  ED Course         Critical Care Time  Procedures

## 2024-07-28 NOTE — ASSESSMENT & PLAN NOTE
Hemoglobin 7.8 on admission, previous baseline appears to be 9-10  Recent fall with multiple facial ecchymosis, scalp hematoma  Remained stable, no signs of active bleeding  B12, folic acid within normal limits  Ferritin low end of normal

## 2024-07-28 NOTE — H&P
[facility]  H&P  Name: Adrianna Macias 89 y.o. female I MRN: [id number]  Unit/Bed#: ED-24 I Date of Admission: 7/28/2024   Date of Service: 7/28/2024 I Hospital Day: 0      Assessment & Plan   UTI (urinary tract infection)  Assessment & Plan  UA concerning for infection, reporting urinary frequency and incontinence  Continue ceftriaxone, await cultures    Hypertension  Assessment & Plan  Continue Toprol-XL 3 mg twice daily, lisinopril 20 mg daily and Norvasc 5 mg daily    History of CVA (cerebrovascular accident)  Assessment & Plan  History of embolic CVA, continue aspirin, Eliquis and statin    History of seizure  Assessment & Plan  Continue Tegretol 200 mg twice daily    * AMS (altered mental status)  Assessment & Plan  89-year-old female presented with altered mental status, falls  Presented from [facility], history of dementia with confusion at baseline  Has had multiple falls in the past with subsequent left scalp hematoma  UA concerning for infection  Continue IV antibiotics with ceftriaxone, await urine cultures  PT and OT to evaluate         Anemia  Hemoglobin 7.8 on admission, previous baseline appears to be 9-10  Recent fall with multiple facial ecchymosis, scalp hematoma  Check anemia labs with B12, folate, iron panel and TSH  Trend H&H  Resume Eliquis, aspirin.  No current evidence of bleeding    VTE Prophylaxis: Apixaban (Eliquis)  / sequential compression device   Code Status: FC  POLST: There is no POLST form on file for this patient (pre-hospital)    Anticipated Length of Stay:  Patient will be admitted on an Inpatient basis with an anticipated length of stay of  2 midnights.   Justification for Hospital Stay: IV abx, UTI, PT/OT evaluation    Chief Complaint:   AMS    History of Present Illness:    Adrianna Macias is a 89 y.o. female who presents with altered mental status.  History of CVA on Eliquis, HTN, A-fib, dementia and seizures.  Presented from [facility]  nursing home, with reports of increasing falls, change in mental status.  Patient is a poor historian, history obtained from nursing home documentation and ER provider.  Notes that she has had increasing urinary frequency, has been incontinent with worsening anxiety.    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 Authorizing Provider   Acetaminophen 500 MG Take 500 mg by mouth every 4 (four) hours as needed   Yes Historical Provider, MD   amLODIPine (NORVASC) 5 mg tablet Take 5 mg by mouth 10/8/18  Yes Historical Provider, MD   apixaban (ELIQUIS) 5 mg Take 5 mg by mouth 2 (two) times a day  11/5/18  Yes Historical Provider, MD   aspirin (ECOTRIN LOW STRENGTH) 81 mg EC tablet Take 81 mg by mouth daily 1/23/19  Yes Historical Provider, MD   atorvastatin (LIPITOR) 80 mg tablet Take 80 mg by mouth 10/8/18  Yes Historical Provider, MD   bisacodyl (DULCOLAX) 10 mg suppository Insert 10 mg into the rectum daily as needed   Yes Historical Provider, MD   busPIRone (BUSPAR) 5 mg tablet Take 2.5 mg by mouth 2 (two) times a day   Yes Historical Provider, MD   carBAMazepine (CARBATROL) 200 mg 12 hr capsule TAKE ONE CAPSULE BY MOUTH TWICE DAILY 11/27/22  Yes Jennifer Sesay PA-C   Cholecalciferol 2000 units CAPS Take 1 capsule by mouth 2 (two) times a day    Yes Historical Provider, MD   glycerin  "adult 2 g suppository Insert 1 suppository into the rectum   Yes Historical Provider, MD   lisinopril (ZESTRIL) 20 mg tablet Take 20 mg by mouth daily 10/8/18  Yes Historical Provider, MD   LORazepam (ATIVAN) 0.5 mg tablet Take 0.5 mg by mouth daily at bedtime 4/12/24  Yes Historical Provider, MD   Magnesium Hydroxide (MILK OF MAGNESIA PO) Take by mouth   Yes Historical Provider, MD   Melatonin 5 MG TABS Take 5 mg by mouth daily at bedtime   Yes Historical Provider, MD   METOPROLOL SUCCINATE ER PO Take 50 mg by mouth daily   Yes Historical Provider, MD   multivitamin (THERAGRAN) TABS Take 1 tablet by mouth   Yes Historical Provider, MD   omeprazole (PriLOSEC) 20 mg delayed release capsule Take 20 mg by mouth 2 (two) times a day  1/12/10  Yes Historical Provider, MD   sertraline (ZOLOFT) 25 mg tablet Take 25 mg by mouth 10/8/18  Yes Historical Provider, MD   calcium-vitamin D 250-100 MG-UNIT per tablet Take 1 tablet by mouth daily     Historical Provider, MD   hydrochlorothiazide (MICROZIDE) 12.5 mg capsule Take 12.5 mg by mouth  Patient not taking: Reported on 8/11/2021 10/8/18   Historical Provider, MD   metFORMIN (GLUCOPHAGE-XR) 500 mg 24 hr tablet  10/4/22   Historical Provider, MD   trimethoprim (PROLOPRIM) 100 mg tablet Take 100 mg by mouth  Patient not taking: Reported on 7/9/2024    Historical Provider, MD     I have reviewed home medications with patient personally.    Allergies:   Allergies   Allergen Reactions    Citalopram      Other reaction(s): Other (See Comments)  insomnia    Dextrose 5%-Electrolyte #48     Levofloxacin      Other reaction(s): Other (See Comments)  \"feels lousey\"    Nitrofurantoin Other (See Comments)     diarrhea    Paroxetine Other (See Comments)     nausea    Pseudoephedrine Other (See Comments)     insomnia       Social History:     Marital Status: /Civil Union   Occupation: retired  Patient Pre-hospital Living Situation: nursing home  Patient Pre-hospital Level of " Mobility: amb with device  Patient Pre-hospital Diet Restrictions: none  Substance Use History:   Social History     Substance and Sexual Activity   Alcohol Use Yes     Social History     Tobacco Use   Smoking Status Never   Smokeless Tobacco Never     Social History     Substance and Sexual Activity   Drug Use Not on file       Family History:    Family History   Problem Relation Age of Onset    Alzheimer's disease Mother 80        mid       Physical Exam:     Vitals:   Blood Pressure: 131/68 (07/28/24 1445)  Pulse: (!) 108 (07/28/24 1445)  Temperature: 97.7 °F (36.5 °C) (07/28/24 1259)  Temp Source: Oral (07/28/24 1259)  Respirations: 19 (07/28/24 1445)  SpO2: 98 % (07/28/24 1445)    Physical Exam  Vitals and nursing note reviewed.   Constitutional:       Comments: Left scalp hematoma, facial ecchymosis   HENT:      Head: Normocephalic.   Eyes:      Conjunctiva/sclera: Conjunctivae normal.   Cardiovascular:      Rate and Rhythm: Normal rate. Rhythm irregular.      Heart sounds: Normal heart sounds.   Pulmonary:      Effort: Pulmonary effort is normal. No respiratory distress.      Breath sounds: No wheezing.   Abdominal:      General: Bowel sounds are normal. There is no distension.      Palpations: Abdomen is soft.   Musculoskeletal:         General: No swelling.   Skin:     General: Skin is warm.      Findings: Bruising present.   Neurological:      Mental Status: She is alert. She is disoriented.         Additional Data:     Lab Results: I have personally reviewed pertinent reports.      Results from last 7 days   Lab Units 07/28/24  1329   WBC Thousand/uL 8.90   HEMOGLOBIN g/dL 7.8*   HEMATOCRIT % 27.2*   PLATELETS Thousands/uL 267   SEGS PCT % 60   LYMPHO PCT % 22   MONO PCT % 16*   EOS PCT % 1     Results from last 7 days   Lab Units 07/28/24  1329   SODIUM mmol/L 140   POTASSIUM mmol/L 3.6   CHLORIDE mmol/L 109*   CO2 mmol/L 24   BUN mg/dL 39*   CREATININE mg/dL 1.19   ANION GAP mmol/L 7   CALCIUM mg/dL  8.1*   GLUCOSE RANDOM mg/dL 139                       Imaging: I have personally reviewed pertinent reports.      CT head wo contrast   Final Result by Darrick Grant MD (07/28 1457)      Left frontal scalp hematoma extending into the left periorbital soft tissues. Otherwise, no acute intracranial abnormality.                  Workstation performed: [id number]         CT spine cervical without contrast   Final Result by Darrick Grant MD (07/28 1459)      No cervical spine fracture or traumatic malalignment.      Moderate cervical spondylosis.      Left thyroid nodule which can be further evaluated with ultrasound.      Moderate bilateral pleural effusions.            Workstation performed: [id number]             EKG, Pathology, and Other Studies Reviewed on Admission:   EKG: Afib    Allscripts / Epic Records Reviewed: Yes     ** Please Note: This note has been constructed using a voice recognition system. **

## 2024-07-28 NOTE — ASSESSMENT & PLAN NOTE
89-year-old female presented with altered mental status, falls  Presented from [facility], history of dementia with confusion at baseline  Has had multiple falls in the past with subsequent left scalp hematoma  UA concerning for infection  Continue IV antibiotics with ceftriaxone, await urine cultures  PT and OT to evaluate

## 2024-07-28 NOTE — ASSESSMENT & PLAN NOTE
UA concerning for infection, reporting urinary frequency and incontinence  Continue ceftriaxone, await cultures

## 2024-07-29 ENCOUNTER — APPOINTMENT (INPATIENT)
Dept: RADIOLOGY | Facility: HOSPITAL | Age: 89
DRG: 070 | End: 2024-07-29
Payer: MEDICARE

## 2024-07-29 PROBLEM — G93.41 METABOLIC ENCEPHALOPATHY: Status: ACTIVE | Noted: 2024-07-28

## 2024-07-29 PROBLEM — I48.92 ATRIAL FLUTTER (HCC): Status: ACTIVE | Noted: 2024-07-29

## 2024-07-29 LAB
ANION GAP SERPL CALCULATED.3IONS-SCNC: 7 MMOL/L (ref 4–13)
BASOPHILS # BLD AUTO: 0.04 THOUSANDS/ÂΜL (ref 0–0.1)
BASOPHILS NFR BLD AUTO: 0 % (ref 0–1)
BUN SERPL-MCNC: 39 MG/DL (ref 5–25)
CALCIUM SERPL-MCNC: 8.1 MG/DL (ref 8.4–10.2)
CHLORIDE SERPL-SCNC: 108 MMOL/L (ref 96–108)
CO2 SERPL-SCNC: 23 MMOL/L (ref 21–32)
CREAT SERPL-MCNC: 1.03 MG/DL (ref 0.6–1.3)
EOSINOPHIL # BLD AUTO: 0.01 THOUSAND/ÂΜL (ref 0–0.61)
EOSINOPHIL NFR BLD AUTO: 0 % (ref 0–6)
ERYTHROCYTE [DISTWIDTH] IN BLOOD BY AUTOMATED COUNT: 18.1 % (ref 11.6–15.1)
FERRITIN SERPL-MCNC: 12 NG/ML (ref 11–307)
FOLATE SERPL-MCNC: 7.6 NG/ML
GFR SERPL CREATININE-BSD FRML MDRD: 48 ML/MIN/1.73SQ M
GLUCOSE SERPL-MCNC: 149 MG/DL (ref 65–140)
HCT VFR BLD AUTO: 26.7 % (ref 34.8–46.1)
HGB BLD-MCNC: 7.8 G/DL (ref 11.5–15.4)
IMM GRANULOCYTES # BLD AUTO: 0.04 THOUSAND/UL (ref 0–0.2)
IMM GRANULOCYTES NFR BLD AUTO: 0 % (ref 0–2)
IRON SERPL-MCNC: <10 UG/DL (ref 50–212)
LYMPHOCYTES # BLD AUTO: 1.12 THOUSANDS/ÂΜL (ref 0.6–4.47)
LYMPHOCYTES NFR BLD AUTO: 13 % (ref 14–44)
MCH RBC QN AUTO: 21.8 PG (ref 26.8–34.3)
MCHC RBC AUTO-ENTMCNC: 29.2 G/DL (ref 31.4–37.4)
MCV RBC AUTO: 75 FL (ref 82–98)
MONOCYTES # BLD AUTO: 0.95 THOUSAND/ÂΜL (ref 0.17–1.22)
MONOCYTES NFR BLD AUTO: 11 % (ref 4–12)
NEUTROPHILS # BLD AUTO: 6.83 THOUSANDS/ÂΜL (ref 1.85–7.62)
NEUTS SEG NFR BLD AUTO: 76 % (ref 43–75)
NRBC BLD AUTO-RTO: 0 /100 WBCS
PLATELET # BLD AUTO: 262 THOUSANDS/UL (ref 149–390)
PMV BLD AUTO: 9.8 FL (ref 8.9–12.7)
POTASSIUM SERPL-SCNC: 4.1 MMOL/L (ref 3.5–5.3)
RBC # BLD AUTO: 3.58 MILLION/UL (ref 3.81–5.12)
SODIUM SERPL-SCNC: 138 MMOL/L (ref 135–147)
TSH SERPL DL<=0.05 MIU/L-ACNC: 1.4 UIU/ML (ref 0.45–4.5)
UIBC SERPL-MCNC: 349 UG/DL (ref 155–355)
VIT B12 SERPL-MCNC: 188 PG/ML (ref 180–914)
WBC # BLD AUTO: 8.99 THOUSAND/UL (ref 4.31–10.16)

## 2024-07-29 PROCEDURE — 99223 1ST HOSP IP/OBS HIGH 75: CPT

## 2024-07-29 PROCEDURE — 83540 ASSAY OF IRON: CPT | Performed by: STUDENT IN AN ORGANIZED HEALTH CARE EDUCATION/TRAINING PROGRAM

## 2024-07-29 PROCEDURE — 85025 COMPLETE CBC W/AUTO DIFF WBC: CPT | Performed by: STUDENT IN AN ORGANIZED HEALTH CARE EDUCATION/TRAINING PROGRAM

## 2024-07-29 PROCEDURE — 82607 VITAMIN B-12: CPT | Performed by: STUDENT IN AN ORGANIZED HEALTH CARE EDUCATION/TRAINING PROGRAM

## 2024-07-29 PROCEDURE — 82728 ASSAY OF FERRITIN: CPT | Performed by: STUDENT IN AN ORGANIZED HEALTH CARE EDUCATION/TRAINING PROGRAM

## 2024-07-29 PROCEDURE — 73521 X-RAY EXAM HIPS BI 2 VIEWS: CPT

## 2024-07-29 PROCEDURE — 80048 BASIC METABOLIC PNL TOTAL CA: CPT | Performed by: STUDENT IN AN ORGANIZED HEALTH CARE EDUCATION/TRAINING PROGRAM

## 2024-07-29 PROCEDURE — 71045 X-RAY EXAM CHEST 1 VIEW: CPT

## 2024-07-29 PROCEDURE — 84443 ASSAY THYROID STIM HORMONE: CPT | Performed by: STUDENT IN AN ORGANIZED HEALTH CARE EDUCATION/TRAINING PROGRAM

## 2024-07-29 PROCEDURE — 99232 SBSQ HOSP IP/OBS MODERATE 35: CPT | Performed by: INTERNAL MEDICINE

## 2024-07-29 PROCEDURE — 83550 IRON BINDING TEST: CPT | Performed by: STUDENT IN AN ORGANIZED HEALTH CARE EDUCATION/TRAINING PROGRAM

## 2024-07-29 PROCEDURE — 82746 ASSAY OF FOLIC ACID SERUM: CPT | Performed by: STUDENT IN AN ORGANIZED HEALTH CARE EDUCATION/TRAINING PROGRAM

## 2024-07-29 RX ORDER — ONDANSETRON 4 MG/1
4 TABLET, FILM COATED ORAL DAILY PRN
COMMUNITY
End: 2024-08-04

## 2024-07-29 RX ORDER — WATER 10 ML/10ML
INJECTION INTRAMUSCULAR; INTRAVENOUS; SUBCUTANEOUS
Status: COMPLETED
Start: 2024-07-29 | End: 2024-07-29

## 2024-07-29 RX ORDER — FERROUS SULFATE 325(65) MG
325 TABLET ORAL
Status: DISCONTINUED | OUTPATIENT
Start: 2024-07-30 | End: 2024-08-04 | Stop reason: HOSPADM

## 2024-07-29 RX ORDER — METOPROLOL SUCCINATE 50 MG/1
50 TABLET, EXTENDED RELEASE ORAL 2 TIMES DAILY
Status: DISCONTINUED | OUTPATIENT
Start: 2024-07-29 | End: 2024-07-31

## 2024-07-29 RX ORDER — METOPROLOL TARTRATE 1 MG/ML
5 INJECTION, SOLUTION INTRAVENOUS EVERY 4 HOURS PRN
Status: DISCONTINUED | OUTPATIENT
Start: 2024-07-29 | End: 2024-08-04 | Stop reason: HOSPADM

## 2024-07-29 RX ORDER — ACETAMINOPHEN 325 MG/1
975 TABLET ORAL EVERY 8 HOURS SCHEDULED
Status: DISCONTINUED | OUTPATIENT
Start: 2024-07-29 | End: 2024-08-04 | Stop reason: HOSPADM

## 2024-07-29 RX ORDER — OLANZAPINE 10 MG/2ML
2.5 INJECTION, POWDER, FOR SOLUTION INTRAMUSCULAR EVERY 8 HOURS PRN
Status: DISCONTINUED | OUTPATIENT
Start: 2024-07-29 | End: 2024-08-04 | Stop reason: HOSPADM

## 2024-07-29 RX ORDER — METOPROLOL TARTRATE 1 MG/ML
5 INJECTION, SOLUTION INTRAVENOUS EVERY 6 HOURS PRN
Status: DISCONTINUED | OUTPATIENT
Start: 2024-07-29 | End: 2024-07-29

## 2024-07-29 RX ORDER — FUROSEMIDE 10 MG/ML
40 INJECTION INTRAMUSCULAR; INTRAVENOUS ONCE
Status: COMPLETED | OUTPATIENT
Start: 2024-07-29 | End: 2024-07-29

## 2024-07-29 RX ADMIN — LORAZEPAM 0.5 MG: 0.5 TABLET ORAL at 21:45

## 2024-07-29 RX ADMIN — ASPIRIN 81 MG: 81 TABLET, COATED ORAL at 08:30

## 2024-07-29 RX ADMIN — SERTRALINE HYDROCHLORIDE 25 MG: 25 TABLET ORAL at 08:30

## 2024-07-29 RX ADMIN — DEXTROSE 1000 MG: 50 INJECTION, SOLUTION INTRAVENOUS at 13:49

## 2024-07-29 RX ADMIN — METOROPROLOL TARTRATE 5 MG: 5 INJECTION, SOLUTION INTRAVENOUS at 16:42

## 2024-07-29 RX ADMIN — BUSPIRONE HYDROCHLORIDE 2.5 MG: 5 TABLET ORAL at 20:16

## 2024-07-29 RX ADMIN — ACETAMINOPHEN 975 MG: 325 TABLET, FILM COATED ORAL at 21:45

## 2024-07-29 RX ADMIN — CARBAMAZEPINE 200 MG: 200 TABLET ORAL at 08:30

## 2024-07-29 RX ADMIN — METOPROLOL SUCCINATE 50 MG: 50 TABLET, EXTENDED RELEASE ORAL at 20:16

## 2024-07-29 RX ADMIN — BUSPIRONE HYDROCHLORIDE 2.5 MG: 5 TABLET ORAL at 08:30

## 2024-07-29 RX ADMIN — WATER 10 ML: 1 INJECTION INTRAMUSCULAR; INTRAVENOUS; SUBCUTANEOUS at 18:01

## 2024-07-29 RX ADMIN — LISINOPRIL 20 MG: 20 TABLET ORAL at 08:30

## 2024-07-29 RX ADMIN — OLANZAPINE 2.5 MG: 10 INJECTION, POWDER, FOR SOLUTION INTRAMUSCULAR at 18:01

## 2024-07-29 RX ADMIN — METOROPROLOL TARTRATE 5 MG: 5 INJECTION, SOLUTION INTRAVENOUS at 18:55

## 2024-07-29 RX ADMIN — PANTOPRAZOLE SODIUM 20 MG: 20 TABLET, DELAYED RELEASE ORAL at 06:02

## 2024-07-29 RX ADMIN — AMLODIPINE BESYLATE 5 MG: 5 TABLET ORAL at 08:30

## 2024-07-29 RX ADMIN — FUROSEMIDE 40 MG: 10 INJECTION, SOLUTION INTRAMUSCULAR; INTRAVENOUS at 19:33

## 2024-07-29 RX ADMIN — ATORVASTATIN CALCIUM 80 MG: 80 TABLET, FILM COATED ORAL at 16:41

## 2024-07-29 RX ADMIN — CARBAMAZEPINE 200 MG: 200 TABLET ORAL at 17:01

## 2024-07-29 RX ADMIN — METOPROLOL SUCCINATE 50 MG: 50 TABLET, EXTENDED RELEASE ORAL at 08:30

## 2024-07-29 NOTE — PLAN OF CARE
Problem: Potential for Falls  Goal: Patient will remain free of falls  Description: INTERVENTIONS:  - Educate patient/family on patient safety including physical limitations  - Instruct patient to call for assistance with activity   - Consult OT/PT to assist with strengthening/mobility   - Keep Call bell within reach  - Keep bed low and locked with side rails adjusted as appropriate  - Keep care items and personal belongings within reach  - Initiate and maintain comfort rounds  - Make Fall Risk Sign visible to staff  - Offer Toileting every 2 Hours, in advance of need  - Initiate/Maintain bed alarm  - Obtain necessary fall risk management equipment: yellow socks, yellow bracelet, bed alarm  - Apply yellow socks and bracelet for high fall risk patients  - Consider moving patient to room near nurses station  Outcome: Progressing     Problem: Prexisting or High Potential for Compromised Skin Integrity  Goal: Skin integrity is maintained or improved  Description: INTERVENTIONS:  - Identify patients at risk for skin breakdown  - Assess and monitor skin integrity  - Assess and monitor nutrition and hydration status  - Monitor labs   - Assess for incontinence   - Turn and reposition patient  - Assist with mobility/ambulation  - Relieve pressure over bony prominences  - Avoid friction and shearing  - Provide appropriate hygiene as needed including keeping skin clean and dry  - Evaluate need for skin moisturizer/barrier cream  - Collaborate with interdisciplinary team   - Patient/family teaching  - Consider wound care consult   Outcome: Progressing     Problem: PAIN - ADULT  Goal: Verbalizes/displays adequate comfort level or baseline comfort level  Description: Interventions:  - Encourage patient to monitor pain and request assistance  - Assess pain using appropriate pain scale  - Administer analgesics based on type and severity of pain and evaluate response  - Implement non-pharmacological measures as appropriate and  evaluate response  - Consider cultural and social influences on pain and pain management  - Notify physician/advanced practitioner if interventions unsuccessful or patient reports new pain  Outcome: Progressing     Problem: INFECTION - ADULT  Goal: Absence or prevention of progression during hospitalization  Description: INTERVENTIONS:  - Assess and monitor for signs and symptoms of infection  - Monitor lab/diagnostic results  - Monitor all insertion sites, i.e. indwelling lines, tubes, and drains  - Monitor endotracheal if appropriate and nasal secretions for changes in amount and color  - [place] appropriate cooling/warming therapies per order  - Administer medications as ordered  - Instruct and encourage patient and family to use good hand hygiene technique  - Identify and instruct in appropriate isolation precautions for identified infection/condition  Outcome: Progressing     Problem: SAFETY ADULT  Goal: Patient will remain free of falls  Description: INTERVENTIONS:  - Educate patient/family on patient safety including physical limitations  - Instruct patient to call for assistance with activity   - Consult OT/PT to assist with strengthening/mobility   - Keep Call bell within reach  - Keep bed low and locked with side rails adjusted as appropriate  - Keep care items and personal belongings within reach  - Initiate and maintain comfort rounds  - Make Fall Risk Sign visible to staff  - Offer Toileting every 2 Hours, in advance of need  - Initiate/Maintain bed alarm  - Obtain necessary fall risk management equipment: yellow socks, yellow bracelet, bed alarm  - Apply yellow socks and bracelet for high fall risk patients  - Consider moving patient to room near nurses station  Outcome: Progressing     Problem: DISCHARGE PLANNING  Goal: Discharge to home or other facility with appropriate resources  Description: INTERVENTIONS:  - Identify barriers to discharge w/patient and caregiver  - Arrange for needed discharge  resources and transportation as appropriate  - Identify discharge learning needs (meds, wound care, etc.)  - Arrange for interpretive services to assist at discharge as needed  - Refer to Case Management Department for coordinating discharge planning if the patient needs post-hospital services based on physician/advanced practitioner order or complex needs related to functional status, cognitive ability, or social support system  Outcome: Progressing     Problem: Knowledge Deficit  Goal: Patient/family/caregiver demonstrates understanding of disease process, treatment plan, medications, and discharge instructions  Description: Complete learning assessment and assess knowledge base.  Interventions:  - Provide teaching at level of understanding  - Provide teaching via preferred learning methods  Outcome: Progressing     Problem: GENITOURINARY - ADULT  Goal: Maintains or returns to baseline urinary function  Description: INTERVENTIONS:  - Assess urinary function  - Encourage oral fluids to ensure adequate hydration if ordered  - Administer IV fluids as ordered to ensure adequate hydration  - Administer ordered medications as needed  - Offer frequent toileting  - Follow urinary retention protocol if ordered  Outcome: Progressing     Problem: SKIN/TISSUE INTEGRITY - ADULT  Goal: Skin Integrity remains intact(Skin Breakdown Prevention)  Description: Assess:  -Perform Willy assessment every shift  -Clean and moisturize skin  -Inspect skin when repositioning, toileting, and assisting with ADLS  -Assess under medical devices   -Assess extremities for adequate circulation and sensation     Bed Management:  -Have minimal linens on bed & keep smooth, unwrinkled  -Change linens as needed when moist or perspiring  -Avoid sitting or lying in one position for more than 2 hours while in bed  -Keep HOB at 30 degrees     Toileting:  -Offer bedside commode  -Assess for incontinence every 2 hours  -Use incontinent care products after  each incontinent episode    Activity:  -Mobilize patient 3 times a day  -Encourage activity and walks on unit  -Encourage or provide ROM exercises   -Turn and reposition patient every 2 Hours  -Use appropriate equipment to lift or move patient in bed  -Instruct/ Assist with weight shifting when out of bed in chair  -Consider limitation of chair time 1/2 hour intervals    Skin Care:  -Avoid use of baby powder, tape, friction and shearing, hot water or constrictive clothing  -Relieve pressure over bony prominences using allevyn  -Do not massage red bony areas    Next Steps:  -Teach patient strategies to minimize risks such as falls   -Consider consults to  interdisciplinary teams such as PT, OT  Outcome: Progressing     Problem: MUSCULOSKELETAL - ADULT  Goal: Maintain or return mobility to safest level of function  Description: INTERVENTIONS:  - Assess patient's ability to carry out ADLs; assess patient's baseline for ADL function and identify physical deficits which impact ability to perform ADLs (bathing, care of mouth/teeth, toileting, grooming, dressing, etc.)  - Assess/evaluate cause of self-care deficits   - Assess range of motion  - Assess patient's mobility  - Assess patient's need for assistive devices and provide as appropriate  - Encourage maximum independence but intervene and supervise when necessary  - Involve family in performance of ADLs  - Assess for home care needs following discharge   - Consider OT consult to assist with ADL evaluation and planning for discharge  - Provide patient education as appropriate  Outcome: Progressing     Problem: SAFETY,RESTRAINT: NV/NON-SELF DESTRUCTIVE BEHAVIOR  Goal: Remains free of harm/injury (restraint for non violent/non self-detsructive behavior)  Description: INTERVENTIONS:  - Instruct patient/family regarding restraint use   - Assess and monitor physiologic and psychological status   - Provide interventions and comfort measures to meet assessed patient needs   -  Identify and implement measures to help patient regain control  - Assess readiness for release of restraint   Outcome: Progressing  Goal: Returns to optimal restraint-free functioning  Description: INTERVENTIONS:  - Assess the patient's behavior and symptoms that indicate continued need for restraint  - Identify and implement measures to help patient regain control  - Assess readiness for release of restraint   Outcome: Progressing     Problem: NEUROSENSORY - ADULT  Goal: Achieves maximal functionality and self care  Description: INTERVENTIONS  - Monitor swallowing and airway patency with patient fatigue and changes in neurological status  - Encourage and assist patient to increase activity and self care.   - Encourage visually impaired, hearing impaired and aphasic patients to use assistive/communication devices  Outcome: Progressing     Problem: CARDIOVASCULAR - ADULT  Goal: Maintains optimal cardiac output and hemodynamic stability  Description: INTERVENTIONS:  - Monitor I/O, vital signs and rhythm  - Monitor for S/S and trends of decreased cardiac output  - Administer and titrate ordered vasoactive medications to optimize hemodynamic stability  - Assess quality of pulses, skin color and temperature  - Assess for signs of decreased coronary artery perfusion  - Instruct patient to report change in severity of symptoms  Outcome: Progressing

## 2024-07-29 NOTE — ASSESSMENT & PLAN NOTE
Patient presents with atrial flutter with RVR.  Suspect secondary to infection  Heart rates between 100s and 130s this morning  Will increase metoprolol to 50 mg twice daily  IV Lopressor for sustained heart rates greater than 120  Monitor on telemetry overnight

## 2024-07-29 NOTE — PROGRESS NOTES
Patient:    MRN:  [id number]    Taj Request ID:  [id number]    Level of care reserved:  Skilled Nursing Facility    Partner Reserved:  [facility], [place] [address] [phone]    Clinical needs requested:    Geography searched:  10 miles around [id number]    Start of Service:    Request sent:  8:09am EDT on 7/29/2024 by Reny Cook    Partner reserved:  9:14am EDT on 7/29/2024 by Reny Cook    Choice list shared:

## 2024-07-29 NOTE — ASSESSMENT & PLAN NOTE
89-year-old female presented with altered mental status, falls  Presented from nursing home, history of dementia with confusion at baseline.  History of multiple falls in the past  Remains confused  Suspect secondary to UTI  Appreciate geriatrics recommendations  Delirium precautions  On virtual one-to-one due to impulsiveness  PT/OT-likely return to facility when mental status improves

## 2024-07-29 NOTE — TREATMENT PLAN
Nursing reached out to medical team that patient has become increasingly agitated and difficult to redirect.  Also noted of ecchymosis on the left hip.  Noting that she did not have any imaging of her pelvis on admission.  Additionally noted that heart rate between 130 and 150, she is 86 to 88% on room air. Placed on 1l.     Plan:  Suspect agitation related to hospital induced delirium in setting of underlying UTI.  Pain may be contributing and with bruise will rule out fractures with x-ray.  Suspect A-fib will improve with treatment of underlying UTI and agitation.  Nursing reported that approximately 45 minutes after Zyprexa patient is still tachycardic.  Give one-time dose of IV Lopressor.  It is possible that patient is volume overloaded with rapid A-fib and resuscitation on admission.  Will check chest x-ray and give one-time dose of diuretics if it appears congested.    CXR with vascular congestion and b/l pleural effusions   On review of recent ED visit for fall ct scan showed pleural effusions at that time.   Will give lasix iv 40 mg and follow response

## 2024-07-29 NOTE — PROGRESS NOTES
Pt with increased agitation as the evening goes on. HR continues to rise on telemetry with AFib RVR ranging 130-150s. Pt is s/p IV lopressor and increased dose of toprol XL. IM zyprexa given for agitation. Pt RR noted to be increased into mid to high 20s and oxygen sats are high 80s on RA. Pt sounds wheezy when exerting self and with her ongoing agitation. Pt also with protruding hematoma on L hip previously documented from fall on 7/14/2024. SLIM made aware of same. Orders obtained for additional dose of IV lopressor to be given at this time, STAT CXR and B/L hip/pelvis XR. Pt remains on 1:1 for safety.

## 2024-07-29 NOTE — CONSULTS
Consultation - Geriatrics   Adrianna TOPHER Macias 89 y.o. female MRN: [id number]  Unit/Bed#: E2 -01 Encounter: [id number]      Assessment/Plan    Acute Metabolic Encephalopathy  Presented to the hospital for increased falls and altered mental status   Baseline mentation per the facility is alert and oriented to person vs person and place (they do note that she does tend to wax and wane throughout the day)   Current cause considerations   Suspected to be multifactorial secondary to dementia, suspected UTI, frequent falls, antibiotic use, lorazepam use, anemia, sleep disturbances, and hospitalization   UA on admission positive for large blood, large leukocytes, innumerable RBC and WBC, and moderate bacteria  Urine culture currently pending  No leukocytosis noted on labs today  Hemoglobin noted to be 7.8 on labs today  Patient is alert and oriented to person and place on my exam  Nursing notes that she is very impulsive   Facility notes that this is her baseline   She does not appear to be agitated today  She is currently on 1:1 supervision for safety   Identify and treat reversible causes of confusion including infection, dehydration, electrolyte imbalance, anemia, hypoxia, urinary retention, constipation, pain, and sleep disturbance  Maintain delirium precautions   Provide redirection, reorientation, and distraction techniques  Maintain fall and safety precautions   Assist with ADLs/IADLs  Avoid deliriogenic medications such as tramadol, benzodiazepines, anticholinergics, benadryl  Treat pain using geriatric pain protocol   Encourage oral hydration and nutrition   Monitor for constipation and urinary retention   Implement sleep hygiene and limit night time interuptions   Maintain sleep-wake cycle   Encourage early and frequent mobilization   Most recent EKG on 7/28/2024 revealed a QTc interval of 409  May want to avoid antipsychotics for acute agitation and behaviors as these medications lower the seizure threshold  and patient does have a seizure history   If all other interventions are unsuccessful for acute agitation and behaviors, can consider Depakote 125 mg once (may use IV Depacon if patient is not taking oral medication)   Would monitor on this medication as Depakote can increase effects of Tegretol   Would avoid benzodiazepines such as Ativan as these medications are linked to cognitive impairment, falls, sedation, psychomotor impairment, and delirium   Redirect and reorient as needed  Keep mentally, physically, and socially active    Vascular Dementia without Behavioral Disturbance, Psychotic Disturbance, and Mood Disturbance   Patient has a known documented history   Suspect she has vascular dementia based on prior history   Baseline mentation per facility staff is oriented to person vs person and place (mentation waxes and wanes at baseline)  Facility notes that she is impulsive and she does have occasional behaviors   Patient is alert and oriented to person and place on my exam today   She was in restraints this morning but these have been discontinued   She is on 1:1 supervision for safety   Most recent TSH on labs today noted to be 1.398  Most recent vitamin B 12 level on labs today noted to be 188  Recommend supplementing with a goal vitamin B 12 level > 400   CT of the head on 7/28/2024 revealed moderate chronic microvascular ischemic changes   Patient scored 19/30 on MoCA on 6/20/2019  Maintain delirium precautions as discussed above  Redirect and reorient as needed  Keep physically, mentally, and socially active     Deconditioning   Baseline function: needs assistance with ADLs and is dependent with IADLs  Patient is at increased risk for deconditioning secondary to dementia, suspected UTI, frequent falls, antibiotic use, lorazepam use, anemia, weakness, gait dysfunction, and hospitalization  Continue to optimize diet, hydration, and mobility for healing   GFR 48 on labs today   Patient has no documented  history of CKD   Baseline creatinine unclear   Avoid nephrotoxic medication and renal dose medication   Keep hydrated   Type II Diabetes   Most recent hemoglobin A1C on 9/26/2022 noted to be 6.7   Unclear if patient is on any medication for this at baseline (awaiting medication list from facility)  She is not currently on blood sugar checks  Glucose on labs this morning noted to be 149  Consider glucose checks   Consider diabetic diet   Avoid hypoglycemia   Anemia   Baseline hemoglobin is unclear   Hemoglobin on labs today noted to be 7.8  Patient has had a few recent falls and is noted to have multiple facial ecchymosis and scalp hematoma   No signs of active bleeding noted on exam today  Continue to monitor CBC   Transfuse for hemoglobin < 7   Monitor for signs and symptoms of infection, dehydration, DVT, and skin breakdown    Frailty   Clinical Frail Scale: 6- Moderately Frail  Need help with all outside activities  Need help with stairs and bathing  May need assistance with dressing  Most recent albumin on 7/9/2024 noted to be 3.7  Consider nutrition consult  Encourage protein supplementation     Ambulatory Dysfunction/Falls  Facility notes that the patient has had two recent falls   She has been ambulating with a roller walker at baseline   PT/OT consulted to assist with strengthening/mobility and assist with discharge planning to appropriate level of care  Assess patient frequently for physical needs, encourage use of assistant devices as needed and directed by PT/OT  Identify cognitive and physical deficits and behaviors that affect risk of falls  Consider moving patient closer to nursing station to monitor more closely for impulsive behavior which may increase risk of falls  [place] fall and safety precautions   Educate patient/family on patient safety including physical limitations and importance of using call bell for assistance   Modify environment to reduce risk of injury including disconnecting from  pole when not in use, ensuring adequate lighting in room and restroom, ensuring that path to restroom is clear and free of trip hazards  Out of bed as tolerated    Impaired Vision   Patient has no vision impairment per facility staff  She does not wear eyeglasses   Recommend use of corrective lenses at all appropriate times  Encourage adequate lighting and encourage use of assistance with ambulation  Keep personal belongings close to avoid reaching  Encourage appropriate footwear at all times  Recommend large font for printed materials provided to patient    Impaired Hearing   Patient has no hearing impairment per facility staff  She does not wear hearing aids   Hearing impairment strongly correlated with depression, cognitive impairment, delirium and falls in the older adult  Use hearing aids or sound amplifier  Speak face to face  Use clear dictation and enunciation of words    Dentition/Appetite   Facility is unsure if patient wears dentures   She does have a good appetite at baseline   No meal completions documented since admission   Ensure meal consistency is appropriate for all abilities   Consider nutrition consult   Continue aspiration precautions     Elimination   Facility notes that the patient was primarily continent of bowel and bladder up until recently   She would ambulate herself independently to the bathroom when she had to go  Recently the patient has been incontinent   Patient has been incontinent of bowel and bladder while inpatient   Last documented bowel movement was this morning   Documented as a smear   Patient is currently on ferrous sulfate which increases the risk of constipation   She is not currently on a bowel regimen   Monitor for constipation and urinary retention     Insomnia   Facility notes that recently the patient has been having difficulty sleeping   They note that she will sleep for 20 minutes then will get up and ambulate the halls   Unclear if she is on any medication for  sleep at baseline   Awaiting updated medication list from the facility   Nursing notes that the patient did not sleep well last night   Patient is currently on lorazepam at bedtime   PDMP reviewed and it appears the patient has been on this since at least June 2023   Would consider titrating off this medication in the outpatient setting as benzodiazepines are linked to cognitive impairment, falls, sedation, psychomotor impairment, and delirium   Can consider starting melatonin 3 mg daily at bedtime   First line is behavioral therapy   Avoid sedative hypnotics including benzodiazepines and benadryl  Encourage staying awake during the day   Encourage daytime activities and morning exercise   Decrease or eliminate daytime naps   Avoid caffeine especially during late afternoon and evening hours  Establish a nighttime routine  Implement sleep hygiene and limit nighttime interruptions    Anxiety/Depression  Patient has a documented history of anxiety and depression   She appears to be on lorazepam 0.5 mg daily at bedtime   Would consider titrating off this medication in the outpatient setting as benzodiazepines are linked to cognitive impairment, falls, sedation, psychomotor impairment, and delirium   Mood appears stable on exam today   Continue supportive care     Urinary Tract Infection   Patient presented to the hospital with altered mental status   UA on admission positive for large blood, large leukocytes, innumerable RBC and WBC, and moderate bacteria  Urine culture currently pending  Patient is currently on IV Ceftriaxone   Follow-up on cultures   Monitor for urinary retention   Management per primary team     History of CVA  Patient with a history of embolic CVA   She is on aspirin and statin at baseline   Per chart review, it is unclear if she is on Eliquis (appears this was last dispensed on 12/26/2023)  Attempting to confirm with the facility (awaiting medication list to be faxed over)  Eliquis on hold for  now  If patient is on this may need to consider risk vs benefit secondary to increased falls and significant facial bruising status post fall   Management per primary team     Home Safety  Patient resides at [facility]  She is dependent with IADLs and needs assistance with ADLs    Advanced Care Planning  Level 3 DNR    Home Medication Review   Contacted facility and awaiting updated medication list       History of Present Illness   Physician Requesting Consult: Sung Lainez DO  Reason for Consult / Principal Problem: Altered mental status  Hx and PE limited by: Dementia/Altered mental status    HPI: Adrianna Macias is a 89 y.o. year old female who has hypertension, atrial flutter, diabetes, history of CVA, history of seizure, anxiety, depression, insomnia, and vascular dementia and she presented to the hospital with altered mental status and increased falls. The facility also noted increased urinary frequency and worsening anxiety. She was noted to be in atrial flutter with HR in the 100s-130s. A UA on admission was positive for large blood, large leukocytes, innumerable RBC and WBC, and moderate bacteria. Urine culture is still pending. She was started on IV Ceftriaxone. Geriatrics is being consulted for altered mental status.     Care was coordinated with Tracey at [facility]. She notes that the patient needs assistance with bathing and dressing. She states that the patients mentation waxes and wanes between being oriented only to person vs oriented to person and place. She notes the patient is impulsive at baseline and she does have some physical agitation at times. She notes the patient has had two recent falls. She states that the bruising on the patients face is from both falls as the patient did have eye edema which improved and then worsened after her most recent fall. She states the patient ambulates with a roller walker at baseline. She notes no vision or hearing impairments. She is unsure if  the patient wears dentures. She admits that the patient has a good appetite at baseline. She notes that the patient had been taking herself to the bathroom up until recently. She states more recently the patient has been incontinent. She also notes that the patient has difficulty sleeping at baseline. Recently she has been sleeping for 20 minutes at a time and then she will get up.     The patient was seen and evaluated today at the bedside for geriatric consult. She is noted to be sitting up in the bedside chair in no acute distress. She is oriented to person and place. She is currently denying pain.     Care was coordinated with patients nurse Law. She notes the patient was restrained this morning but restraints have since been discontinued. She is now on 1:1 supervision for safety. She notes no other acute issues or events overnight.     Care was also coordinated with Dr. Lainez with internal medicine.     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    GERD (gastroesophageal reflux disease)     Hypertension     Migraine without aura     Recurrent UTI (urinary tract infection)     Vitamin D deficiency     measurable     Past Surgical History:   Procedure Laterality Date    CHOLECYSTECTOMY      TOTAL ABDOMINAL HYSTERECTOMY W/ BILATERAL SALPINGOOPHORECTOMY       Social History   Social History     Substance and Sexual Activity   Alcohol Use Yes     Social History     Substance and Sexual Activity   Drug Use Not on file     Social History     Tobacco Use   Smoking Status Never   Smokeless Tobacco Never     Family History:   Family History   Problem Relation Age of Onset    Alzheimer's disease Mother 80  "       mid       Meds/Allergies   Current meds:   Current Facility-Administered Medications   Medication Dose Route Frequency    acetaminophen (TYLENOL) tablet 650 mg  650 mg Oral Q6H PRN    aluminum-magnesium hydroxide-simethicone (MAALOX) oral suspension 30 mL  30 mL Oral Q6H PRN    amLODIPine (NORVASC) tablet 5 mg  5 mg Oral Daily    apixaban (ELIQUIS) tablet 5 mg  5 mg Oral BID    aspirin (ECOTRIN LOW STRENGTH) EC tablet 81 mg  81 mg Oral Daily    atorvastatin (LIPITOR) tablet 80 mg  80 mg Oral Daily With Dinner    bisacodyl (DULCOLAX) rectal suppository 10 mg  10 mg Rectal Daily PRN    busPIRone (BUSPAR) tablet 2.5 mg  2.5 mg Oral BID    carBAMazepine (TEGretol) tablet 200 mg  200 mg Oral BID    ceftriaxone (ROCEPHIN) 1 g/50 mL in dextrose IVPB  1,000 mg Intravenous Q24H    lisinopril (ZESTRIL) tablet 20 mg  20 mg Oral Daily    LORazepam (ATIVAN) tablet 0.5 mg  0.5 mg Oral HS    metoprolol succinate (TOPROL-XL) 24 hr tablet 50 mg  50 mg Oral Daily    ondansetron (ZOFRAN) injection 4 mg  4 mg Intravenous Q6H PRN    pantoprazole (PROTONIX) EC tablet 20 mg  20 mg Oral Daily Before Breakfast    sertraline (ZOLOFT) tablet 25 mg  25 mg Oral Daily        Allergies   Allergen Reactions    Citalopram      Other reaction(s): Other (See Comments)  insomnia    Dextrose 5%-Electrolyte #48     Levofloxacin      Other reaction(s): Other (See Comments)  \"feels lousey\"    Morphine Other (See Comments)     unknown    Nitrofurantoin Other (See Comments)     diarrhea    Oxycodone Other (See Comments)     unknown    Paroxetine Other (See Comments)     nausea    Pseudoephedrine Other (See Comments)     insomnia       Objective   Vitals: Blood pressure 159/98, pulse (!) 123, temperature (!) 97 °F (36.1 °C), temperature source Temporal, resp. rate 22, height 5' 2\" (1.575 m), weight 70.4 kg (155 lb 3.3 oz), SpO2 93%.,Body mass index is 28.39 kg/m².      Physical Exam  Vitals and nursing note reviewed.   Constitutional:       General: " "She is not in acute distress.     Appearance: She is not ill-appearing.   HENT:      Head: Normocephalic.      Mouth/Throat:      Mouth: Mucous membranes are moist.   Eyes:      General: No scleral icterus.     Conjunctiva/sclera: Conjunctivae normal.      Comments: L eyelid edema   Cardiovascular:      Rate and Rhythm: Tachycardia present. Rhythm irregular.   Pulmonary:      Effort: Pulmonary effort is normal. No respiratory distress.   Abdominal:      General: Bowel sounds are normal. There is no distension.      Palpations: Abdomen is soft.      Tenderness: There is no abdominal tenderness.   Musculoskeletal:         General: No swelling or tenderness.   Skin:     General: Skin is warm and dry.      Findings: Bruising (facial) present.   Neurological:      Mental Status: She is alert. Mental status is at baseline. She is disoriented.      Motor: Weakness present.      Gait: Gait abnormal.      Comments: Oriented to person and place  Disoriented to time and situation   Hx of dementia         Lab Results:   Results from last 7 days   Lab Units 07/29/24  0554   WBC Thousand/uL 8.99   HEMOGLOBIN g/dL 7.8*   HEMATOCRIT % 26.7*   PLATELETS Thousands/uL 262        Results from last 7 days   Lab Units 07/29/24  0554   POTASSIUM mmol/L 4.1   CHLORIDE mmol/L 108   CO2 mmol/L 23   BUN mg/dL 39*   CREATININE mg/dL 1.03   CALCIUM mg/dL 8.1*       Imaging Studies: I have personally reviewed pertinent reports.    EKG, Pathology, and Other Studies: I have personally reviewed pertinent reports.    VTE Prophylaxis: Sequential compression device (Venodyne)     Code Status: Level 3 - DNAR and DNI      Please note:  Voice-recognition software may have been used in the preparation of this document.  Occasional wrong word or \"sound-alike\" substitutions may have occurred due to the inherent limitations of voice recognition software.  Interpretation should be guided by context.    "

## 2024-07-29 NOTE — PLAN OF CARE
Problem: Potential for Falls  Goal: Patient will remain free of falls  Description: INTERVENTIONS:  - Educate patient/family on patient safety including physical limitations  - Instruct patient to call for assistance with activity   - Consult OT/PT to assist with strengthening/mobility   - Keep Call bell within reach  - Keep bed low and locked with side rails adjusted as appropriate  - Keep care items and personal belongings within reach  - Initiate and maintain comfort rounds  - Make Fall Risk Sign visible to staff  - Offer Toileting every 2 Hours, in advance of need  - Initiate/Maintain bed alarm  - Obtain necessary fall risk management equipment: yellow socks, yellow bracelet, bed alarm  - Apply yellow socks and bracelet for high fall risk patients  - Consider moving patient to room near nurses station  Outcome: Progressing     Problem: Prexisting or High Potential for Compromised Skin Integrity  Goal: Skin integrity is maintained or improved  Description: INTERVENTIONS:  - Identify patients at risk for skin breakdown  - Assess and monitor skin integrity  - Assess and monitor nutrition and hydration status  - Monitor labs   - Assess for incontinence   - Turn and reposition patient  - Assist with mobility/ambulation  - Relieve pressure over bony prominences  - Avoid friction and shearing  - Provide appropriate hygiene as needed including keeping skin clean and dry  - Evaluate need for skin moisturizer/barrier cream  - Collaborate with interdisciplinary team   - Patient/family teaching  - Consider wound care consult   Outcome: Progressing     Problem: PAIN - ADULT  Goal: Verbalizes/displays adequate comfort level or baseline comfort level  Description: Interventions:  - Encourage patient to monitor pain and request assistance  - Assess pain using appropriate pain scale  - Administer analgesics based on type and severity of pain and evaluate response  - Implement non-pharmacological measures as appropriate and  evaluate response  - Consider cultural and social influences on pain and pain management  - Notify physician/advanced practitioner if interventions unsuccessful or patient reports new pain  Outcome: Progressing     Problem: INFECTION - ADULT  Goal: Absence or prevention of progression during hospitalization  Description: INTERVENTIONS:  - Assess and monitor for signs and symptoms of infection  - Monitor lab/diagnostic results  - Monitor all insertion sites, i.e. indwelling lines, tubes, and drains  - Monitor endotracheal if appropriate and nasal secretions for changes in amount and color  - [place] appropriate cooling/warming therapies per order  - Administer medications as ordered  - Instruct and encourage patient and family to use good hand hygiene technique  - Identify and instruct in appropriate isolation precautions for identified infection/condition  Outcome: Progressing     Problem: SAFETY ADULT  Goal: Patient will remain free of falls  Description: INTERVENTIONS:  - Educate patient/family on patient safety including physical limitations  - Instruct patient to call for assistance with activity   - Consult OT/PT to assist with strengthening/mobility   - Keep Call bell within reach  - Keep bed low and locked with side rails adjusted as appropriate  - Keep care items and personal belongings within reach  - Initiate and maintain comfort rounds  - Make Fall Risk Sign visible to staff  - Offer Toileting every 2 Hours, in advance of need  - Initiate/Maintain bed alarm  - Obtain necessary fall risk management equipment: yellow socks, yellow bracelet, bed alarm  - Apply yellow socks and bracelet for high fall risk patients  - Consider moving patient to room near nurses station  Outcome: Progressing     Problem: DISCHARGE PLANNING  Goal: Discharge to home or other facility with appropriate resources  Description: INTERVENTIONS:  - Identify barriers to discharge w/patient and caregiver  - Arrange for needed discharge  resources and transportation as appropriate  - Identify discharge learning needs (meds, wound care, etc.)  - Arrange for interpretive services to assist at discharge as needed  - Refer to Case Management Department for coordinating discharge planning if the patient needs post-hospital services based on physician/advanced practitioner order or complex needs related to functional status, cognitive ability, or social support system  Outcome: Progressing     Problem: Knowledge Deficit  Goal: Patient/family/caregiver demonstrates understanding of disease process, treatment plan, medications, and discharge instructions  Description: Complete learning assessment and assess knowledge base.  Interventions:  - Provide teaching at level of understanding  - Provide teaching via preferred learning methods  Outcome: Progressing     Problem: GENITOURINARY - ADULT  Goal: Maintains or returns to baseline urinary function  Description: INTERVENTIONS:  - Assess urinary function  - Encourage oral fluids to ensure adequate hydration if ordered  - Administer IV fluids as ordered to ensure adequate hydration  - Administer ordered medications as needed  - Offer frequent toileting  - Follow urinary retention protocol if ordered  Outcome: Progressing     Problem: SKIN/TISSUE INTEGRITY - ADULT  Goal: Skin Integrity remains intact(Skin Breakdown Prevention)  Description: Assess:  -Perform Willy assessment every shift  -Clean and moisturize skin  -Inspect skin when repositioning, toileting, and assisting with ADLS  -Assess under medical devices   -Assess extremities for adequate circulation and sensation     Bed Management:  -Have minimal linens on bed & keep smooth, unwrinkled  -Change linens as needed when moist or perspiring  -Avoid sitting or lying in one position for more than 2 hours while in bed  -Keep HOB at 30 degrees     Toileting:  -Offer bedside commode  -Assess for incontinence every 2 hours  -Use incontinent care products after  each incontinent episode    Activity:  -Mobilize patient 3 times a day  -Encourage activity and walks on unit  -Encourage or provide ROM exercises   -Turn and reposition patient every 2 Hours  -Use appropriate equipment to lift or move patient in bed  -Instruct/ Assist with weight shifting when out of bed in chair  -Consider limitation of chair time 1/2 hour intervals    Skin Care:  -Avoid use of baby powder, tape, friction and shearing, hot water or constrictive clothing  -Relieve pressure over bony prominences using allevyn  -Do not massage red bony areas    Next Steps:  -Teach patient strategies to minimize risks such as falls   -Consider consults to  interdisciplinary teams such as PT, OT  Outcome: Progressing     Problem: MUSCULOSKELETAL - ADULT  Goal: Maintain or return mobility to safest level of function  Description: INTERVENTIONS:  - Assess patient's ability to carry out ADLs; assess patient's baseline for ADL function and identify physical deficits which impact ability to perform ADLs (bathing, care of mouth/teeth, toileting, grooming, dressing, etc.)  - Assess/evaluate cause of self-care deficits   - Assess range of motion  - Assess patient's mobility  - Assess patient's need for assistive devices and provide as appropriate  - Encourage maximum independence but intervene and supervise when necessary  - Involve family in performance of ADLs  - Assess for home care needs following discharge   - Consider OT consult to assist with ADL evaluation and planning for discharge  - Provide patient education as appropriate  Outcome: Progressing

## 2024-07-29 NOTE — ASSESSMENT & PLAN NOTE
History of embolic CVA, continue aspirin, statin, Eliquis  Given frequent falls, may need to weigh risk benefits of continuing Eliquis.  Follow-up with PCP.

## 2024-07-29 NOTE — ASSESSMENT & PLAN NOTE
UA concerning for infection, reporting urinary frequency and incontinence  Continue ceftriaxone  Cultures pending

## 2024-07-29 NOTE — PROGRESS NOTES
[facility]  Progress Note  Name: Adrianna Macias I  MRN: [id number]  Unit/Bed#: E2 -01 I Date of Admission: 7/28/2024   Date of Service: 7/29/2024 I Hospital Day: 1    Assessment & Plan   * Metabolic encephalopathy  Assessment & Plan  89-year-old female presented with altered mental status, falls  Presented from nursing home, history of dementia with confusion at baseline.  History of multiple falls in the past  Remains confused  Suspect secondary to UTI  Appreciate geriatrics recommendations  Delirium precautions  On virtual one-to-one due to impulsiveness  PT/OT-likely return to facility when mental status improves    Atrial flutter (HCC)  Assessment & Plan  Patient presents with atrial flutter with RVR.  Suspect secondary to infection  Heart rates between 100s and 130s this morning  Will increase metoprolol to 50 mg twice daily  IV Lopressor for sustained heart rates greater than 120    Anemia  Assessment & Plan  Hemoglobin 7.8 on admission, previous baseline appears to be 9-10  Recent fall with multiple facial ecchymosis, scalp hematoma  Remained stable, no signs of active bleeding  B12, folic acid within normal limits  Ferritin low end of normal    UTI (urinary tract infection)  Assessment & Plan  UA concerning for infection, reporting urinary frequency and incontinence  Continue ceftriaxone  Cultures pending    Hypertension  Assessment & Plan  Continue metoprolol, amlodipine, lisinopril    History of CVA (cerebrovascular accident)  Assessment & Plan  History of embolic CVA, continue aspirin, statin  Unclear if patient on eliquis. Will attempt to confirm with facility. Pharmacy reached out as there is a CI interaction with tegretol and eliquis. Hold for now.   Given frequent falls, may need to weigh risk benefits of continuing Eliquis.  Follow-up with PCP.    History of seizure  Assessment & Plan  Continue Tegretol 200 mg twice daily         VTE Pharmacologic Prophylaxis:  eliquis     Patient Centered Rounds:  Patient care rounds were performed with nursing    Education and Discussions with Family / Patient: Called Lb approx 145, no answer    Time Spent for Care: I have spent a total time of 47 minutes on 24 in caring for this patient including Diagnostic results, Risks and benefits of tx options, Instructions for management, Importance of tx compliance, Impressions, Documenting in the medical record, Reviewing / ordering tests, medicine, procedures  , Obtaining or reviewing history  , and Communicating with other healthcare professionals .      Current Length of Stay: 1 day(s)    Current Patient Status: Inpatient   Certification Statement: The patient will continue to require additional inpatient hospital stay due to need for IV medications, management of UTI and encephalopathy     Discharge Plan: 48 hours return to facility     Code Status: Level 3 - DNAR and DNI      Subjective:   Patient seen and evaluated at bedside. Remains confused. Started virtual one to one observation     Objective:     Vitals:   Temp (24hrs), Av.3 °F (36.3 °C), Min:97 °F (36.1 °C), Max:97.5 °F (36.4 °C)    Temp:  [97 °F (36.1 °C)-97.5 °F (36.4 °C)] 97 °F (36.1 °C)  HR:  [108-123] 123  Resp:  [18-22] 22  BP: (131-159)/(59-98) 159/98  SpO2:  [93 %-98 %] 93 %  Body mass index is 28.39 kg/m².     Input and Output Summary (last 24 hours):       Intake/Output Summary (Last 24 hours) at 2024 1346  Last data filed at 2024 0601  Gross per 24 hour   Intake 1055 ml   Output --   Net 1055 ml       Physical Exam:     Physical Exam  Vitals reviewed.   Constitutional:       General: She is not in acute distress.     Appearance: She is well-developed. She is not toxic-appearing or diaphoretic.   HENT:      Head:      Comments: Ecchymosis     Mouth/Throat:      Mouth: Mucous membranes are moist.   Eyes:      General: No scleral icterus.     Extraocular Movements: Extraocular movements intact.    Cardiovascular:      Rate and Rhythm: Tachycardia present. Rhythm irregular.      Heart sounds: Normal heart sounds.   Pulmonary:      Effort: Pulmonary effort is normal. No respiratory distress.      Breath sounds: Normal breath sounds. No wheezing or rales.   Abdominal:      General: There is no distension.      Palpations: Abdomen is soft.      Tenderness: There is no abdominal tenderness. There is no guarding or rebound.   Musculoskeletal:         General: No swelling, tenderness or deformity.   Skin:     General: Skin is warm and dry.   Neurological:      Mental Status: She is alert.      Comments: Alert and oriented x 1, follows commands, responses at times unintelligible   Psychiatric:      Comments: Calm         Additional Data:     Labs: I have reviewed pertinent results     Results from last 7 days   Lab Units 07/29/24  0554   WBC Thousand/uL 8.99   HEMOGLOBIN g/dL 7.8*   HEMATOCRIT % 26.7*   PLATELETS Thousands/uL 262   SEGS PCT % 76*   LYMPHO PCT % 13*   MONO PCT % 11   EOS PCT % 0     Results from last 7 days   Lab Units 07/29/24  0554   SODIUM mmol/L 138   POTASSIUM mmol/L 4.1   CHLORIDE mmol/L 108   CO2 mmol/L 23   BUN mg/dL 39*   CREATININE mg/dL 1.03   ANION GAP mmol/L 7   CALCIUM mg/dL 8.1*   GLUCOSE RANDOM mg/dL 149*                         Imaging: I have reviewed pertinent imaging       Recent Cultures (last 7 days):           Last 24 Hours Medication List:   Current Facility-Administered Medications   Medication Dose Route Frequency Provider Last Rate    acetaminophen  650 mg Oral Q6H PRN Mika Jackson MD      aluminum-magnesium hydroxide-simethicone  30 mL Oral Q6H PRN Mika Jackson MD      amLODIPine  5 mg Oral Daily Mika Jackson MD      apixaban  5 mg Oral BID Mika Jackson MD      aspirin  81 mg Oral Daily Mika Jackson MD      atorvastatin  80 mg Oral Daily With Dinner Mika Jackson MD      bisacodyl  10 mg Rectal Daily PRN Mika Jackson MD      busPIRone  2.5 mg Oral BID  Mika Jackson MD      carBAMazepine  200 mg Oral BID Mika Jackson MD      cefTRIAXone  1,000 mg Intravenous Q24H Mika Jackson MD      lisinopril  20 mg Oral Daily Mika Jackson MD      LORazepam  0.5 mg Oral HS Mika Jackson MD      metoprolol  5 mg Intravenous Q6H PRN Sung Lainez DO      metoprolol succinate  50 mg Oral BID Sung Lainez DO      ondansetron  4 mg Intravenous Q6H PRN Mika Jackson MD      pantoprazole  20 mg Oral Daily Before Breakfast Miak Jackson MD      sertraline  25 mg Oral Daily Mika Jackson MD          Today, Patient Was Seen By: Sung Lainez DO    ** Please Note: Dictation voice to text software may have been used in the creation of this document. **

## 2024-07-30 ENCOUNTER — APPOINTMENT (INPATIENT)
Dept: NON INVASIVE DIAGNOSTICS | Facility: HOSPITAL | Age: 89
DRG: 070 | End: 2024-07-30
Payer: MEDICARE
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
## 2024-07-30 PROBLEM — I48.91 ATRIAL FIBRILLATION (HCC): Status: ACTIVE | Noted: 2024-07-29

## 2024-07-30 PROBLEM — I50.31 ACUTE DIASTOLIC HEART FAILURE (HCC): Status: ACTIVE | Noted: 2024-07-30

## 2024-07-30 LAB
ANION GAP SERPL CALCULATED.3IONS-SCNC: 9 MMOL/L (ref 4–13)
AORTIC ROOT: 3.3 CM
AORTIC VALVE MEAN VELOCITY: 14.4 M/S
APICAL FOUR CHAMBER EJECTION FRACTION: 67 %
AV AREA BY CONTINUOUS VTI: 1.2 CM2
AV AREA PEAK VELOCITY: 1.4 CM2
AV LVOT MEAN GRADIENT: 2 MMHG
AV LVOT PEAK GRADIENT: 3 MMHG
AV MEAN GRADIENT: 9 MMHG
AV PEAK GRADIENT: 16 MMHG
AV VALVE AREA: 1.17 CM2
AV VELOCITY RATIO: 0.44
BACTERIA UR CULT: ABNORMAL
BSA FOR ECHO PROCEDURE: 1.72 M2
BUN SERPL-MCNC: 37 MG/DL (ref 5–25)
CALCIUM SERPL-MCNC: 8 MG/DL (ref 8.4–10.2)
CHLORIDE SERPL-SCNC: 107 MMOL/L (ref 96–108)
CO2 SERPL-SCNC: 24 MMOL/L (ref 21–32)
CREAT SERPL-MCNC: 1.07 MG/DL (ref 0.6–1.3)
DOP CALC AO PEAK VEL: 2 M/S
DOP CALC AO VTI: 40.44 CM
DOP CALC LVOT AREA: 3.14 CM2
DOP CALC LVOT CARDIAC INDEX: 3.21 L/MIN/M2
DOP CALC LVOT CARDIAC OUTPUT: 5.52 L/MIN
DOP CALC LVOT DIAMETER: 2 CM
DOP CALC LVOT PEAK VEL VTI: 15.12 CM
DOP CALC LVOT PEAK VEL: 0.87 M/S
DOP CALC LVOT STROKE INDEX: 26.7 ML/M2
DOP CALC LVOT STROKE VOLUME: 47.48
DOP CALC MV VTI: 29.8 CM
ERYTHROCYTE [DISTWIDTH] IN BLOOD BY AUTOMATED COUNT: 18.1 % (ref 11.6–15.1)
FRACTIONAL SHORTENING: 29 (ref 28–44)
GFR SERPL CREATININE-BSD FRML MDRD: 46 ML/MIN/1.73SQ M
GLUCOSE SERPL-MCNC: 121 MG/DL (ref 65–140)
GLUCOSE SERPL-MCNC: 148 MG/DL (ref 65–140)
HCT VFR BLD AUTO: 27.5 % (ref 34.8–46.1)
HGB BLD-MCNC: 7.9 G/DL (ref 11.5–15.4)
INTERVENTRICULAR SEPTUM IN DIASTOLE (PARASTERNAL SHORT AXIS VIEW): 1.5 CM
INTERVENTRICULAR SEPTUM: 1.5 CM (ref 0.6–1.1)
LAAS-AP2: 30 CM2
LAAS-AP4: 27.8 CM2
LEFT ATRIUM AREA SYSTOLE SINGLE PLANE A4C: 28.5 CM2
LEFT ATRIUM SIZE: 5.2 CM
LEFT ATRIUM VOLUME (MOD BIPLANE): 121 ML
LEFT ATRIUM VOLUME INDEX (MOD BIPLANE): 70.3 ML/M2
LEFT INTERNAL DIMENSION IN SYSTOLE: 2.5 CM (ref 2.1–4)
LEFT VENTRICULAR INTERNAL DIMENSION IN DIASTOLE: 3.5 CM (ref 3.5–6)
LEFT VENTRICULAR POSTERIOR WALL IN END DIASTOLE: 1.6 CM
LEFT VENTRICULAR STROKE VOLUME: 29 ML
LVSV (TEICH): 29 ML
MAGNESIUM SERPL-MCNC: 2.3 MG/DL (ref 1.9–2.7)
MCH RBC QN AUTO: 21.5 PG (ref 26.8–34.3)
MCHC RBC AUTO-ENTMCNC: 28.7 G/DL (ref 31.4–37.4)
MCV RBC AUTO: 75 FL (ref 82–98)
MV MEAN GRADIENT: 6 MMHG
MV PEAK GRADIENT: 14 MMHG
MV STENOSIS PRESSURE HALF TIME: 96 MS
MV VALVE AREA BY CONTINUITY EQUATION: 1.59 CM2
MV VALVE AREA P 1/2 METHOD: 2.29
PISA TR MAX VEL: 0.3 M/S
PLATELET # BLD AUTO: 258 THOUSANDS/UL (ref 149–390)
PMV BLD AUTO: 9.9 FL (ref 8.9–12.7)
POTASSIUM SERPL-SCNC: 3.9 MMOL/L (ref 3.5–5.3)
RA PRESSURE ESTIMATED: 8 MMHG
RBC # BLD AUTO: 3.68 MILLION/UL (ref 3.81–5.12)
RIGHT ATRIUM AREA SYSTOLE A4C: 30.4 CM2
RIGHT VENTRICLE ID DIMENSION: 5.4 CM
RV PSP: 56 MMHG
SL CV LEFT ATRIUM LENGTH A2C: 5.7 CM
SL CV LV EF: 70
SL CV PED ECHO LEFT VENTRICLE DIASTOLIC VOLUME (MOD BIPLANE) 2D: 51 ML
SL CV PED ECHO LEFT VENTRICLE SYSTOLIC VOLUME (MOD BIPLANE) 2D: 21 ML
SL CV TV EROA: 0.1 CM2
SL CV TV REGURGITANT FLOW RATE: 39 ML/S
SODIUM SERPL-SCNC: 140 MMOL/L (ref 135–147)
TR MAX PG: 48 MMHG
TR PEAK VELOCITY: 3.5 M/S
TR VTI: 110.9 CM
TRICUSPID ANNULAR PLANE SYSTOLIC EXCURSION: 1.5 CM
TRICUSPID VALVE PEAK REGURGITATION VELOCITY: 3.48 M/S
WBC # BLD AUTO: 10.61 THOUSAND/UL (ref 4.31–10.16)

## 2024-07-30 PROCEDURE — 99232 SBSQ HOSP IP/OBS MODERATE 35: CPT | Performed by: INTERNAL MEDICINE

## 2024-07-30 PROCEDURE — 99223 1ST HOSP IP/OBS HIGH 75: CPT | Performed by: INTERNAL MEDICINE

## 2024-07-30 PROCEDURE — 97163 PT EVAL HIGH COMPLEX 45 MIN: CPT

## 2024-07-30 PROCEDURE — 83735 ASSAY OF MAGNESIUM: CPT | Performed by: INTERNAL MEDICINE

## 2024-07-30 PROCEDURE — 93306 TTE W/DOPPLER COMPLETE: CPT

## 2024-07-30 PROCEDURE — 97167 OT EVAL HIGH COMPLEX 60 MIN: CPT

## 2024-07-30 PROCEDURE — 85027 COMPLETE CBC AUTOMATED: CPT | Performed by: INTERNAL MEDICINE

## 2024-07-30 PROCEDURE — 82948 REAGENT STRIP/BLOOD GLUCOSE: CPT

## 2024-07-30 PROCEDURE — 80048 BASIC METABOLIC PNL TOTAL CA: CPT | Performed by: INTERNAL MEDICINE

## 2024-07-30 PROCEDURE — 99232 SBSQ HOSP IP/OBS MODERATE 35: CPT

## 2024-07-30 RX ORDER — HEPARIN SODIUM 5000 [USP'U]/ML
5000 INJECTION, SOLUTION INTRAVENOUS; SUBCUTANEOUS EVERY 8 HOURS SCHEDULED
Status: DISCONTINUED | OUTPATIENT
Start: 2024-07-30 | End: 2024-08-01 | Stop reason: SDUPTHER

## 2024-07-30 RX ORDER — FUROSEMIDE 10 MG/ML
40 INJECTION INTRAMUSCULAR; INTRAVENOUS
Status: DISCONTINUED | OUTPATIENT
Start: 2024-07-30 | End: 2024-07-31

## 2024-07-30 RX ADMIN — ACETAMINOPHEN 975 MG: 325 TABLET, FILM COATED ORAL at 21:18

## 2024-07-30 RX ADMIN — ACETAMINOPHEN 975 MG: 325 TABLET, FILM COATED ORAL at 13:12

## 2024-07-30 RX ADMIN — LISINOPRIL 20 MG: 20 TABLET ORAL at 11:20

## 2024-07-30 RX ADMIN — ACETAMINOPHEN 975 MG: 325 TABLET, FILM COATED ORAL at 05:10

## 2024-07-30 RX ADMIN — FUROSEMIDE 40 MG: 10 INJECTION, SOLUTION INTRAMUSCULAR; INTRAVENOUS at 16:46

## 2024-07-30 RX ADMIN — DEXTROSE 1000 MG: 50 INJECTION, SOLUTION INTRAVENOUS at 13:12

## 2024-07-30 RX ADMIN — AMLODIPINE BESYLATE 5 MG: 5 TABLET ORAL at 11:19

## 2024-07-30 RX ADMIN — ASPIRIN 81 MG: 81 TABLET, COATED ORAL at 11:19

## 2024-07-30 RX ADMIN — CARBAMAZEPINE 200 MG: 200 TABLET ORAL at 11:19

## 2024-07-30 RX ADMIN — LORAZEPAM 0.5 MG: 0.5 TABLET ORAL at 21:18

## 2024-07-30 RX ADMIN — METOPROLOL SUCCINATE 50 MG: 50 TABLET, EXTENDED RELEASE ORAL at 11:19

## 2024-07-30 RX ADMIN — OLANZAPINE 2.5 MG: 10 INJECTION, POWDER, FOR SOLUTION INTRAMUSCULAR at 11:50

## 2024-07-30 RX ADMIN — BUSPIRONE HYDROCHLORIDE 2.5 MG: 5 TABLET ORAL at 11:19

## 2024-07-30 RX ADMIN — METOPROLOL SUCCINATE 50 MG: 50 TABLET, EXTENDED RELEASE ORAL at 16:46

## 2024-07-30 RX ADMIN — FUROSEMIDE 40 MG: 10 INJECTION, SOLUTION INTRAMUSCULAR; INTRAVENOUS at 10:05

## 2024-07-30 RX ADMIN — PANTOPRAZOLE SODIUM 20 MG: 20 TABLET, DELAYED RELEASE ORAL at 06:18

## 2024-07-30 RX ADMIN — ATORVASTATIN CALCIUM 80 MG: 80 TABLET, FILM COATED ORAL at 16:46

## 2024-07-30 RX ADMIN — SERTRALINE HYDROCHLORIDE 25 MG: 25 TABLET ORAL at 11:19

## 2024-07-30 RX ADMIN — CARBAMAZEPINE 200 MG: 200 TABLET ORAL at 16:46

## 2024-07-30 RX ADMIN — BUSPIRONE HYDROCHLORIDE 2.5 MG: 5 TABLET ORAL at 21:18

## 2024-07-30 RX ADMIN — HEPARIN SODIUM 5000 UNITS: 5000 INJECTION INTRAVENOUS; SUBCUTANEOUS at 21:18

## 2024-07-30 NOTE — ASSESSMENT & PLAN NOTE
Presented with multiple falls   Recent ED visit for fall   PT/OT   CM for assistance with placement on discharge

## 2024-07-30 NOTE — ASSESSMENT & PLAN NOTE
89-year-old female presented with altered mental status, falls  Presented from nursing home, history of dementia with confusion at baseline.  History of multiple falls in the past  Remains confused  Suspect secondary to UTI, hospital delirium   Appreciate geriatrics recommendations  Delirium precautions  Currently requiring one to one and soft restraints due to impulsivity   PT/OT-likely return to facility when mental status improves

## 2024-07-30 NOTE — ASSESSMENT & PLAN NOTE
Hemoglobin 7.8 on admission, previous baseline appears to be 9-10  Recent fall with multiple facial ecchymosis, scalp hematoma  Remained stable, no signs of active bleeding  B12, folic acid within normal limits  Continue iron tablets

## 2024-07-30 NOTE — ASSESSMENT & PLAN NOTE
History of embolic CVA, continue aspirin, statin,  Given frequent falls, may need to weigh risk benefits of continuing Eliquis.  Follow-up with PCP.

## 2024-07-30 NOTE — PLAN OF CARE
Problem: Potential for Falls  Goal: Patient will remain free of falls  Description: INTERVENTIONS:  - Educate patient/family on patient safety including physical limitations  - Instruct patient to call for assistance with activity   - Consult OT/PT to assist with strengthening/mobility   - Keep Call bell within reach  - Keep bed low and locked with side rails adjusted as appropriate  - Keep care items and personal belongings within reach  - Initiate and maintain comfort rounds  - Make Fall Risk Sign visible to staff  - Offer Toileting every 2 Hours, in advance of need  - Initiate/Maintain bed alarm  - Obtain necessary fall risk management equipment: yellow socks, yellow bracelet, bed alarm  - Apply yellow socks and bracelet for high fall risk patients  - Consider moving patient to room near nurses station  Outcome: Progressing     Problem: Prexisting or High Potential for Compromised Skin Integrity  Goal: Skin integrity is maintained or improved  Description: INTERVENTIONS:  - Identify patients at risk for skin breakdown  - Assess and monitor skin integrity  - Assess and monitor nutrition and hydration status  - Monitor labs   - Assess for incontinence   - Turn and reposition patient  - Assist with mobility/ambulation  - Relieve pressure over bony prominences  - Avoid friction and shearing  - Provide appropriate hygiene as needed including keeping skin clean and dry  - Evaluate need for skin moisturizer/barrier cream  - Collaborate with interdisciplinary team   - Patient/family teaching  - Consider wound care consult   Outcome: Progressing     Problem: PAIN - ADULT  Goal: Verbalizes/displays adequate comfort level or baseline comfort level  Description: Interventions:  - Encourage patient to monitor pain and request assistance  - Assess pain using appropriate pain scale  - Administer analgesics based on type and severity of pain and evaluate response  - Implement non-pharmacological measures as appropriate and  evaluate response  - Consider cultural and social influences on pain and pain management  - Notify physician/advanced practitioner if interventions unsuccessful or patient reports new pain  Outcome: Progressing     Problem: INFECTION - ADULT  Goal: Absence or prevention of progression during hospitalization  Description: INTERVENTIONS:  - Assess and monitor for signs and symptoms of infection  - Monitor lab/diagnostic results  - Monitor all insertion sites, i.e. indwelling lines, tubes, and drains  - Monitor endotracheal if appropriate and nasal secretions for changes in amount and color  - [place] appropriate cooling/warming therapies per order  - Administer medications as ordered  - Instruct and encourage patient and family to use good hand hygiene technique  - Identify and instruct in appropriate isolation precautions for identified infection/condition  Outcome: Progressing     Problem: GENITOURINARY - ADULT  Goal: Maintains or returns to baseline urinary function  Description: INTERVENTIONS:  - Assess urinary function  - Encourage oral fluids to ensure adequate hydration if ordered  - Administer IV fluids as ordered to ensure adequate hydration  - Administer ordered medications as needed  - Offer frequent toileting  - Follow urinary retention protocol if ordered  Outcome: Progressing     Problem: MUSCULOSKELETAL - ADULT  Goal: Maintain or return mobility to safest level of function  Description: INTERVENTIONS:  - Assess patient's ability to carry out ADLs; assess patient's baseline for ADL function and identify physical deficits which impact ability to perform ADLs (bathing, care of mouth/teeth, toileting, grooming, dressing, etc.)  - Assess/evaluate cause of self-care deficits   - Assess range of motion  - Assess patient's mobility  - Assess patient's need for assistive devices and provide as appropriate  - Encourage maximum independence but intervene and supervise when necessary  - Involve family in  performance of ADLs  - Assess for home care needs following discharge   - Consider OT consult to assist with ADL evaluation and planning for discharge  - Provide patient education as appropriate  Outcome: Progressing    Problem: NEUROSENSORY - ADULT  Goal: Achieves maximal functionality and self care  Description: INTERVENTIONS  - Monitor swallowing and airway patency with patient fatigue and changes in neurological status  - Encourage and assist patient to increase activity and self care.   - Encourage visually impaired, hearing impaired and aphasic patients to use assistive/communication devices  Outcome: Progressing     Problem: CARDIOVASCULAR - ADULT  Goal: Maintains optimal cardiac output and hemodynamic stability  Description: INTERVENTIONS:  - Monitor I/O, vital signs and rhythm  - Monitor for S/S and trends of decreased cardiac output  - Administer and titrate ordered vasoactive medications to optimize hemodynamic stability  - Assess quality of pulses, skin color and temperature  - Assess for signs of decreased coronary artery perfusion  - Instruct patient to report change in severity of symptoms  Outcome: Progressing     Problem: Nutrition/Hydration-ADULT  Goal: Nutrient/Hydration intake appropriate for improving, restoring or maintaining nutritional needs  Description: Monitor and assess patient's nutrition/hydration status for malnutrition. Collaborate with interdisciplinary team and initiate plan and interventions as ordered.  Monitor patient's weight and dietary intake as ordered or per policy. Utilize nutrition screening tool and intervene as necessary. Determine patient's food preferences and provide high-protein, high-caloric foods as appropriate.     INTERVENTIONS:  - Monitor oral intake, urinary output, labs, and treatment plans  - Assess nutrition and hydration status and recommend course of action  - Evaluate amount of meals eaten  - Assist patient with eating if necessary   - Allow adequate time  for meals  - Recommend/ encourage appropriate diets, oral nutritional supplements, and vitamin/mineral supplements  - Order, calculate, and assess calorie counts as needed  - Recommend, monitor, and adjust tube feedings and TPN/PPN based on assessed needs  - Assess need for intravenous fluids  - Provide specific nutrition/hydration education as appropriate  - Include patient/family/caregiver in decisions related to nutrition  Outcome: Progressing

## 2024-07-30 NOTE — PROGRESS NOTES
[facility]  Progress Note  Name: Adrianna Macias I  MRN: [id number]  Unit/Bed#: E2 -01 I Date of Admission: 7/28/2024   Date of Service: 7/30/2024 I Hospital Day: 2    Assessment & Plan   * Metabolic encephalopathy  Assessment & Plan  89-year-old female presented with altered mental status, falls  Presented from nursing home, history of dementia with confusion at baseline.  History of multiple falls in the past  Remains confused  Suspect secondary to UTI, hospital delirium   Appreciate geriatrics recommendations  Delirium precautions  Currently requiring one to one and soft restraints due to impulsivity   PT/OT-likely return to facility when mental status improves    Acute diastolic heart failure (HCC)  Assessment & Plan  Wt Readings from Last 3 Encounters:   07/30/24 70.3 kg (155 lb)   11/09/22 71.8 kg (158 lb 6.4 oz)   08/11/21 77.7 kg (171 lb 3.2 oz)     Chest x-ray with pleural effusions as well as pulmonary edema  EF 70%, severely dilated bilateral atrium, severe mitral valve regurgitation, severe tricuspid valve regurgitation  Continue IV Lasix 40 mg twice daily  Appreciate cardiology recommendations        Fall  Assessment & Plan  Presented with multiple falls   Recent ED visit for fall   PT/OT   CM for assistance with placement on discharge     Atrial fibrillation (HCC)  Assessment & Plan  Patient presents with atrial flutter with RVR.  Suspect secondary to infection  Rates improved today  Continue increased dose metoprolol succinate 50 mg BID plus prn IV lopressor   Monitor on telemetry overnight    Anemia  Assessment & Plan  Hemoglobin 7.8 on admission, previous baseline appears to be 9-10  Recent fall with multiple facial ecchymosis, scalp hematoma  Remained stable, no signs of active bleeding  B12, folic acid within normal limits  Continue iron tablets     UTI (urinary tract infection)  Assessment & Plan  UA concerning for infection, reporting urinary frequency and  incontinence  Continue ceftriaxone  Cultures pending-polymicrobial Corynebacterium, E. coli    Hypertension  Assessment & Plan  Continue metoprolol, amlodipine, lisinopril    History of CVA (cerebrovascular accident)  Assessment & Plan  History of embolic CVA, continue aspirin, statin,  Given frequent falls, may need to weigh risk benefits of continuing Eliquis.  Follow-up with PCP.    History of seizure  Assessment & Plan  Continue Tegretol 200 mg twice daily         VTE Pharmacologic Prophylaxis: heparin     Patient Centered Rounds:  Patient care rounds were performed with nursing    Education and Discussions with Family / Patient: Called Emergency contact Lb approx [phone] for medical update, no answer. He called back and we spoke approx [phone]    Time Spent for Care: I have spent a total time of 45 minutes on 24 in caring for this patient including Instructions for management, Impressions, Documenting in the medical record, Reviewing / ordering tests, medicine, procedures  , and Communicating with other healthcare professionals .      Current Length of Stay: 2 day(s)    Current Patient Status: Inpatient   Certification Statement: The patient will continue to require additional inpatient hospital stay due to heart failure, A-fib, UTI, encephalopathy      Discharge Plan: 48 to 72 hours    Code Status: Level 3 - DNAR and DNI      Subjective:   Patient seen and evaluated at bedside.  Remains confused, impulsive.    Objective:     Vitals:   Temp (24hrs), Av.1 °F (36.2 °C), Min:94 °F (34.4 °C), Max:98.7 °F (37.1 °C)    Temp:  [94 °F (34.4 °C)-98.7 °F (37.1 °C)] 94 °F (34.4 °C)  HR:  [] 90  Resp:  [20-24] 20  BP: (146-164)/() 147/99  SpO2:  [87 %-94 %] 94 %  Body mass index is 28.35 kg/m².     Input and Output Summary (last 24 hours):     No intake or output data in the 24 hours ending 24 1542    Physical Exam:     Physical Exam  Vitals reviewed.   Constitutional:       General: She is not in  acute distress.     Appearance: She is well-developed. She is not ill-appearing, toxic-appearing or diaphoretic.   HENT:      Head: Normocephalic and atraumatic.      Mouth/Throat:      Mouth: Mucous membranes are moist.   Eyes:      General: No scleral icterus.     Extraocular Movements: Extraocular movements intact.   Cardiovascular:      Rate and Rhythm: Normal rate and regular rhythm.      Heart sounds: Normal heart sounds.   Pulmonary:      Effort: Pulmonary effort is normal. No respiratory distress.      Breath sounds: Normal breath sounds. No wheezing or rales.   Abdominal:      General: There is no distension.      Palpations: Abdomen is soft.      Tenderness: There is no abdominal tenderness. There is no guarding or rebound.   Musculoskeletal:         General: No swelling, tenderness or deformity.   Skin:     General: Skin is warm and dry.   Neurological:      Mental Status: She is alert.      Comments: Alert and oriented x 2, answers simple questions inconsistently.  Short attention span.  Often incoherent responses         Additional Data:     Labs: I have reviewed pertinent results     Results from last 7 days   Lab Units 07/30/24  0512 07/29/24  0554   WBC Thousand/uL 10.61* 8.99   HEMOGLOBIN g/dL 7.9* 7.8*   HEMATOCRIT % 27.5* 26.7*   PLATELETS Thousands/uL 258 262   SEGS PCT %  --  76*   LYMPHO PCT %  --  13*   MONO PCT %  --  11   EOS PCT %  --  0     Results from last 7 days   Lab Units 07/30/24  0512   SODIUM mmol/L 140   POTASSIUM mmol/L 3.9   CHLORIDE mmol/L 107   CO2 mmol/L 24   BUN mg/dL 37*   CREATININE mg/dL 1.07   ANION GAP mmol/L 9   CALCIUM mg/dL 8.0*   GLUCOSE RANDOM mg/dL 121                         Imaging: I have reviewed pertinent imaging       Recent Cultures (last 7 days):     Results from last 7 days   Lab Units 07/28/24  1332   URINE CULTURE  >100,000 cfu/ml Corynebacterium urealyticum*  40,000-49,000 cfu/ml Escherichia coli*       Last 24 Hours Medication List:   Current  Facility-Administered Medications   Medication Dose Route Frequency Provider Last Rate    acetaminophen  975 mg Oral Q8H [facility] Sung Lainez DO      aluminum-magnesium hydroxide-simethicone  30 mL Oral Q6H PRN Mika Jackson MD      amLODIPine  5 mg Oral Daily Mika Jackson MD      aspirin  81 mg Oral Daily Mika Jackson MD      atorvastatin  80 mg Oral Daily With Dinner Mika Jackson MD      bisacodyl  10 mg Rectal Daily PRN Mika Jackson MD      busPIRone  2.5 mg Oral BID Mika Jackson MD      carBAMazepine  200 mg Oral BID Mika Jackson MD      cefTRIAXone  1,000 mg Intravenous Q24H Mika Jackson MD 1,000 mg (07/30/24 1312)    ferrous sulfate  325 mg Oral Daily With Breakfast Sung Lainez DO      furosemide  40 mg Intravenous BID (diuretic) Sung Lainez DO      lisinopril  20 mg Oral Daily Mika Jackson MD      LORazepam  0.5 mg Oral HS Mika Jackson MD      metoprolol  5 mg Intravenous Q4H PRN Sung Lainez DO      metoprolol succinate  50 mg Oral BID Sung Lainez DO      OLANZapine  2.5 mg Intramuscular Q8H PRN Sung Lainez DO      ondansetron  4 mg Intravenous Q6H PRN Mika Jackson MD      pantoprazole  20 mg Oral Daily Before Breakfast Mika Jackson MD      sertraline  25 mg Oral Daily Mika Jackson MD          Today, Patient Was Seen By: Sung Lainez DO    ** Please Note: Dictation voice to text software may have been used in the creation of this document. **

## 2024-07-30 NOTE — OCCUPATIONAL THERAPY NOTE
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    GERD (gastroesophageal reflux disease)     Hypertension     Migraine without aura     Recurrent UTI (urinary tract infection)     Vitamin D deficiency     measurable     Past Surgical History  Past Surgical History:   Procedure Laterality Date    CHOLECYSTECTOMY      TOTAL ABDOMINAL HYSTERECTOMY W/ BILATERAL SALPINGOOPHORECTOMY           07/30/24 0835   Note Type   Note type Evaluation   Pain Assessment   Pain Assessment Tool FLACC   Pain Rating: FLACC (Rest) - Face 0   Pain Rating: FLACC (Rest) - Legs 0   Pain Rating: FLACC (Rest) - Activity 0   Pain Rating: FLACC (Rest) - Cry 0   Pain Rating: FLACC (Rest) - Consolability 0   Score: FLACC (Rest) 0   Restrictions/Precautions   Weight Bearing Precautions Per Order No   Other Precautions 1:1;Cognitive;Chair Alarm;Bed Alarm;Fall Risk   Home Living   Type of Home SNF  ([facility]--long-term care)   Home Layout One level   Home Equipment Walker   Prior Function   Level of [place] Needs assistance with ADLs;Needs assistance with functional mobility   Lives With Facility staff   Falls in the last 6 months 1 to 4   Lifestyle   Autonomy Per chart, PTA pt had assistance with her ADLs; ambulates with RW; +falls   Reciprocal Relationships unable to assess   Service to Others unable to assess   Intrinsic Gratification unable to assess   Subjective   Subjective \"I'm cold.\"   ADL   Where Assessed Edge of bed   Eating Assistance 2  Maximal Assistance   Grooming " Assistance 2  Maximal Assistance   UB Bathing Assistance 2  Maximal Assistance   LB Bathing Assistance 1  Total Assistance   UB Dressing Assistance 2  Maximal Assistance   LB Dressing Assistance 1  Total Assistance   Toileting Assistance  1  Total Assistance   Bed Mobility   Rolling R 1  Dependent   Additional items Assist x 1;Increased time required;Verbal cues;LE management   Rolling L 1  Dependent   Additional items Assist x 1;Increased time required;Verbal cues;LE management   Supine to Sit 2  Maximal assistance   Additional items Assist x 2;Increased time required;Verbal cues;LE management   Sit to Supine 1  Dependent   Additional items Assist x 2;Increased time required;Verbal cues   Transfers   Sit to Stand 2  Maximal assistance   Additional items Assist x 2;Increased time required;Verbal cues   Stand to Sit 2  Maximal assistance   Additional items Assist x 2;Increased time required;Verbal cues   Functional Mobility   Functional Mobility   (pt very lethargic; currently recommend hoyerlift for OOB with nsg)   Balance   Static Sitting Poor +   Dynamic Sitting Poor   Static Standing Poor -   Activity Tolerance   Activity Tolerance Other (Comment)  (lethargy, poor involvement with evaluation process)   RUE Assessment   RUE Assessment X  (PROM=WFLs; actively moving b/l UE, but unable to follow directions for ROM testing)   RUE Strength   RUE Overall Strength   (3-/5 throughout)   LUE Assessment   LUE Assessment   (PROM=WFLs; actively moving b/l UE, but unable to follow directions for ROM testing)   LUE Strength   LUE Overall Strength   (3-/5 throughout)   Hand Function   Gross Motor Coordination Impaired   Fine Motor Coordination Impaired   Sensation   Light Touch   (unable to assess)   Proprioception   Proprioception   (unable to assess)   Vision-Basic Assessment   Current Vision   (keeped eyes closed during evaluation)   Vision - Complex Assessment   Acuity   (unable to assess)   Psychosocial   Psychosocial  (WDL) X   Patient Behaviors/Mood Flat affect   Perception   Inattention/Neglect   (unable to assess)   Cognition   Overall Cognitive Status Impaired   Arousal/Participation Lethargic   Attention Difficulty attending to directions   Orientation Level Disoriented to place;Disoriented to situation;Disoriented to time   Memory Decreased short term memory;Decreased recall of recent events;Decreased recall of precautions   Following Commands Follows one step commands inconsistently   Comments hx dementia   Assessment   Limitation Decreased ADL status;Decreased UE ROM;Decreased UE strength;Decreased Safe judgement during ADL;Decreased cognition;Decreased endurance;Decreased high-level ADLs   Prognosis Poor   Assessment Pt is a 88y/o female admitted to the hospital 2* symptoms of increased falls, increased confusion, and increased urination. Pt noted with metabolic encephalopathy, UTI. Pt with PMH dementia, seizures, HTN, UTI, RADHA. Per chart, PTA pt had assistance with her ADLs; ambulates with RW; +falls. During initial eval, pt demonstrated deficits with her functional balance, functional mobility, ADL status, transfer safety, b/l UE strength, activity tolerance(currently poor=5-10mins), and cognition(i.e.orientation, memory, problem-solving, judgement/safety). Currently pt unable to demonstrate carryover with tx techniques 2* lethargy, decreased cognition. Pt would best benefit from an environment that provides for pt's many personal care needs. Acute OT tx not indicated at this time 2* pt's inability to demonstrate tx carryover. The patient's raw score on the AM-PAC Daily Activity Inpatient Short Form is 12. A raw score of less than 19 suggests the patient may benefit from discharge to post-acute rehabilitation services. Please refer to the recommendation of the Occupational Therapist for safe discharge planning.   Goals   Patient Goals unable to assess   Plan   OT Frequency Eval only   Discharge Recommendation   Rehab  Resource Intensity Level, OT II (Moderate Resource Intensity)   AM-PAC Daily Activity Inpatient   Lower Body Dressing 2   Bathing 2   Toileting 2   Upper Body Dressing 2   Grooming 2   Eating 2   Daily Activity Raw Score 12   Daily Activity Standardized Score (Calc for Raw Score >=11) 30.6   AM-PAC Applied Cognition Inpatient   Following a Speech/Presentation 2   Understanding Ordinary Conversation 2   Taking Medications 1   Remembering Where Things Are Placed or Put Away 1   Remembering List of 4-5 Errands 1   Taking Care of Complicated Tasks 1   Applied Cognition Raw Score 8   Applied Cognition Standardized Score 19.32   Tom Orozco

## 2024-07-30 NOTE — CASE MANAGEMENT
Case Management Discharge Planning Note    Patient name Adrianna Macias  Location [facility] /E2 -* MRN [id number]  : 1935 Date 2024       Current Admission Date: 2024  Current Admission Diagnosis:Metabolic encephalopathy   Patient Active Problem List    Diagnosis Date Noted Date Diagnosed    Fall 2024     Atrial fibrillation (HCC) 2024     Hypertension 2024     Metabolic encephalopathy 2024     UTI (urinary tract infection) 2024     Anemia 2024     Migraine without aura and without status migrainosus, not intractable 2019     History of seizure 2019     History of CVA (cerebrovascular accident) 2019       LOS (days): 2  Geometric Mean LOS (GMLOS) (days): 3.9  Days to GMLOS:2.1     OBJECTIVE:  Risk of Unplanned Readmission Score: 24.24         Current admission status: Inpatient   Preferred Pharmacy:   [id number] - [place] - [address]  [address] [place] [id number]  Phone: [phone] Fax: [phone]    [facility] - [place] - [address] [facility]  [address] [facility] [address]  Phone: [phone] Fax: [phone]    Primary Care Provider: Racquel Bryant DO    Primary Insurance: MEDICARE  Secondary Insurance: BLUE CROSS    DISCHARGE DETAILS:     Additional Comments: As pt is documented as oriented x1, CM called pt's son, Andre, to complete initial assessment. Pt's son did not answer, CM left voicemail requesting a return call. Per chart review, pt is a long term resident at [facility] - referral sent via [facility], they confirmed pt is a bed hold. CM department to follow.

## 2024-07-30 NOTE — ASSESSMENT & PLAN NOTE
- EKG July 10 and on admission showed a-fib with RVR  - no AV scott blockade on admission  - previously on Eliquis prior to admission (unclear if she is still on this), however she had had multiple falls within the past month  - AV scott blockade in-patient: Toprol-XL 50 mg BID  - TSH: 1.398  - Electrolytes: K 3.9 and Mg 2.3   - pt has underlying UTI    Chart review shows patient has had a history of PAF since 2021. Recently admitted several times for falls which increases the risk of aleks bleeds and internal bleeding on Eliquis. She is currently rate controlled on telemetry with rates ranging from  bpm.  - if rates continue to be controlled would continue with current rate controlled regimen. If rates increase > 110 bpm can consider increasing her Toprol-XL to 100 mg BID.  - agree with holding Eliquis with history of frequent falls  - will repeat echo

## 2024-07-30 NOTE — CONSULTS
Subjective:       Patient ID: Marianne Go is a 50 y.o. female.    Chief Complaint: Hospital Follow Up    Patient is here for followup for chronic conditions/hosp f/u.    Had severe chest pain, rad down L arm and found to have nstemi. Had LHC and stents placed in RCA. She was started on Imdur, Has been having severe headaches since.    She has had constant coughing since January. No clear cause found. Has had a few rounds of steroids w/o any relief. In retrospect she wonders if cough could be related to heart blockage. Denies sinus congestion or dripping in back of throat. Does get occasional acid reflux.    CT scan in hosp lungs appeared nml and no PE seen.  Review of Systems   Constitutional:  Positive for fatigue. Negative for activity change, appetite change and unexpected weight change.   Eyes:  Negative for visual disturbance.   Respiratory:  Positive for cough and shortness of breath. Negative for chest tightness.    Cardiovascular:  Negative for chest pain (resolved), palpitations and leg swelling.   Gastrointestinal:  Negative for abdominal distention and abdominal pain.   Genitourinary:  Negative for difficulty urinating and urgency.             Skin:  Negative for rash.         Objective:      Physical Exam  Vitals reviewed.   Constitutional:       General: She is not in acute distress.     Appearance: Normal appearance. She is well-developed. She is obese. She is not ill-appearing, toxic-appearing or diaphoretic.   HENT:      Head: Normocephalic and atraumatic.   Eyes:      General: No scleral icterus.     Pupils: Pupils are equal, round, and reactive to light.   Neck:      Thyroid: No thyromegaly.   Cardiovascular:      Rate and Rhythm: Normal rate and regular rhythm.      Heart sounds: Normal heart sounds. No murmur heard.    No friction rub. No gallop.   Pulmonary:      Effort: Pulmonary effort is normal. No respiratory distress.      Breath sounds: Normal breath sounds. No wheezing or rales.  [facility]  Consult  Name: Adrianna Macias 89 y.o. female I MRN: [id number]  Unit/Bed#: E2 -01 I Date of Admission: 7/28/2024   Date of Service: 7/30/2024 I Hospital Day: 2    Inpatient consult to Cardiology  Consult performed by: Krysten Garcia PA-C  Consult ordered by: Sung Lainez, DO        Assessment & Plan   Atrial fibrillation (HCC)  Assessment & Plan  - EKG July 10 and on admission showed a-fib with RVR  - no AV scott blockade on admission  - previously on Eliquis prior to admission (unclear if she is still on this), however she had had multiple falls within the past month  - AV scott blockade in-patient: Toprol-XL 50 mg BID  - TSH: 1.398  - Electrolytes: K 3.9 and Mg 2.3   - pt has underlying UTI    Chart review shows patient has had a history of PAF since 2021. Recently admitted several times for falls which increases the risk of aleks bleeds and internal bleeding on Eliquis. She is currently rate controlled on telemetry with rates ranging from  bpm.  - if rates continue to be controlled would continue with current rate controlled regimen. If rates increase > 110 bpm can consider increasing her Toprol-XL to 100 mg BID.  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 There is no distension.      Palpations: Abdomen is soft. There is no mass.      Tenderness: There is no abdominal tenderness. There is no guarding or rebound.   Musculoskeletal:         General: No tenderness. Normal range of motion.      Cervical back: Normal range of motion.   Lymphadenopathy:      Cervical: No cervical adenopathy.   Neurological:      General: No focal deficit present.      Mental Status: She is alert and oriented to person, place, and time.   Psychiatric:         Mood and Affect: Mood normal.         Speech: Speech normal.         Behavior: Behavior normal.       Assessment:       1. NSTEMI (non-ST elevated myocardial infarction)    2. Gastroesophageal reflux disease, unspecified whether esophagitis present        Plan:       Marianne was seen today for hospital follow up.    Diagnoses and all orders for this visit:    NSTEMI (non-ST elevated myocardial infarction)  Headaches since starting imdur --  Dc imdur, call if any chest pains/pressures recur  Keep next scheduled visit w/cards  Has cardiac rehab starting today    Cough -- could be anginal equivalent, also TERESA is possible. Reviewed CT Chest     Gastroesophageal reflux disease, unspecified whether esophagitis present  Could be source of her cough, Offered PPI she declines for now since on sev meds already    Fatigue -- hopefully will improve with cardiac rehab. Discussed need for wt loss      Will try to set up f/u with Dr Carlee Mcneil MD      Future Appointments   Date Time Provider Department Center   5/24/2023  8:45 AM Deborah Sandy, PT [facility] [place]   5/29/2023  8:00 AM Kathy Ziegler, PT [facility] [place]   5/31/2023  8:00 AM Kathy Ziegler PT [facility] [place]   6/5/2023  8:00 AM Adan Griffith PT [facility] [place]   6/7/2023  8:00 AM Kathy Ziegler PT [facility] [place]   6/14/2023  8:00 AM Kathy Ziegler, PT [facility] [place]   6/19/2023  8:00 AM Kathy  Present Illness:  Adrianna Macias is a 89 year old with PMH of advanced dementia, CVA in 2018, HTN, atrial fibrillation/ atrial flutter, and seizures  who presented to ED with concerns of AMS. Pt noted to have multiple falls from her wheelchair, urinary retention, and not acting like herself. She is from [facility].    Pt sleeping during my exam, she is a 1:1 right now. Most of the history is gathered from prior noted and documents. Per chart review, pt was seen in [facility] ED 07/10/24 with concerns of a UTI that was not getting any better. She also had AMS at this visit.Cardiac workup at this visit included an EKG that showed a-fib with RVR. Her BNP was elevated at 673. She was on anticoagulation at this visit. Prior to this, she was seen at [facility] 07/08/24 with a history of a fall and confusion. At this encounter, the ED provider discussed with her son Cipriano who wished that his mom stay out of the hospital as much as possible to prevent more confusion and agitation. She had no acute findings on CT scan of her head. Appears patient has been on anticoagulation since at least 2021.     Pt admitted to [facility] ED 07/28/24 with frequent falls and AMS. She was still taking Eliquis at this time. She has facial ecchymosis and behavior changes. Eliquis was stropped on 07/29/24 with concerns of interactions between tegretol.    Rhythm History: atrial fibrillation/a-flutter    Primary Cardiologist: follows with [facility] cardiology intermittently     ROS:  Review of Systems   Reason unable to perform ROS: pt sleeping, history of dementia.      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    GERD (gastroesophageal reflux disease)     Hypertension     Migraine without aura     Recurrent UTI (urinary tract infection)     Vitamin D deficiency     measurable      Past Surgical History:   Procedure  Laterality Date    CHOLECYSTECTOMY      TOTAL ABDOMINAL HYSTERECTOMY W/ BILATERAL SALPINGOOPHORECTOMY       Family History:     Family History   Problem Relation Age of Onset    Alzheimer's disease Mother 80        mid     Social History:       Social History     Socioeconomic History    Marital status: /Civil Union     Spouse name: Not on file    Number of children: Not on file    Years of education: Not on file    Highest education level: Not on file   Occupational History    Not on file   Tobacco Use    Smoking status: Never    Smokeless tobacco: Never   Vaping Use    Vaping status: Never Used   Substance and Sexual Activity    Alcohol use: Yes    Drug use: Not on file    Sexual activity: Not on file   Other Topics Concern    Not on file   Social History Narrative    Not on file     Social Determinants of Health     Financial Resource Strain: Low Risk  (1/9/2023)    Received from [facility]    Overall Financial Resource Strain (CARDIA)     Difficulty of Paying Living Expenses: Not hard at all   Food Insecurity: No Food Insecurity (1/9/2023)    Received from [facility]    Hunger Vital Sign     Worried About Running Out of Food in the Last Year: Never true     Ran Out of Food in the Last Year: Never true   Transportation Needs: No Transportation Needs (1/9/2023)    Received from [facility]    PRAPARE - Transportation     Lack of Transportation (Medical): No     Lack of Transportation (Non-Medical): No   Physical Activity: Not on file   Stress: Not on file   Social Connections: Not on file   Intimate Partner Violence: Not At Risk (1/9/2023)    Received from [facility]    Humiliation, Afraid, Rape, and Kick questionnaire     Fear of Current or Ex-Partner: No     Emotionally Abused: No     Physically Abused: No     Sexually Abused: No   Housing Stability: Not on file      Allergy:        Allergies   Allergen Reactions    Citalopram       Toney, PT [facility] [place]   6/21/2023  8:00 AM Kathy Ziegler, PT [facility] [place]   6/26/2023  8:00 AM Adan Griffith, PT [facility] [place]   6/28/2023  8:00 AM Kathy Ziegler, PT [facility] [place]   8/14/2023  9:45 AM Winter Meza MD [facility]                "Other reaction(s): Other (See Comments)  insomnia    Dextrose 5%-Electrolyte #48     Levofloxacin      Other reaction(s): Other (See Comments)  \"feels lousey\"    Morphine Other (See Comments)     unknown    Nitrofurantoin Other (See Comments)     diarrhea    Oxycodone Other (See Comments)     unknown    Paroxetine Other (See Comments)     nausea    Pseudoephedrine Other (See Comments)     insomnia     Medications:       Prior to Admission medications    Medication Sig Start Date End Date Taking? Authorizing Provider   Acetaminophen 500 MG Take 500 mg by mouth every 4 (four) hours as needed    Historical Provider, MD   amLODIPine (NORVASC) 5 mg tablet Take 5 mg by mouth 10/8/18   Historical Provider, MD   apixaban (ELIQUIS) 5 mg Take 5 mg by mouth 2 (two) times a day  11/5/18   Historical Provider, MD   aspirin (ECOTRIN LOW STRENGTH) 81 mg EC tablet Take 81 mg by mouth daily 1/23/19   Historical Provider, MD   atorvastatin (LIPITOR) 80 mg tablet Take 80 mg by mouth 10/8/18   Historical Provider, MD   bisacodyl (DULCOLAX) 10 mg suppository Insert 10 mg into the rectum daily as needed    Historical Provider, MD   busPIRone (BUSPAR) 5 mg tablet Take 2.5 mg by mouth 2 (two) times a day    Historical Provider, MD   calcium-vitamin D 250-100 MG-UNIT per tablet Take 1 tablet by mouth daily     Historical Provider, MD   carBAMazepine (CARBATROL) 200 mg 12 hr capsule TAKE ONE CAPSULE BY MOUTH TWICE DAILY 11/27/22   Jennifer Sesay PA-C   Cholecalciferol 2000 units CAPS Take 1 capsule by mouth 2 (two) times a day     Historical Provider, MD   glycerin adult 2 g suppository Insert 1 suppository into the rectum    Historical Provider, MD   hydrochlorothiazide (MICROZIDE) 12.5 mg capsule Take 12.5 mg by mouth  Patient not taking: Reported on 8/11/2021 10/8/18   Historical Provider, MD   lisinopril (ZESTRIL) 20 mg tablet Take 20 mg by mouth daily 10/8/18   Historical Provider, MD   LORazepam (ATIVAN) 0.5 mg tablet Take 0.5 mg by " mouth daily at bedtime 4/12/24   Historical Provider, MD   Magnesium Hydroxide (MILK OF MAGNESIA PO) Take by mouth    Historical Provider, MD   Melatonin 5 MG TABS Take 5 mg by mouth daily at bedtime    Historical Provider, MD   metFORMIN (GLUCOPHAGE-XR) 500 mg 24 hr tablet  10/4/22   Historical Provider, MD   METOPROLOL SUCCINATE ER PO Take 50 mg by mouth daily    Historical Provider, MD   multivitamin (THERAGRAN) TABS Take 1 tablet by mouth    Historical Provider, MD   omeprazole (PriLOSEC) 20 mg delayed release capsule Take 20 mg by mouth 2 (two) times a day  1/12/10   Historical Provider, MD   ondansetron (ZOFRAN) 4 mg tablet Take 4 mg by mouth daily as needed    Historical Provider, MD   sertraline (ZOLOFT) 25 mg tablet Take 25 mg by mouth 10/8/18   Historical Provider, MD   trimethoprim (PROLOPRIM) 100 mg tablet Take 100 mg by mouth  Patient not taking: Reported on 7/9/2024    Historical Provider, MD       Vitals:    07/29/24 1845 07/29/24 1936 07/29/24 2153 07/30/24 0802   BP: 164/94 152/90 (!) 146/104 (!) 163/110   BP Location: Right arm Right arm Right arm Left arm   Pulse: (!) 138 (!) 134 (!) 128 (!) 128   Resp: (!) 24 22 20 20   Temp:  98.7 °F (37.1 °C) 98.2 °F (36.8 °C) 97.5 °F (36.4 °C)   TempSrc:  Temporal Temporal Temporal   SpO2: (!) 87% 90% 91% 94%   Weight:       Height:         Exam:  Physical Exam  Vitals reviewed. Exam conducted with a chaperone present.   Constitutional:       General: She is sleeping.      Comments: Physical exam very limited. Pt sleeping.    HENT:      Nose: Nose normal.   Cardiovascular:      Rate and Rhythm: Normal rate. Rhythm irregularly irregular.      Heart sounds: No murmur heard.  Pulmonary:      Effort: Pulmonary effort is normal.   Abdominal:      General: Abdomen is flat.   Musculoskeletal:         General: No swelling.      Right lower leg: No edema.      Left lower leg: No edema.   Skin:     Findings: Bruising (left sided facial brusing) present.       DATA:         ECG:      Telemetry:   Atrial fibrillation. HR  bpm    Echocardiogram:  12/16/21    Left ventricle is normal in size. There is prominant basal septal   hypertrophy with sigmoid septum (1.7 cm at the base). Posterior wall   thickness is mildly increased. Systolic function is hyperdynamic with an   ejection fraction over 65% with mid-cavity obliteration. There is a   late-peaking intracavitary gradient likely due to hyperdynamic LV   function. No definite obstruction at the level of the LVOT. Wall motion is   within normal limits. Unable to assess diastolic function due to   incomplete data/conflicting data.     Right ventricle cavity is normal. Systolic function is normal.     The aortic valve is trileaflet. There is moderate sclerosis. There is   mild regurgitation. There is no evidence of aortic valve stenosis.     Mitral valve structure is normal. There is moderate annular   calcification. There is mild regurgitation. There is no evidence of mitral   valve stenosis.   11/11/20  Left ventricle is normal in size. There is moderate concentric left   ventricular hypertrophy. Systolic function is hyperdynamic with an   ejection fraction over 70% with cavity obliteration. There is a late   peaking intracavitary gradient.  No LVOT obstruction. Wall motion is   within normal limits.   Diastolic dysfunction with increased filling pressure.   Normal RV size and function.    Severely dilated left atrium.   Heavy mitral annular calcification with mild mitral regurgitation.   Aortic sclerosis with mild aortic regurgitation.   Mild pulmonary hypertension.     Ischemic Testing:  Stress test:   NM stress  test 03/28/2014  Normal study       Weights:    Wt Readings from Last 10 Encounters:   07/28/24 70.4 kg (155 lb 3.3 oz)   11/09/22 71.8 kg (158 lb 6.4 oz)   08/11/21 77.7 kg (171 lb 3.2 oz)   11/13/20 75.7 kg (166 lb 12.8 oz)   02/03/20 76.4 kg (168 lb 6.4 oz)   05/21/19 71.8 kg (158 lb 4.8 oz)   01/16/19 73.3 kg (161  lb 9.6 oz)     Lab Studies:               Results from last 7 days   Lab Units 07/30/24  0512 07/29/24  0554 07/28/24  1329   WBC Thousand/uL 10.61* 8.99 8.90   HEMOGLOBIN g/dL 7.9* 7.8* 7.8*   HEMATOCRIT % 27.5* 26.7* 27.2*   PLATELETS Thousands/uL 258 262 267     Results from last 7 days   Lab Units 07/30/24  0512 07/29/24  0554 07/28/24  1329   POTASSIUM mmol/L 3.9 4.1 3.6   CHLORIDE mmol/L 107 108 109*   CO2 mmol/L 24 23 24   BUN mg/dL 37* 39* 39*   CREATININE mg/dL 1.07 1.03 1.19   CALCIUM mg/dL 8.0* 8.1* 8.1*       Krysten Garcia PA-C

## 2024-07-30 NOTE — ASSESSMENT & PLAN NOTE
- presented to the ED with AMS and with fall secondary to UTI  - CT of head: left frontal hematoma extending into the left periorbital soft tissues no acute intracranial abnormality

## 2024-07-30 NOTE — PHYSICAL THERAPY NOTE
PT EVALUATION    Pt. Name: Adrianna Macias  Pt. Age: 89 y.o.  MRN: [id number]  LENGTH OF STAY: 2      Admitting Diagnoses:   Altered mental status [R41.82]  Anemia [D64.9]  Acute urinary tract infection [N39.0]  Hx of fall [Z91.81]    Past Medical History:   Diagnosis Date    Dyslipidemia     Generalized seizure (HCC)     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    GERD (gastroesophageal reflux disease)     Hypertension     Migraine without aura     Recurrent UTI (urinary tract infection)     Vitamin D deficiency     measurable       Past Surgical History:   Procedure Laterality Date    CHOLECYSTECTOMY      TOTAL ABDOMINAL HYSTERECTOMY W/ BILATERAL SALPINGOOPHORECTOMY         Imaging Studies:  XR hips bilateral 2 vw w pelvis if performed   Final Result by Judd Garcia MD (07/30 0657)      No acute osseous abnormality.                        Workstation performed: [id number]         XR chest portable   Final Result by Genoveva Sanchez MD (07/29 2053)      Moderate pulmonary edema with moderate pleural effusions and bibasilar atelectasis.            Workstation performed: [id number]         CT head wo contrast   Final Result by Darrick Grant MD (07/28 4983)      Left frontal scalp hematoma extending into the left periorbital soft tissues. Otherwise, no acute intracranial abnormality.                  Workstation performed: [id number]         CT spine cervical without contrast   Final Result by Darrick Grant MD (07/28 7444)      No cervical spine fracture or traumatic malalignment.      Moderate cervical spondylosis.      Left thyroid nodule which can be further evaluated with ultrasound.      Moderate bilateral pleural effusions.            Workstation performed: [id number]               07/30/24 0846   PT Last Visit   PT Visit Date 07/30/24   Note Type   Note type Evaluation   Pain Assessment   Pain Assessment Tool FLACC   Pain Score 2   Pain Location/Orientation Location:  [facility]   Hospital Pain Intervention(s) Repositioned;Ambulation/increased activity;Emotional support;Rest   Pain Rating: FLACC (Rest) - Face 0   Pain Rating: FLACC (Rest) - Legs 0   Pain Rating: FLACC (Rest) - Activity 0   Pain Rating: FLACC (Rest) - Cry 0   Pain Rating: FLACC (Rest) - Consolability 0   Score: FLACC (Rest) 0   Pain Rating: FLACC (Activity) - Face 1   Pain Rating: FLACC (Activity) - Legs 0   Pain Rating: FLACC (Activity) - Activity 0   Pain Rating: FLACC (Activity) - Cry 1   Pain Rating: FLACC (Activity) - Consolability 0   Score: FLACC (Activity) 2   Restrictions/Precautions   Weight Bearing Precautions Per Order No   Other Precautions 1:1;Cognitive;Chair Alarm;Bed Alarm;Fall Risk;Pain   Home Living   Type of Home SNF  (per chart review, presents from [facility])   Home Equipment Walker   Additional Comments pt poor historian, information gathered from chart review   Prior Function   Level of [place] Needs assistance with ADLs;Needs assistance with IADLS;Needs assistance with functional mobility  (amb w/ RW at baseline per chart review)   Lives With Facility staff   Receives Help From Other (Comment)  (facility staff)   Falls in the last 6 months 1 to 4  (x1 per chart review; potential fall leading to current admission)   General   Additional Pertinent History h/o CVA, HTN   Family/Caregiver Present No   Cognition   Overall Cognitive Status Unable to assess   Arousal/Participation Lethargic   Orientation Level Unable to assess   Memory Unable to assess   Following Commands Follows one step commands inconsistently   Comments pt very lethargic/drowsy t/o session   Subjective   Subjective Per nsg, pt appropriate for PT/OT eval   RUE Assessment   RUE Assessment   (refer to OT)   LUE Assessment   LUE Assessment   (refer to OT)   RLE Assessment   RLE Assessment WFL  (full PROM grossly; unable to assess MMT)   LLE Assessment   LLE Assessment WFL  (full PROM grossly; unable to assess MMT)    Bed Mobility   Rolling R 1  Dependent   Additional items Assist x 1;Verbal cues   Rolling L 1  Dependent   Additional items Assist x 1;Verbal cues   Supine to Sit 2  Maximal assistance   Additional items Assist x 2;Increased time required;Verbal cues;LE management  (trunk mgmt)   Sit to Supine 1  Dependent   Additional items Assist x 2;Increased time required;Verbal cues;LE management  (trunk mgmt)   Transfers   Sit to Stand 2  Maximal assistance   Additional items Assist x 2;Increased time required;Verbal cues  (w/ RW)   Stand to Sit 2  Maximal assistance   Additional items Assist x 2;Increased time required;Verbal cues  (w/ RW)   Additional Comments pt w/ decreased alertness during transfer; able to achieve ~75% full standing w/ max A   Ambulation/Elevation   Gait pattern Not tested   Ambulation/Elevation Additional Comments pt w/ decreased alertness/arousability and inability to independently maintain upright posture standing   Balance   Static Sitting Zero   Dynamic Sitting   (Zero)   Static Standing Zero  (w/ RW)   Endurance Deficit   Endurance Deficit No   Activity Tolerance   Activity Tolerance Other (Comment);Treatment limited secondary to medical complications (Comment)  (pt lethargic and decreased alertness t/o)   Medical Staff Made Aware OTR Tom   Nurse Made Aware yes,    Assessment   Prognosis Fair   Problem List Decreased strength;Decreased endurance;Impaired balance;Decreased mobility;Decreased cognition;Impaired judgement;Decreased safety awareness;Pain   Assessment Pt is a 89 y.o. female presented with AMS and increased falls from [place]. Pt admitted for UTI, metabolic encephalopathy, A-flutter, and anemia. PT consulted for mobility assessment and safe D/C planning with orders of Up and OOB. Please see above flowsheet for PLOF and home set-up. Pt extremely lethargic t/o session and unable to provide information on pre-admission levels. Per chart, PTA pt required A for ADLs/iADLs and was  ambulatory w/ RW. Upon exam, pt is dependent - max Ax1/2 for all bed mobility and max Ax2 w/ RW for transfers. Gait assessment not appropriate at this time d/t significantly decreased EOB balance and pt lethargy/lack of active participation. The patient's AM-PAC Basic Mobility Inpatient Short Form Raw Score is 6. A Raw score of less than or equal to 16 suggests the patient may benefit from discharge to post-acute rehabilitation services. Please also refer to the recommendation of the Physical Therapist for safe discharge planning. PT recommends Level II (moderate resource intensity) upon D/C. IPPT will continue during admission at a frequency of 1-2x/wk with a focus on further OOB activity and to address current deficits. Co-eval was necessary to complete this eval for the pts best interest given the pt's medical acuity and complexity.   Goals   Patient Goals none stated   STG Expiration Date 08/13/24   Short Term Goal #1 Goals to be met in 14 days: 1) Pt will increase strength and balance by 1/2 grade in order to demonstrate improvement in function and reduce fall risk 2) Pt will be mod Ax1 for all bed mobility in order to increase independence and decrease burden on caregivers. 3) Pt will be mod Ax1 for transfers in order to increase independence and decrease burden on caregivers. 4) Pt will be able to sit EOB w/ mod Ax1 for at least 5 minutes in order to demonstrate improved balance, decrease fall risk, and increase ability to participate in activity. 5) Pt will increase standing tolerance to at least 1 minute w/ mod Ax1 in order to demonstrate readiness for ambulation activity. 6) Pt will tolerate ambulation at a distance of >50' w/ appropriate AD and mod Ax1 in order to increase independence and ability to traverse household distances. 7) Pt will increase activity tolerance to at least 30 minutes in order to demonstrate improved cardiovascular endurance and ability to participate in tasks as required. 8)  Pt/caregiver education   PT Treatment Day 0   Plan   Treatment/Interventions Functional transfer training;LE strengthening/ROM;Therapeutic exercise;Endurance training;Cognitive reorientation;Patient/family training;Equipment eval/education;Bed mobility;Gait training;Compensatory technique education;Continued evaluation;Spoke to nursing;OT   PT Frequency 1-2x/wk   Discharge Recommendation   Rehab Resource Intensity Level, PT II (Moderate Resource Intensity)  (STR)   Equipment Recommended Walker  (pt has)   AM-PAC Basic Mobility Inpatient   Turning in Flat Bed Without Bedrails 1   Lying on Back to Sitting on Edge of Flat Bed Without Bedrails 1   Moving Bed to Chair 1   Standing Up From Chair Using Arms 1   Walk in Room 1   Climb 3-5 Stairs With Railing 1   Basic Mobility Inpatient Raw Score 6   Turning Head Towards Sound 2   Follow Simple Instructions 1   Low Function Basic Mobility Raw Score  9   Low Function Basic Mobility Standardized Score  12.55   [facility] Highest Level Of Mobility   -HL Goal 2: Bed activities/Dependent transfer   -HL Achieved 3: Sit at edge of bed   End of Consult   Patient Position at End of Consult Supine;Bed/Chair alarm activated;All needs within reach   End of Consult Comments Pt in stable condition. All needs met. Bed alarm activated. Left w/ 1:1 observer   Hx/personal factors: co-morbidities, advanced age, dec cognition, pain, h/o of falls, fall risk, and assist w/ ADL's  Examination: dec mobility, dec balance, dec endurance, dec amb, risk for falls, pain, dec cognition  Clinical: unpredictable (ongoing medical status, abnormal lab values, and risk for falls)  Complexity: high    Kunkle Will, SPT

## 2024-07-30 NOTE — ASSESSMENT & PLAN NOTE
- BP elevated this admission   - home hypertensive regimen: HCTZ 12.5 mg daily amlodipine 5 mg daily, lisinopril 20 mg daily, Toprol- XL 50 mg BID

## 2024-07-30 NOTE — PLAN OF CARE
Problem: PHYSICAL THERAPY ADULT  Goal: Performs mobility at highest level of function for planned discharge setting.  See evaluation for individualized goals.  Description: Treatment/Interventions: Functional transfer training, LE strengthening/ROM, Therapeutic exercise, Endurance training, Cognitive reorientation, Patient/family training, Equipment eval/education, Bed mobility, Gait training, Compensatory technique education, Continued evaluation, Spoke to nursing, OT  Equipment Recommended: Walker (pt has)       See flowsheet documentation for full assessment, interventions and recommendations.  Note: Prognosis: Fair  Problem List: Decreased strength, Decreased endurance, Impaired balance, Decreased mobility, Decreased cognition, Impaired judgement, Decreased safety awareness, Pain  Assessment: Pt is a 89 y.o. female presented with AMS and increased falls from [place]. Pt admitted for UTI, metabolic encephalopathy, A-flutter, and anemia. PT consulted for mobility assessment and safe D/C planning with orders of Up and OOB. Please see above flowsheet for PLOF and home set-up. Pt extremely lethargic t/o session and unable to provide information on pre-admission levels. Per chart, PTA pt required A for ADLs/iADLs and was ambulatory w/ RW. Upon exam, pt is dependent - max Ax1/2 for all bed mobility and max Ax2 w/ RW for transfers. Gait assessment not appropriate at this time d/t significantly decreased EOB balance and pt lethargy/lack of active participation. The patient's AM-PAC Basic Mobility Inpatient Short Form Raw Score is 6. A Raw score of less than or equal to 16 suggests the patient may benefit from discharge to post-acute rehabilitation services. Please also refer to the recommendation of the Physical Therapist for safe discharge planning. PT recommends Level II (moderate resource intensity) upon D/C. IPPT will continue during admission at a frequency of 1-2x/wk with a focus on further OOB activity and to  address current deficits. Co-eval was necessary to complete this eval for the pts best interest given the pt's medical acuity and complexity.        Rehab Resource Intensity Level, PT: II (Moderate Resource Intensity) (STR)    See flowsheet documentation for full assessment.

## 2024-07-30 NOTE — PROGRESS NOTES
Progress Note - Geriatric Medicine   Adrianna OBANDO Colleen 89 y.o. female MRN: [id number]  Unit/Bed#: E2 -01 Encounter: [id number]      Assessment/Plan:    Acute Metabolic Encephalopathy  Presented to the hospital for falls and altered mental status  Baseline mentation per the facility is alert and oriented to person versus person and place (they do note that she does tend to wax and wane throughout the day)  Current cause considerations   Suspected to be multifactorial secondary to dementia, suspected UTI, frequent falls, antibiotic use, lorazepam use, anemia, sleep disturbances, and hospitalization  UA on admission positive for large blood, large leukocytes, innumerable RBC and WBC, and moderate bacteria  Urine culture revealed > 100,000 cFu Corynebacterium urealyticum and 40-49,000 cFu E Coli  Very mild leukocytosis noted on labs today at 10.61  Hemoglobin on labs today noted to be 7.9  She is alert and oriented to person and place on my exam today  She does appear to be restless  She is currently on 1:1 supervision for safety  Identify and treat reversible causes of confusion including infection, dehydration, electrolyte imbalance, anemia, hypoxia, urinary retention, constipation, pain, and sleep disturbance  Maintain delirium precautions   Provide redirection, reorientation, and distraction techniques  Maintain fall and safety precautions   Assist with ADLs/IADLs  Avoid deliriogenic medications such as tramadol, benzodiazepines, anticholinergics, benadryl  Treat pain using geriatric pain protocol   Encourage oral hydration and nutrition   Monitor for constipation and urinary retention   Implement sleep hygiene and limit night time interuptions   Maintain sleep-wake cycle   Encourage early and frequent mobilization   Encourage participation in group activities  Most recent EKG on 7/28/2024 revealed a QTc interval of 409  May want to avoid antipsychotics for acute agitation behaviors as these medications lower  the seizure threshold and the patient does have a seizure history  If all other interventions are unsuccessful for acute agitation behaviors, can consider Depakote 125 mg once (may use IV Depacon if patient is not taking oral medication)  Would monitor on this medication as Depakote can increase the effects of Tegretol  Would avoid benzodiazepines such as Ativan as these can worsen delirium   Redirect and reorient as needed  Keep mentally, physically, and socially active    Vascular Dementia Without Behavioral Disturbance, Psychotic Disturbance, and Mood Disturbance   Patient has a known documented history  Baseline mentation per the facility he is oriented to person versus person and place (mentation waxes and wanes at baseline)  Facility notes that she is impulsive and does have occasional behaviors  Patient is alert and oriented to person and place on my exam today  She does appear to be restless today  She is currently on 1:1 revision for safety  Most recent TSH on 7/29/2024 noted to be 1.398  Most recent vitamin B12 level on 7/29/2024 noted to be 188  Recommend supplementing with a goal vitamin B12 level > 400  CT of the head on 7/28/2024 revealed moderate chronic microvascular ischemic changes  Patient scored 19/30 on MoCA on 6/20/2019  Maintain delirium precautions as discussed above  Redirect and reorient as needed  Keep physically, mentally, and socially active    Deconditioning   Patient is at increased risk for deconditioning secondary to dementia, UTI, frequent falls, antibiotic use, lorazepam use, anemia, weakness, gait dysfunction, and hospitalization  Continue to optimize diet, hydration, and mobility for healing   GFR 46 on labs today  Patient has no documented history of CKD  Baseline creatinine unclear  Avoid nephrotoxic medication renal dose medication  Keep hydrated  Type II Diabetes   Most recent hemoglobin A1c on 9/26/2022 noted to be 6.7  Unclear if patient is on any medication for this at  baseline (still awaiting medication list from the facility)  Patient is not currently on blood sugar checks  Glucose on labs this morning noted to be 121  Consider diabetic diet  Avoid hypoglycemia  Anemia   Baseline hemoglobin unclear  Hemoglobin on labs today noted to be 7.9  Patient has had a few recent falls and is noted to have facial ecchymosis and scalp hematoma  No active signs of bleeding noted on exam today  Continue to monitor CBC  Transfuse for hemoglobin < 7   Monitor for signs and symptoms of infection, dehydration, DVT, and skin breakdown    Frailty   Clinical Frail Scale: 6- Moderately Frail  Need help with all outside activities  Need help with stairs and bathing  May need assistance with dressing  Most recent albumin on 7/9/2024 noted to be 3.7  Consider nutrition consult  Encourage protein supplementation     Ambulatory Dysfunction/Falls  Facility notes the patient has had 2 recent falls  She has been ambulating with a roller walker at baseline  PT/OT consulted to assist with strengthening/mobility and assist with discharge planning to appropriate level of care  Assess patient frequently for physical needs, encourage use of assistant devices as needed and directed by PT/OT  Identify cognitive and physical deficits and behaviors that affect risk of falls  Consider moving patient closer to nursing station to monitor more closely for impulsive behavior which may increase risk of falls  [facility] fall precautions   Educate patient/family on patient safety including physical limitations and importance of using call bell for assistance   Modify environment to reduce risk of injury including disconnecting from pole when not in use, ensuring adequate lighting in room and restroom, ensuring that path to restroom is clear and free of trip hazards  Out of bed as tolerated     Dentition/Appetite   Facility is unsure if patient wears dentures  She does have a good appetite at baseline  No meal completions  documented since admission  Ensure meal consistency is appropriate for all abilities   Consider nutrition consult   Continue aspiration precautions     Elimination   Facility notes the patient was primarily continent of bowel and bladder up until recently  She will ambulate herself independently to the bathroom when she had to go  Patient has recently been incontinent  She has been incontinent of bowel and bladder while inpatient  Last documented bowel movement was yesterday  Patient is currently on ferrous sulfate which increases the risk of constipation  She is not currently on a bowel regimen  Monitor for constipation and urinary retention     Insomnia   Facility notes that recently the patient had been having difficulty sleeping  They note that she was asleep for 20 minutes and then will get up and ambulate the halls  Unclear if she is on any medication for sleep at baseline  Awaiting updated medication list from the facility  Patient has not been sleeping well here  Patient is currently on lorazepam at bedtime  PDMP reviewed and appears the patient has been on this since at least June 2023  Will consider titrating up this medication in the outpatient setting as benzodiazepines are linked to cognitive impairment, falls, sedation, psychomotor impairment, and delirium  Can consider starting melatonin 3 mg daily at bedtime  First line is behavioral therapy   Avoid sedative hypnotics including benzodiazepines and benadryl  Encourage staying awake during the day   Encourage daytime activities and morning exercise   Decrease or eliminate daytime naps   Avoid caffeine especially during late afternoon and evening hours  Establish a nighttime routine  Implement sleep hygiene and limit nighttime interruptions    Anxiety/Depression  Patient has a documented history of anxiety and depression  She appears to be on lorazepam 0.5 mg daily at bedtime  Would consider titrating off this medication in the outpatient setting as  "benzodiazepines are negative cognitive impairment, falls, sedation, psychomotor impairment, and delirium  Mood appears stable on exam today  Continue supportive care     Urinary Tract Infection  Patient presented to hospital with altered mental status  UA on admission positive for large blood, large leukocytes, innumerable WBCs and RBCs, and moderate bacteria  Urine culture revealed > 100,000 cFu Corynebacterium urealyticum and 40-49,000 cFu E Coli  Await sensitivities   Patient is having urinary frequency per the PCA on patients 1:1   Monitor for urinary retention  Management per primary team    History of CVA  Patient with history of embolic CVA  She is on aspirin and statin at baseline  Per chart review, is unclear if she is on Eliquis (appears this was last dispensed on 12/26/2023)  Attempting to confirm with the facility (awaiting medication list to be faxed over from the facility)  Eliquis currently on hold for now  If patient is on this may need to consider risk versus benefit secondary to increased falls and significant facial bruising status post falls  Management per primary team      Subjective:   The patient is being seen and evaluated today at the bedside for geriatric follow-up.  She is noted be lying in bed comfortably in no acute distress.  She does appear to be restless at times.  She is alert and oriented to person and place on exam today.  She is currently denying pain.    Care was coordinated with patients nurse .      Review of Systems   Unable to perform ROS: Dementia       Objective:     Vitals: Blood pressure 147/99, pulse 90, temperature (!) 94 °F (34.4 °C), temperature source Temporal, resp. rate 20, height 5' 2\" (1.575 m), weight 70.3 kg (155 lb), SpO2 94%.,Body mass index is 28.35 kg/m².    No intake or output data in the 24 hours ending 07/30/24 1416    Current Medications: Reviewed    Physical Exam:   Physical Exam  Vitals and nursing note reviewed.   Constitutional:       General: " "She is not in acute distress.     Appearance: She is not ill-appearing.   HENT:      Head: Normocephalic.      Mouth/Throat:      Mouth: Mucous membranes are dry.   Eyes:      General: No scleral icterus.     Conjunctiva/sclera: Conjunctivae normal.   Cardiovascular:      Rate and Rhythm: Normal rate. Rhythm irregular.   Pulmonary:      Effort: Pulmonary effort is normal. No respiratory distress.   Abdominal:      General: Bowel sounds are normal. There is no distension.      Palpations: Abdomen is soft.      Tenderness: There is no abdominal tenderness.   Musculoskeletal:         General: No swelling or tenderness.   Skin:     General: Skin is warm and dry.      Findings: Bruising (facial and left hip) present.   Neurological:      Mental Status: She is alert. She is disoriented.      Motor: Weakness present.      Gait: Gait abnormal.      Comments: Oriented to person and place  Disoriented to time  Hx of dementia          Invasive Devices       Peripheral Intravenous Line  Duration             Peripheral IV 07/28/24 Left;Proximal;Ventral (anterior) Forearm 2 days                    Lab, Imaging and other studies: I have personally reviewed pertinent reports.      Please note:  Voice-recognition software may have been used in the preparation of this document.  Occasional wrong word or \"sound-alike\" substitutions may have occurred due to the inherent limitations of voice recognition software.  Interpretation should be guided by context.    "

## 2024-07-30 NOTE — ASSESSMENT & PLAN NOTE
Patient presents with atrial flutter with RVR.  Suspect secondary to infection  Rates improved today  Continue increased dose metoprolol succinate 50 mg BID plus prn IV lopressor   Monitor on telemetry overnight

## 2024-07-30 NOTE — ASSESSMENT & PLAN NOTE
UA concerning for infection, reporting urinary frequency and incontinence  Continue ceftriaxone  Cultures pending-polymicrobial Corynebacterium, E. coli

## 2024-07-30 NOTE — ASSESSMENT & PLAN NOTE
Wt Readings from Last 3 Encounters:   07/30/24 70.3 kg (155 lb)   11/09/22 71.8 kg (158 lb 6.4 oz)   08/11/21 77.7 kg (171 lb 3.2 oz)     Chest x-ray with pleural effusions as well as pulmonary edema  EF 70%, severely dilated bilateral atrium, severe mitral valve regurgitation, severe tricuspid valve regurgitation  Continue IV Lasix 40 mg twice daily  Appreciate cardiology recommendations

## 2024-07-30 NOTE — CASE MANAGEMENT
Case Management Assessment & Discharge Planning Note    Patient name Adrianna Macias  Location [facility] 2 /E2 -* MRN [id number]  : 1935 Date 2024       Current Admission Date: 2024  Current Admission Diagnosis:Metabolic encephalopathy   Patient Active Problem List    Diagnosis Date Noted Date Diagnosed    Fall 2024     Atrial fibrillation (HCC) 2024     Hypertension 2024     Metabolic encephalopathy 2024     UTI (urinary tract infection) 2024     Anemia 2024     Migraine without aura and without status migrainosus, not intractable 2019     History of seizure 2019     History of CVA (cerebrovascular accident) 2019       LOS (days): 2  Geometric Mean LOS (GMLOS) (days): 3.9  Days to GMLOS:2     OBJECTIVE:    Risk of Unplanned Readmission Score: 25.31         Current admission status: Inpatient       Preferred Pharmacy:   [id number] - [place] - [address]  [address] [place] [address]  Phone: [phone] Fax: [phone]    [facility] [place] - [address] [facility]  [address] [facility] [address]  Phone: [phone] Fax: [phone]    Primary Care Provider: Racquel Bryant DO    Primary Insurance: MEDICARE  Secondary Insurance: BLUE CROSS    ASSESSMENT:  Active Health Care Proxies       Lb Macias Health Care Representative - Son   Primary Phone: [phone] (Mobile)                 Advance Directives  Does patient have a Health Care POA?: Yes  Does patient have Advance Directives?: Yes  Advance Directives: Power of  for health care  Primary Contact: Andre Macias/Son ([phone])    Readmission Root Cause  30 Day Readmission: No    Patient Information  Admitted from:: Other (comment) ([facility])  Mental Status: Confused  During Assessment patient was accompanied by: Son  Assessment information provided by:: Son  Support Systems:  Son, Family members, Home care staff  County of Residence: [place]  [place] [facility] do you live in?: [place]  Home entry access options. Select all that apply.: No steps to enter home  Type of Current Residence: Facility ([facility])    Activities of Daily Living Prior to Admission  Functional Status: Assistance  Completes ADLs independently?: No  Level of ADL dependence: Assistance  Ambulates independently?: No  Level of ambulatory dependence: Assistance  Does patient use assisted devices?: Yes  Assisted Devices (DME) used: Wheelchair  Does patient currently own DME?: Yes  What DME does the patient currently own?: Wheelchair    Patient Information Continued  Income Source: SSI/SSD  Does patient have prescription coverage?: Yes  Does patient have a history of substance abuse?: No  Does patient have a history of Mental Health Diagnosis?: No      Social Determinants of Health (SDOH)      Flowsheet Row Most Recent Value   Housing Stability    In the last 12 months, was there a time when you were not able to pay the mortgage or rent on time? N   In the past 12 months, how many times have you moved where you were living? 0   At any time in the past 12 months, were you homeless or living in a shelter (including now)? N   Transportation Needs    In the past 12 months, has lack of transportation kept you from medical appointments or from getting medications? no   In the past 12 months, has lack of transportation kept you from meetings, work, or from getting things needed for daily living? No   Food Insecurity    Within the past 12 months, you worried that your food would run out before you got the money to buy more. Never true   Within the past 12 months, the food you bought just didn't last and you didn't have money to get more. Never true   Utilities    In the past 12 months has the electric, gas, oil, or water company threatened to shut off services in your home? No            DISCHARGE DETAILS:    Discharge planning  discussed with:: Pt's sonAndre  Freedom of Choice: Yes  Comments - Freedom of Choice: Pt's son in agreement with return to [facility] at time of discharge  CM contacted family/caregiver?: Yes (Assessment completed with pt's son by phone)  Were Treatment Team discharge recommendations reviewed with patient/caregiver?: Yes  Did patient/caregiver verbalize understanding of patient care needs?: Yes  Were patient/caregiver advised of the risks associated with not following Treatment Team discharge recommendations?: Yes    Contacts  Patient Contacts: Andre Macias/Son  Relationship to Patient:: Family  Contact Method: Phone  Phone Number: [phone]  Reason/Outcome: Continuity of Care, Emergency Contact, Discharge Planning    Other Referral/Resources/Interventions Provided:  Referral Comments: [facility] able to accept pt back at discharge, pt is a bed hold.    Treatment Team Recommendation: SNF  Discharge Destination Plan:: SNF     Additional Comments: CM spoke with pt's son who is in agreement with pt returning to [facility], where pt is a long term resident, at time of discharge. Pt's son reports that pt ambulates with a wheelchair while at [facility]. Patient reviewed during care coordination rounds with Dr. Lainez who informed that pt is now having a-flutter with RVR, Cards consulted. Geriatrics consulted as well. CM department to follow for medical stability.

## 2024-07-30 NOTE — ASSESSMENT & PLAN NOTE
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

## 2024-07-31 LAB
ANION GAP SERPL CALCULATED.3IONS-SCNC: 10 MMOL/L (ref 4–13)
BUN SERPL-MCNC: 32 MG/DL (ref 5–25)
CALCIUM SERPL-MCNC: 8 MG/DL (ref 8.4–10.2)
CHLORIDE SERPL-SCNC: 104 MMOL/L (ref 96–108)
CO2 SERPL-SCNC: 30 MMOL/L (ref 21–32)
CREAT SERPL-MCNC: 1.09 MG/DL (ref 0.6–1.3)
ERYTHROCYTE [DISTWIDTH] IN BLOOD BY AUTOMATED COUNT: 18.3 % (ref 11.6–15.1)
GFR SERPL CREATININE-BSD FRML MDRD: 45 ML/MIN/1.73SQ M
GLUCOSE SERPL-MCNC: 107 MG/DL (ref 65–140)
HCT VFR BLD AUTO: 28.7 % (ref 34.8–46.1)
HGB BLD-MCNC: 8.2 G/DL (ref 11.5–15.4)
MCH RBC QN AUTO: 21.2 PG (ref 26.8–34.3)
MCHC RBC AUTO-ENTMCNC: 28.6 G/DL (ref 31.4–37.4)
MCV RBC AUTO: 74 FL (ref 82–98)
PLATELET # BLD AUTO: 286 THOUSANDS/UL (ref 149–390)
PMV BLD AUTO: 10.4 FL (ref 8.9–12.7)
POTASSIUM SERPL-SCNC: 2.9 MMOL/L (ref 3.5–5.3)
RBC # BLD AUTO: 3.87 MILLION/UL (ref 3.81–5.12)
SODIUM SERPL-SCNC: 144 MMOL/L (ref 135–147)
WBC # BLD AUTO: 10.81 THOUSAND/UL (ref 4.31–10.16)

## 2024-07-31 PROCEDURE — 85027 COMPLETE CBC AUTOMATED: CPT | Performed by: INTERNAL MEDICINE

## 2024-07-31 PROCEDURE — 80048 BASIC METABOLIC PNL TOTAL CA: CPT | Performed by: INTERNAL MEDICINE

## 2024-07-31 PROCEDURE — 99232 SBSQ HOSP IP/OBS MODERATE 35: CPT | Performed by: INTERNAL MEDICINE

## 2024-07-31 PROCEDURE — 87081 CULTURE SCREEN ONLY: CPT | Performed by: INTERNAL MEDICINE

## 2024-07-31 PROCEDURE — 99232 SBSQ HOSP IP/OBS MODERATE 35: CPT

## 2024-07-31 RX ORDER — FUROSEMIDE 10 MG/ML
40 INJECTION INTRAMUSCULAR; INTRAVENOUS
Status: DISCONTINUED | OUTPATIENT
Start: 2024-07-31 | End: 2024-08-01

## 2024-07-31 RX ORDER — LANOLIN ALCOHOL/MO/W.PET/CERES
6 CREAM (GRAM) TOPICAL
Status: DISCONTINUED | OUTPATIENT
Start: 2024-07-31 | End: 2024-08-04 | Stop reason: HOSPADM

## 2024-07-31 RX ORDER — POTASSIUM CHLORIDE 20 MEQ/1
40 TABLET, EXTENDED RELEASE ORAL EVERY 4 HOURS
Status: DISCONTINUED | OUTPATIENT
Start: 2024-07-31 | End: 2024-07-31

## 2024-07-31 RX ORDER — METOPROLOL SUCCINATE 100 MG/1
100 TABLET, EXTENDED RELEASE ORAL 2 TIMES DAILY
Status: DISCONTINUED | OUTPATIENT
Start: 2024-07-31 | End: 2024-08-04 | Stop reason: HOSPADM

## 2024-07-31 RX ADMIN — ACETAMINOPHEN 975 MG: 325 TABLET, FILM COATED ORAL at 17:17

## 2024-07-31 RX ADMIN — HEPARIN SODIUM 5000 UNITS: 5000 INJECTION INTRAVENOUS; SUBCUTANEOUS at 05:25

## 2024-07-31 RX ADMIN — MELATONIN 6 MG: 3 TAB ORAL at 20:39

## 2024-07-31 RX ADMIN — METOPROLOL SUCCINATE 100 MG: 100 TABLET, EXTENDED RELEASE ORAL at 17:18

## 2024-07-31 RX ADMIN — CARBAMAZEPINE 200 MG: 200 TABLET ORAL at 17:18

## 2024-07-31 RX ADMIN — METOPROLOL SUCCINATE 100 MG: 100 TABLET, EXTENDED RELEASE ORAL at 10:18

## 2024-07-31 RX ADMIN — BUSPIRONE HYDROCHLORIDE 2.5 MG: 5 TABLET ORAL at 10:19

## 2024-07-31 RX ADMIN — OLANZAPINE 2.5 MG: 10 INJECTION, POWDER, FOR SOLUTION INTRAMUSCULAR at 02:38

## 2024-07-31 RX ADMIN — FUROSEMIDE 40 MG: 10 INJECTION, SOLUTION INTRAMUSCULAR; INTRAVENOUS at 17:18

## 2024-07-31 RX ADMIN — CARBAMAZEPINE 200 MG: 200 TABLET ORAL at 10:19

## 2024-07-31 RX ADMIN — ACETAMINOPHEN 975 MG: 325 TABLET, FILM COATED ORAL at 05:25

## 2024-07-31 RX ADMIN — AMLODIPINE BESYLATE 5 MG: 5 TABLET ORAL at 10:19

## 2024-07-31 RX ADMIN — ATORVASTATIN CALCIUM 80 MG: 80 TABLET, FILM COATED ORAL at 17:18

## 2024-07-31 RX ADMIN — POTASSIUM CHLORIDE 40 MEQ: 1500 TABLET, EXTENDED RELEASE ORAL at 10:18

## 2024-07-31 RX ADMIN — BUSPIRONE HYDROCHLORIDE 2.5 MG: 5 TABLET ORAL at 20:39

## 2024-07-31 RX ADMIN — PANTOPRAZOLE SODIUM 20 MG: 20 TABLET, DELAYED RELEASE ORAL at 06:07

## 2024-07-31 RX ADMIN — SERTRALINE HYDROCHLORIDE 25 MG: 25 TABLET ORAL at 10:19

## 2024-07-31 RX ADMIN — HEPARIN SODIUM 5000 UNITS: 5000 INJECTION INTRAVENOUS; SUBCUTANEOUS at 17:18

## 2024-07-31 RX ADMIN — LISINOPRIL 20 MG: 20 TABLET ORAL at 10:19

## 2024-07-31 RX ADMIN — POTASSIUM CHLORIDE 40 MEQ: 1500 TABLET, EXTENDED RELEASE ORAL at 12:02

## 2024-07-31 RX ADMIN — ACETAMINOPHEN 975 MG: 325 TABLET, FILM COATED ORAL at 20:39

## 2024-07-31 RX ADMIN — HEPARIN SODIUM 5000 UNITS: 5000 INJECTION INTRAVENOUS; SUBCUTANEOUS at 20:42

## 2024-07-31 RX ADMIN — ASPIRIN 81 MG: 81 TABLET, COATED ORAL at 10:18

## 2024-07-31 NOTE — ASSESSMENT & PLAN NOTE
Hemoglobin 7.8 on admission, previous baseline appears to be 9-10  Recent fall with multiple facial ecchymosis, scalp hematoma  Remained stable, no signs of active bleeding  B12, folic acid within normal limits  Continue iron tablets    [de-identified] : 54 y/o female presents for follow up of anxiety and insomnia issues and prescription renewals.\par She has been doing generally well but continues to have some issues with anxiety and insomnia.  She is only taking Zoloft 25 mg at present and takes half of a 0.5 mg Ativan for sleep.\par

## 2024-07-31 NOTE — PROGRESS NOTES
Progress Note - Geriatric Medicine   Adrianna TOPHER Macias 89 y.o. female MRN: [id number]  Unit/Bed#: E2 -01 Encounter: [id number]      Assessment/Plan:    Acute Metabolic Encephalopathy  Presented to the hospital for falls and altered mental status  Baseline mentation per the facility is alert and oriented to person versus person and place (they do note that she does tend to wax and wane throughout the day)  Current cause considerations   Suspected to be multifactorial secondary to dementia, suspected UTI, frequent falls, antibiotic use, lorazepam use, anemia, sleep disturbances, and hospitalization  UA on admission positive for large blood, large leukocytes, innumerable RBC and WBC, and moderate bacteria  Urine culture revealed > 100,000 cFu Corynebacterium urealyticum and 40-49,000 cFu E Coli  Very mild leukocytosis noted on labs today at 10.81  Hemoglobin on labs today noted to be 8.2  Patient is noted to be sleeping on exam today   Nursing notes she was agitated overnight and did require restraints   She has been sleeping much of the day today   Would attempt to wake patient this afternoon so that she is not sleeping all day and up all night  Reviewed medication list from the facility and patient is on Ativan gel at the facility and not oral Ativan   Question if the Ativan is playing a role in her increased agitation at night   Would recommend discontinuing lorazepam at bedtime and scheduling melatonin 6 mg at bedtime (patient is on melatonin at her facility)   She is remains on 1:1 supervision for safety  Identify and treat reversible causes of confusion including infection, dehydration, electrolyte imbalance, anemia, hypoxia, urinary retention, constipation, pain, and sleep disturbance  Maintain delirium precautions   Provide redirection, reorientation, and distraction techniques  Maintain fall and safety precautions   Assist with ADLs/IADLs  Avoid deliriogenic medications such as tramadol, benzodiazepines,  anticholinergics, benadryl  Treat pain using geriatric pain protocol   Encourage oral hydration and nutrition   Monitor for constipation and urinary retention   Implement sleep hygiene and limit night time interuptions   Maintain sleep-wake cycle   Encourage early and frequent mobilization   Encourage participation in group activities  Most recent EKG on 7/28/2024 revealed a QTc interval of 409  May want to avoid antipsychotics for acute agitation behaviors as these medications lower the seizure threshold and the patient does have a seizure history  If all other interventions are unsuccessful for acute agitation behaviors, can consider Depakote 125 mg once (may use IV Depacon if patient is not taking oral medication)  Would monitor on this medication as Depakote can increase the effects of Tegretol  Would avoid benzodiazepines such as Ativan as these can worsen delirium   Redirect and reorient as needed  Keep mentally, physically, and socially active    Vascular Dementia Without Behavioral Disturbance, Psychotic Disturbance, and Mood Disturbance   Patient has a known documented history  Baseline mentation per the facility he is oriented to person versus person and place (mentation waxes and wanes at baseline)  Facility notes that she is impulsive and does have occasional behaviors  Patient is sleeping on my exam today   She remains on 1:1 revision for safety  Most recent TSH on 7/29/2024 noted to be 1.398  Most recent vitamin B12 level on 7/29/2024 noted to be 188  Recommend supplementing with a goal vitamin B12 level > 400  CT of the head on 7/28/2024 revealed moderate chronic microvascular ischemic changes  Patient scored 19/30 on MoCA on 6/20/2019  Maintain delirium precautions as discussed above  Redirect and reorient as needed  Keep physically, mentally, and socially active    Deconditioning   Patient is at increased risk for deconditioning secondary to dementia, UTI, frequent falls, antibiotic use, lorazepam  use, anemia, sleep disturbances, weakness, gait dysfunction, and hospitalization  Continue to optimize diet, hydration, and mobility for healing   GFR 45 on labs today  Patient has no documented history of CKD  Baseline creatinine unclear  Avoid nephrotoxic medication renal dose medication  Keep hydrated  Type II Diabetes   Most recent hemoglobin A1c on 9/26/2022 noted to be 6.7  Patient is not on any medication for this at baseline   Patient is not currently on blood sugar checks  Glucose on labs this morning noted to be 107  Consider diabetic diet  Avoid hypoglycemia  Anemia   Baseline hemoglobin unclear  Hemoglobin on labs today noted to be 8.2  Patient has had a few recent falls and is noted to have facial ecchymosis and scalp hematoma  No active signs of bleeding noted on exam today  Continue to monitor CBC  Transfuse for hemoglobin < 7   Monitor for signs and symptoms of infection, dehydration, DVT, and skin breakdown    Frailty   Clinical Frail Scale: 6- Moderately Frail  Need help with all outside activities  Need help with stairs and bathing  May need assistance with dressing  Most recent albumin on 7/9/2024 noted to be 3.7  Consider nutrition consult  Encourage protein supplementation     Ambulatory Dysfunction/Falls  Facility notes the patient has had 2 recent falls  She has been ambulating with a roller walker at baseline  PT/OT consulted to assist with strengthening/mobility and assist with discharge planning to appropriate level of care  Assess patient frequently for physical needs, encourage use of assistant devices as needed and directed by PT/OT  Identify cognitive and physical deficits and behaviors that affect risk of falls  Consider moving patient closer to nursing station to monitor more closely for impulsive behavior which may increase risk of falls  [place] fall precautions   Educate patient/family on patient safety including physical limitations and importance of using call bell for  assistance   Modify environment to reduce risk of injury including disconnecting from pole when not in use, ensuring adequate lighting in room and restroom, ensuring that path to restroom is clear and free of trip hazards  Out of bed as tolerated     Dentition/Appetite   Facility is unsure if patient wears dentures  She does have a good appetite at baseline  Patient consumed 100% of dinner last night   Ensure meal consistency is appropriate for all abilities   Consider nutrition consult   Continue aspiration precautions     Elimination   Facility notes the patient was primarily continent of bowel and bladder up until recently  She will ambulate herself independently to the bathroom when she had to go  Patient has recently been incontinent  She has been incontinent of bowel and bladder while inpatient  Last documented bowel movement was on 7/29  Patient is currently on ferrous sulfate which increases the risk of constipation  She is not currently on a bowel regimen  Monitor for constipation and urinary retention     Insomnia   Facility notes that recently the patient had been having difficulty sleeping  They note that she was asleep for 20 minutes and then will get up and ambulate the halls  Patient is on melatonin at bedtime at her facility   Patient has not been sleeping well here  Patient is currently on lorazepam at bedtime  PDMP reviewed and appears the patient has been on this since at least June 2023  Per facility records, patient on lorazepam gel for anxiety as needed and not oral lorazepam  Would recommend discontinuing oral lorazepam as this may be increasing agitation and behaviors at night   Would recommend restarting melatonin 6 mg at bedtime and monitoring   First line is behavioral therapy   Avoid sedative hypnotics including benzodiazepines and benadryl  Encourage staying awake during the day   Encourage daytime activities and morning exercise   Decrease or eliminate daytime naps   Avoid caffeine  especially during late afternoon and evening hours  Establish a nighttime routine  Implement sleep hygiene and limit nighttime interruptions    Anxiety/Depression  Patient has a documented history of anxiety and depression  She is on lorazepam 0.5 mg daily at bedtime while inpatient   Per review of facility records she is on lorazepam gel Q 6 hours prn   Would recommend discontinuing scheduled lorazepam as this may be increasing behaviors and agitation at bedtime   Patient remains on sertraline   Continue supportive care     Urinary Tract Infection  Patient presented to hospital with altered mental status  UA on admission positive for large blood, large leukocytes, innumerable WBCs and RBCs, and moderate bacteria  Urine culture revealed > 100,000 cFu Corynebacterium urealyticum and 40-49,000 cFu E Coli  Patient continues with urinary frequency   Monitor for urinary retention  Management per primary team    History of CVA  Patient with history of embolic CVA  She is on aspirin and statin at baseline  Per facility records, patient is on Eliquis 5 mg BID   Eliquis currently on hold for now  If patient is on this may need to consider risk versus benefit secondary to increased falls and significant facial bruising status post falls  Management per primary team    Home Medication Review  Medication list obtained from facility records   Acetaminophen 500 mg TID and Q 4 hours prn moderate pain   Amlodipine 5 mg daily  Aspirin 81 mg daily  Atorvastatin 80 mg daily  Buspirone 2.5 mg BID  Carbamazepine 200 mg Q 12 hours  Eliquis 5 mg BID  Lisinopril 20 mg daily  Lorazepam gel 0.5 mg/0.5 mL apply topically Q 6 hours prn  Melatonin 5 mg daily at bedtime  Metoprolol succinate 50 mg daily  Multivitamin daily  Omeprazole 20 mg daily  Oyster calcium plus D5 100 mg/200IU BID  Sertraline 25 mg daily  Vitamin D3 50 mcg daily      Subjective:   The patient is being seen and evaluated today at the bedside for geriatric follow-up.  She is  "noted be lying in bed comfortably in no acute distress.  She is sleeping on my exam today. She does not appear to be in any pain or discomfort.     Care was coordinated with patients nurse Gabby. She notes the patient was agitated overnight and did not sleep well.     Care was also coordinated with Dr. Lainez with internal medicine.       Review of Systems   Unable to perform ROS: Dementia       Objective:     Vitals: Blood pressure 138/96, pulse (!) 109, temperature (!) 97.2 °F (36.2 °C), temperature source Temporal, resp. rate 22, height 5' 2\" (1.575 m), weight 70.3 kg (155 lb), SpO2 91%.,Body mass index is 28.35 kg/m².    No intake or output data in the 24 hours ending 07/31/24 1523    Current Medications: Reviewed    Physical Exam:   Physical Exam  Vitals and nursing note reviewed.   Constitutional:       General: She is sleeping. She is not in acute distress.     Appearance: She is not ill-appearing.   HENT:      Head: Normocephalic.   Cardiovascular:      Rate and Rhythm: Tachycardia present. Rhythm irregular.   Pulmonary:      Effort: Pulmonary effort is normal. No respiratory distress.   Abdominal:      General: Bowel sounds are normal. There is no distension.      Palpations: Abdomen is soft.   Musculoskeletal:      Right lower leg: No edema.      Left lower leg: No edema.   Skin:     General: Skin is warm and dry.      Findings: Bruising (facial and left hip) present.          Invasive Devices       Peripheral Intravenous Line  Duration             Peripheral IV 07/28/24 Left;Proximal;Ventral (anterior) Forearm 3 days                    Lab, Imaging and other studies: I have personally reviewed pertinent reports.      Please note:  Voice-recognition software may have been used in the preparation of this document.  Occasional wrong word or \"sound-alike\" substitutions may have occurred due to the inherent limitations of voice recognition software.  Interpretation should be guided by context.    "

## 2024-07-31 NOTE — PLAN OF CARE
Problem: Potential for Falls  Goal: Patient will remain free of falls  Description: INTERVENTIONS:  - Educate patient/family on patient safety including physical limitations  - Instruct patient to call for assistance with activity   - Consult OT/PT to assist with strengthening/mobility   - Keep Call bell within reach  - Keep bed low and locked with side rails adjusted as appropriate  - Keep care items and personal belongings within reach  - Initiate and maintain comfort rounds  - Make Fall Risk Sign visible to staff  - Offer Toileting every 2 Hours, in advance of need  - Initiate/Maintain bed alarm  - Obtain necessary fall risk management equipment: yellow socks, yellow bracelet, bed alarm  - Apply yellow socks and bracelet for high fall risk patients  - Consider moving patient to room near nurses station  Outcome: Progressing     Problem: Prexisting or High Potential for Compromised Skin Integrity  Goal: Skin integrity is maintained or improved  Description: INTERVENTIONS:  - Identify patients at risk for skin breakdown  - Assess and monitor skin integrity  - Assess and monitor nutrition and hydration status  - Monitor labs   - Assess for incontinence   - Turn and reposition patient  - Assist with mobility/ambulation  - Relieve pressure over bony prominences  - Avoid friction and shearing  - Provide appropriate hygiene as needed including keeping skin clean and dry  - Evaluate need for skin moisturizer/barrier cream  - Collaborate with interdisciplinary team   - Patient/family teaching  - Consider wound care consult   Outcome: Progressing     Problem: PAIN - ADULT  Goal: Verbalizes/displays adequate comfort level or baseline comfort level  Description: Interventions:  - Encourage patient to monitor pain and request assistance  - Assess pain using appropriate pain scale  - Administer analgesics based on type and severity of pain and evaluate response  - Implement non-pharmacological measures as appropriate and  evaluate response  - Consider cultural and social influences on pain and pain management  - Notify physician/advanced practitioner if interventions unsuccessful or patient reports new pain  Outcome: Progressing     Problem: INFECTION - ADULT  Goal: Absence or prevention of progression during hospitalization  Description: INTERVENTIONS:  - Assess and monitor for signs and symptoms of infection  - Monitor lab/diagnostic results  - Monitor all insertion sites, i.e. indwelling lines, tubes, and drains  - Monitor endotracheal if appropriate and nasal secretions for changes in amount and color  - [place] appropriate cooling/warming therapies per order  - Administer medications as ordered  - Instruct and encourage patient and family to use good hand hygiene technique  - Identify and instruct in appropriate isolation precautions for identified infection/condition  Outcome: Progressing     Problem: SAFETY ADULT  Goal: Patient will remain free of falls  Description: INTERVENTIONS:  - Educate patient/family on patient safety including physical limitations  - Instruct patient to call for assistance with activity   - Consult OT/PT to assist with strengthening/mobility   - Keep Call bell within reach  - Keep bed low and locked with side rails adjusted as appropriate  - Keep care items and personal belongings within reach  - Initiate and maintain comfort rounds  - Make Fall Risk Sign visible to staff  - Offer Toileting every 2 Hours, in advance of need  - Initiate/Maintain bed alarm  - Obtain necessary fall risk management equipment: yellow socks, yellow bracelet, bed alarm  - Apply yellow socks and bracelet for high fall risk patients  - Consider moving patient to room near nurses station  Outcome: Progressing     Problem: DISCHARGE PLANNING  Goal: Discharge to home or other facility with appropriate resources  Description: INTERVENTIONS:  - Identify barriers to discharge w/patient and caregiver  - Arrange for needed discharge  resources and transportation as appropriate  - Identify discharge learning needs (meds, wound care, etc.)  - Arrange for interpretive services to assist at discharge as needed  - Refer to Case Management Department for coordinating discharge planning if the patient needs post-hospital services based on physician/advanced practitioner order or complex needs related to functional status, cognitive ability, or social support system  Outcome: Progressing     Problem: Knowledge Deficit  Goal: Patient/family/caregiver demonstrates understanding of disease process, treatment plan, medications, and discharge instructions  Description: Complete learning assessment and assess knowledge base.  Interventions:  - Provide teaching at level of understanding  - Provide teaching via preferred learning methods  Outcome: Progressing     Problem: GENITOURINARY - ADULT  Goal: Maintains or returns to baseline urinary function  Description: INTERVENTIONS:  - Assess urinary function  - Encourage oral fluids to ensure adequate hydration if ordered  - Administer IV fluids as ordered to ensure adequate hydration  - Administer ordered medications as needed  - Offer frequent toileting  - Follow urinary retention protocol if ordered  Outcome: Progressing     Problem: SKIN/TISSUE INTEGRITY - ADULT  Goal: Skin Integrity remains intact(Skin Breakdown Prevention)  Description: Assess:  -Perform Willy assessment every shift  -Clean and moisturize skin  -Inspect skin when repositioning, toileting, and assisting with ADLS  -Assess under medical devices   -Assess extremities for adequate circulation and sensation     Bed Management:  -Have minimal linens on bed & keep smooth, unwrinkled  -Change linens as needed when moist or perspiring  -Avoid sitting or lying in one position for more than 2 hours while in bed  -Keep HOB at 30 degrees     Toileting:  -Offer bedside commode  -Assess for incontinence every 2 hours  -Use incontinent care products after  each incontinent episode    Activity:  -Mobilize patient 3 times a day  -Encourage activity and walks on unit  -Encourage or provide ROM exercises   -Turn and reposition patient every 2 Hours  -Use appropriate equipment to lift or move patient in bed  -Instruct/ Assist with weight shifting when out of bed in chair  -Consider limitation of chair time 1/2 hour intervals    Skin Care:  -Avoid use of baby powder, tape, friction and shearing, hot water or constrictive clothing  -Relieve pressure over bony prominences using allevyn  -Do not massage red bony areas    Next Steps:  -Teach patient strategies to minimize risks such as falls   -Consider consults to  interdisciplinary teams such as PT, OT  Outcome: Progressing     Problem: MUSCULOSKELETAL - ADULT  Goal: Maintain or return mobility to safest level of function  Description: INTERVENTIONS:  - Assess patient's ability to carry out ADLs; assess patient's baseline for ADL function and identify physical deficits which impact ability to perform ADLs (bathing, care of mouth/teeth, toileting, grooming, dressing, etc.)  - Assess/evaluate cause of self-care deficits   - Assess range of motion  - Assess patient's mobility  - Assess patient's need for assistive devices and provide as appropriate  - Encourage maximum independence but intervene and supervise when necessary  - Involve family in performance of ADLs  - Assess for home care needs following discharge   - Consider OT consult to assist with ADL evaluation and planning for discharge  - Provide patient education as appropriate  Outcome: Progressing     Problem: NEUROSENSORY - ADULT  Goal: Achieves maximal functionality and self care  Description: INTERVENTIONS  - Monitor swallowing and airway patency with patient fatigue and changes in neurological status  - Encourage and assist patient to increase activity and self care.   - Encourage visually impaired, hearing impaired and aphasic patients to use  assistive/communication devices  Outcome: Progressing     Problem: CARDIOVASCULAR - ADULT  Goal: Maintains optimal cardiac output and hemodynamic stability  Description: INTERVENTIONS:  - Monitor I/O, vital signs and rhythm  - Monitor for S/S and trends of decreased cardiac output  - Administer and titrate ordered vasoactive medications to optimize hemodynamic stability  - Assess quality of pulses, skin color and temperature  - Assess for signs of decreased coronary artery perfusion  - Instruct patient to report change in severity of symptoms  Outcome: Progressing     Problem: SAFETY,RESTRAINT: NV/NON-SELF DESTRUCTIVE BEHAVIOR  Goal: Remains free of harm/injury (restraint for non violent/non self-detsructive behavior)  Description: INTERVENTIONS:  - Instruct patient/family regarding restraint use   - Assess and monitor physiologic and psychological status   - Provide interventions and comfort measures to meet assessed patient needs   - Identify and implement measures to help patient regain control  - Assess readiness for release of restraint   Outcome: Progressing  Goal: Returns to optimal restraint-free functioning  Description: INTERVENTIONS:  - Assess the patient's behavior and symptoms that indicate continued need for restraint  - Identify and implement measures to help patient regain control  - Assess readiness for release of restraint   Outcome: Progressing     Problem: Nutrition/Hydration-ADULT  Goal: Nutrient/Hydration intake appropriate for improving, restoring or maintaining nutritional needs  Description: Monitor and assess patient's nutrition/hydration status for malnutrition. Collaborate with interdisciplinary team and initiate plan and interventions as ordered.  Monitor patient's weight and dietary intake as ordered or per policy. Utilize nutrition screening tool and intervene as necessary. Determine patient's food preferences and provide high-protein, high-caloric foods as appropriate.      INTERVENTIONS:  - Monitor oral intake, urinary output, labs, and treatment plans  - Assess nutrition and hydration status and recommend course of action  - Evaluate amount of meals eaten  - Assist patient with eating if necessary   - Allow adequate time for meals  - Recommend/ encourage appropriate diets, oral nutritional supplements, and vitamin/mineral supplements  - Order, calculate, and assess calorie counts as needed  - Recommend, monitor, and adjust tube feedings and TPN/PPN based on assessed needs  - Assess need for intravenous fluids  - Provide specific nutrition/hydration education as appropriate  - Include patient/family/caregiver in decisions related to nutrition  Outcome: Progressing

## 2024-07-31 NOTE — PROGRESS NOTES
Cardiology Progress Note   MD Wilson Richardson MD, [facility]  Jordi Vela DO, [facility]  MD Marry Rivera DO, [facility]  Cipriano Oswald DO, [facility]  ----------------------------------------------------------------  [address] [address]    Adrianna Macias 89 y.o. 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, October 2018  Severe mitral regurgitation  Moderate to severe tricuspid regurgitation  Mild mitral stenosis with mean PG 6 mmHg at 96 bpm and MVA 2.29 cm² by PHT, July 2024  Hypertension  LVEF 70%, moderate LVH, severe biatrial dilatation, AV sclerosis, mild AR, mild MS, severe MR, moderate to severe TR with PASP 56 mmHg, July 2024  History of seizure disorder  Urinary tract infection  Anemia    PLAN:  Heart rates are not sufficiently controlled and patient remains rapid  Will increase metoprolol to 100 mg twice daily  As the patient has significant hematoma without intracranial hemorrhage, anticoagulation has been temporarily held  Given the patient's history of prior CVA with atrial fibrillation, she is a [id number] or of at least 6  Given the patient's recurrent falls but elevated risk score, goals of care discussion should be had with family to discuss continuation versus cessation of anticoagulation  Cessation of anticoagulation will put the patient at extremely high risk of CVA recurrence in the future  Keep potassium greater than 4 magnesium greater than 2; repletion as per primary team  Continue amlodipine, metoprolol, aspirin and high intensity statin  Supportive care    Signed: Jordi Vela DO, [facility] CampusALEXANDRE, EVANGELINA LOVETT, FACP      History of Present Illness:  Patient seen and examined.  Patient remains on a one-to-one.  She is confused this morning and  "not answering questions appropriately.      Review of Systems:  Review of systems unobtainable secondary to mental status.    Allergies   Allergen Reactions    Citalopram      Other reaction(s): Other (See Comments)  insomnia    Dextrose 5%-Electrolyte #48     Levofloxacin      Other reaction(s): Other (See Comments)  \"feels lousey\"    Morphine Other (See Comments)     unknown    Nitrofurantoin Other (See Comments)     diarrhea    Oxycodone Other (See Comments)     unknown    Paroxetine Other (See Comments)     nausea    Pseudoephedrine Other (See Comments)     insomnia       No current facility-administered medications on file prior to encounter.     Current Outpatient Medications on File Prior to Encounter   Medication Sig    Acetaminophen 500 MG Take 500 mg by mouth every 4 (four) hours as needed    amLODIPine (NORVASC) 5 mg tablet Take 5 mg by mouth    apixaban (ELIQUIS) 5 mg Take 5 mg by mouth 2 (two) times a day     aspirin (ECOTRIN LOW STRENGTH) 81 mg EC tablet Take 81 mg by mouth daily    atorvastatin (LIPITOR) 80 mg tablet Take 80 mg by mouth    bisacodyl (DULCOLAX) 10 mg suppository Insert 10 mg into the rectum daily as needed    busPIRone (BUSPAR) 5 mg tablet Take 2.5 mg by mouth 2 (two) times a day    calcium-vitamin D 250-100 MG-UNIT per tablet Take 1 tablet by mouth daily     carBAMazepine (CARBATROL) 200 mg 12 hr capsule TAKE ONE CAPSULE BY MOUTH TWICE DAILY    Cholecalciferol 2000 units CAPS Take 1 capsule by mouth 2 (two) times a day     glycerin adult 2 g suppository Insert 1 suppository into the rectum    hydrochlorothiazide (MICROZIDE) 12.5 mg capsule Take 12.5 mg by mouth (Patient not taking: Reported on 8/11/2021)    lisinopril (ZESTRIL) 20 mg tablet Take 20 mg by mouth daily    LORazepam (ATIVAN) 0.5 mg tablet Take 0.5 mg by mouth daily at bedtime    Magnesium Hydroxide (MILK OF MAGNESIA PO) Take by mouth    Melatonin 5 MG TABS Take 5 mg by mouth daily at bedtime    metFORMIN (GLUCOPHAGE-XR) " 500 mg 24 hr tablet  (Patient not taking: Reported on 7/9/2024)    METOPROLOL SUCCINATE ER PO Take 50 mg by mouth daily    multivitamin (THERAGRAN) TABS Take 1 tablet by mouth    omeprazole (PriLOSEC) 20 mg delayed release capsule Take 20 mg by mouth 2 (two) times a day     ondansetron (ZOFRAN) 4 mg tablet Take 4 mg by mouth daily as needed    sertraline (ZOLOFT) 25 mg tablet Take 25 mg by mouth    trimethoprim (PROLOPRIM) 100 mg tablet Take 100 mg by mouth (Patient not taking: Reported on 7/9/2024)        Current Facility-Administered Medications   Medication Dose Route Frequency Provider Last Rate    acetaminophen  975 mg Oral Q8H [facility] Sung Lainez DO      aluminum-magnesium hydroxide-simethicone  30 mL Oral Q6H PRN Mika Jackson MD      amLODIPine  5 mg Oral Daily Mika Jackson MD      aspirin  81 mg Oral Daily Mika Jackson MD      atorvastatin  80 mg Oral Daily With Dinner Mika Jackson MD      bisacodyl  10 mg Rectal Daily PRN Mika Jackson MD      busPIRone  2.5 mg Oral BID Mika Jackson MD      carBAMazepine  200 mg Oral BID Mika Jackson MD      ferrous sulfate  325 mg Oral Daily With Breakfast Sung Lainez DO      furosemide  40 mg Intravenous BID (diuretic) Sung Lainez DO      heparin (porcine)  5,000 Units Subcutaneous Q8H [facility] Sung Lainez DO      lisinopril  20 mg Oral Daily Mika Jackson MD      LORazepam  0.5 mg Oral HS Mika Jackson MD      metoprolol  5 mg Intravenous Q4H PRN Sung Lainez DO      metoprolol succinate  50 mg Oral BID Sung Lainez DO      OLANZapine  2.5 mg Intramuscular Q8H PRN Sung Lainez DO      ondansetron  4 mg Intravenous Q6H PRN Mika Jackson MD      pantoprazole  20 mg Oral Daily Before Breakfast Mika Jackson MD      potassium chloride  40 mEq Oral Q4H Sung Lainez DO      sertraline  25 mg Oral Daily Mika Jackson MD              Vitals:    07/30/24 1500 07/30/24 1646 07/30/24 2317 07/31/24 0735   BP:  "144/88 144/88 (!) 115/102 138/96   BP Location: Right arm  Left arm Right arm   Pulse: 88 88 77 (!) 109   Resp: 20  18 22   Temp: (!) 97 °F (36.1 °C)  97.6 °F (36.4 °C) (!) 97.2 °F (36.2 °C)   TempSrc: Temporal  Temporal Temporal   SpO2: 96%  93% 91%   Weight:       Height:         Body mass index is 28.35 kg/m².    No intake or output data in the 24 hours ending 07/31/24 0900    Weight change:     PHYSICAL EXAMINATION:  Gen: Awake, Alert, NAD  Head/eyes: Significant facial ecchymoses, pupils equal and round, Anicteric  ENT: mmm  Neck: Supple, No elevated JVP, trachea midline  Resp: CTA bilaterally no w/r/r  CV: irreg irreg +S1, S2, No m/r/g  Abd: Soft, NT/ND + BS  Ext: no LE edema bilaterally  Neuro: Follows commands, moves all extermities  Psych: Appropriate affect, happy mood, pleasant attitude, non-combative  Skin: warm; no rash, erythema or venous stasis changes on exposed skin    Lab Results:  Results from last 7 days   Lab Units 07/31/24  0425   WBC Thousand/uL 10.81*   HEMOGLOBIN g/dL 8.2*   HEMATOCRIT % 28.7*   PLATELETS Thousands/uL 286     Results from last 7 days   Lab Units 07/31/24  0425   POTASSIUM mmol/L 2.9*   CHLORIDE mmol/L 104   CO2 mmol/L 30   BUN mg/dL 32*   CREATININE mg/dL 1.09   CALCIUM mg/dL 8.0*     No results found for: \"TROPONINT\"                Tele: AF w/ RVR 100s to 110s    This note was completed in part utilizing M-Modal Fluency Direct Software.  Grammatical errors, random word insertions, spelling mistakes, and incomplete sentences may be an occasional consequence of this system secondary to software limitations, ambient noise, and hardware issues.  If you have any questions or concerns about the content, text, or information contained within the body of this dictation, please contact the provider for clarification.      "

## 2024-07-31 NOTE — ASSESSMENT & PLAN NOTE
Patient presents with atrial flutter with RVR.  Suspect secondary to infection  Rates improved today but still not at goal  Continue increased dose metoprolol succinate 100 mg BID plus prn IV lopressor   Monitor on telemetry overnight  Appreciate cardiology recommendations

## 2024-07-31 NOTE — ASSESSMENT & PLAN NOTE
UA concerning for infection, reporting urinary frequency and incontinence  Cultures return  Patient completed adequate course of IV Rocephin

## 2024-07-31 NOTE — PROGRESS NOTES
[facility]  Progress Note  Name: Adrianna Macias I  MRN: [id number]  Unit/Bed#: E2 -01 I Date of Admission: 7/28/2024   Date of Service: 7/31/2024 I Hospital Day: 3    Assessment & Plan   * Metabolic encephalopathy  Assessment & Plan  89-year-old female presented with altered mental status, falls  Presented from nursing home, history of dementia with confusion at baseline.  History of multiple falls in the past  Remains confused  Suspect secondary to UTI, hospital delirium   Appreciate geriatrics recommendations  Delirium precautions  Currently requiring one to one and soft restraints due to impulsivity   PT/OT-likely return to facility when mental status improves    Acute diastolic heart failure (HCC)  Assessment & Plan  Wt Readings from Last 3 Encounters:   07/30/24 70.3 kg (155 lb)   11/09/22 71.8 kg (158 lb 6.4 oz)   08/11/21 77.7 kg (171 lb 3.2 oz)     Chest x-ray with pleural effusions as well as pulmonary edema  EF 70%, severely dilated bilateral atrium, severe mitral valve regurgitation, severe tricuspid valve regurgitation  Continue IV Lasix 40 mg twice daily  Appreciate cardiology recommendations        Fall  Assessment & Plan  Presented with multiple falls   Recent ED visit for fall   PT/OT   CM for assistance with placement on discharge     Atrial fibrillation (HCC)  Assessment & Plan  Patient presents with atrial flutter with RVR.  Suspect secondary to infection  Rates improved today but still not at goal  Continue increased dose metoprolol succinate 100 mg BID plus prn IV lopressor   Monitor on telemetry overnight  Appreciate cardiology recommendations    Anemia  Assessment & Plan  Hemoglobin 7.8 on admission, previous baseline appears to be 9-10  Recent fall with multiple facial ecchymosis, scalp hematoma  Remained stable, no signs of active bleeding  B12, folic acid within normal limits  Continue iron tablets     UTI (urinary tract infection)  Assessment & Plan  UA  concerning for infection, reporting urinary frequency and incontinence  Cultures return  Patient completed adequate course of IV Rocephin    Hypertension  Assessment & Plan  Continue metoprolol, amlodipine, lisinopril    History of CVA (cerebrovascular accident)  Assessment & Plan  History of embolic CVA, continue aspirin, statin,  Given frequent falls, may need to weigh risk benefits of continuing Eliquis.  Follow-up with PCP.    History of seizure  Assessment & Plan  Continue Tegretol 200 mg twice daily         VTE Pharmacologic Prophylaxis: heparin     Patient Centered Rounds:  Patient care rounds were performed with nursing    Time Spent for Care: I have spent a total time of 45 minutes on 24 in caring for this patient including Diagnostic results, Impressions, Counseling / Coordination of care, Documenting in the medical record, Reviewing / ordering tests, medicine, procedures  , Obtaining or reviewing history  , and Communicating with other healthcare professionals .      Current Length of Stay: 3 day(s)    Current Patient Status: Inpatient   Certification Statement: The patient will continue to require additional inpatient hospital stay due to management of afib with rvr, heart failure, encephalopathy     Discharge Plan: Return to facility when stable from cardiovascular perspective and off of observation/restraints for 24 hours    Code Status: Level 3 - DNAR and DNI      Subjective:   Patient seen and evaluated at bedside.  Remains confused.  Required as needed Zyprexa overnight.    Objective:     Vitals:   Temp (24hrs), Av.5 °F (36.4 °C), Min:97.2 °F (36.2 °C), Max:97.6 °F (36.4 °C)    Temp:  [97.2 °F (36.2 °C)-97.6 °F (36.4 °C)] 97.6 °F (36.4 °C)  HR:  [] 118  Resp:  [18-22] 20  BP: (115-138)/() 135/90  SpO2:  [91 %-96 %] 96 %  Body mass index is 28.35 kg/m².     Input and Output Summary (last 24 hours):     No intake or output data in the 24 hours ending 24 7543    Physical  Exam:     Physical Exam  Vitals reviewed.   Constitutional:       General: She is not in acute distress.     Appearance: She is well-developed. She is not ill-appearing, toxic-appearing or diaphoretic.   HENT:      Head:      Comments: Bruising on face     Mouth/Throat:      Mouth: Mucous membranes are moist.   Eyes:      General: No scleral icterus.     Extraocular Movements: Extraocular movements intact.   Cardiovascular:      Rate and Rhythm: Normal rate and regular rhythm.      Heart sounds: Normal heart sounds.   Pulmonary:      Effort: Pulmonary effort is normal. No respiratory distress.      Breath sounds: Rales present. No wheezing.   Abdominal:      General: There is no distension.      Palpations: Abdomen is soft.      Tenderness: There is no abdominal tenderness. There is no guarding or rebound.   Musculoskeletal:         General: No swelling, tenderness or deformity.   Skin:     General: Skin is warm and dry.   Neurological:      Mental Status: She is alert.      Comments: Confused, alert and oriented x 1, does follow commands, often has incoherent responses         Additional Data:     Labs: I have reviewed pertinent results     Results from last 7 days   Lab Units 07/31/24  0425 07/30/24  0512 07/29/24  0554   WBC Thousand/uL 10.81*   < > 8.99   HEMOGLOBIN g/dL 8.2*   < > 7.8*   HEMATOCRIT % 28.7*   < > 26.7*   PLATELETS Thousands/uL 286   < > 262   SEGS PCT %  --   --  76*   LYMPHO PCT %  --   --  13*   MONO PCT %  --   --  11   EOS PCT %  --   --  0    < > = values in this interval not displayed.     Results from last 7 days   Lab Units 07/31/24  0425   SODIUM mmol/L 144   POTASSIUM mmol/L 2.9*   CHLORIDE mmol/L 104   CO2 mmol/L 30   BUN mg/dL 32*   CREATININE mg/dL 1.09   ANION GAP mmol/L 10   CALCIUM mg/dL 8.0*   GLUCOSE RANDOM mg/dL 107         Results from last 7 days   Lab Units 07/30/24  2027   POC GLUCOSE mg/dl 148*                 Imaging: I have reviewed pertinent imaging       Recent  Cultures (last 7 days):     Results from last 7 days   Lab Units 07/28/24  1332   URINE CULTURE  >100,000 cfu/ml Corynebacterium urealyticum*  40,000-49,000 cfu/ml Escherichia coli*  10,000-19,000 cfu/ml       Last 24 Hours Medication List:   Current Facility-Administered Medications   Medication Dose Route Frequency Provider Last Rate    acetaminophen  975 mg Oral Q8H [facility] Sung Lainez DO      aluminum-magnesium hydroxide-simethicone  30 mL Oral Q6H PRN Mika Jackson MD      amLODIPine  5 mg Oral Daily Mika Jackson MD      aspirin  81 mg Oral Daily Mika Jackson MD      atorvastatin  80 mg Oral Daily With Dinner Mika Jackson MD      bisacodyl  10 mg Rectal Daily PRN Mika Jackson MD      busPIRone  2.5 mg Oral BID Mika Jackson MD      carBAMazepine  200 mg Oral BID Mika Jackson MD      ferrous sulfate  325 mg Oral Daily With Breakfast Sung Lainez DO      furosemide  40 mg Intravenous BID (diuretic) Sung Lainez DO      heparin (porcine)  5,000 Units Subcutaneous Q8H [facility] Sung Lainez DO      lisinopril  20 mg Oral Daily Mika Jackson MD      melatonin  6 mg Oral HS BRITTNEY Blair      metoprolol  5 mg Intravenous Q4H PRN Sung Lainez DO      metoprolol succinate  100 mg Oral BID Jordi Vela DO      OLANZapine  2.5 mg Intramuscular Q8H PRN Sung Lainez DO      ondansetron  4 mg Intravenous Q6H PRN Mika Jackson MD      pantoprazole  20 mg Oral Daily Before Breakfast Mika Jackson MD      potassium chloride  40 mEq Oral Q4H Sung Lainez DO      sertraline  25 mg Oral Daily Mika Jackson MD          Today, Patient Was Seen By: Sung Lainez DO    ** Please Note: Dictation voice to text software may have been used in the creation of this document. **

## 2024-08-01 ENCOUNTER — APPOINTMENT (INPATIENT)
Dept: RADIOLOGY | Facility: HOSPITAL | Age: 89
DRG: 070 | End: 2024-08-01
Payer: MEDICARE

## 2024-08-01 LAB
ANION GAP SERPL CALCULATED.3IONS-SCNC: 6 MMOL/L (ref 4–13)
BUN SERPL-MCNC: 30 MG/DL (ref 5–25)
CALCIUM SERPL-MCNC: 8.1 MG/DL (ref 8.4–10.2)
CHLORIDE SERPL-SCNC: 109 MMOL/L (ref 96–108)
CO2 SERPL-SCNC: 30 MMOL/L (ref 21–32)
CREAT SERPL-MCNC: 1.01 MG/DL (ref 0.6–1.3)
ERYTHROCYTE [DISTWIDTH] IN BLOOD BY AUTOMATED COUNT: 18 % (ref 11.6–15.1)
GFR SERPL CREATININE-BSD FRML MDRD: 50 ML/MIN/1.73SQ M
GLUCOSE SERPL-MCNC: 120 MG/DL (ref 65–140)
HCT VFR BLD AUTO: 29.2 % (ref 34.8–46.1)
HGB BLD-MCNC: 8.7 G/DL (ref 11.5–15.4)
MAGNESIUM SERPL-MCNC: 2 MG/DL (ref 1.9–2.7)
MCH RBC QN AUTO: 21.8 PG (ref 26.8–34.3)
MCHC RBC AUTO-ENTMCNC: 29.8 G/DL (ref 31.4–37.4)
MCV RBC AUTO: 73 FL (ref 82–98)
MRSA NOSE QL CULT: NORMAL
PLATELET # BLD AUTO: 300 THOUSANDS/UL (ref 149–390)
PMV BLD AUTO: 10 FL (ref 8.9–12.7)
POTASSIUM SERPL-SCNC: 4 MMOL/L (ref 3.5–5.3)
RBC # BLD AUTO: 4 MILLION/UL (ref 3.81–5.12)
SODIUM SERPL-SCNC: 145 MMOL/L (ref 135–147)
WBC # BLD AUTO: 8.35 THOUSAND/UL (ref 4.31–10.16)

## 2024-08-01 PROCEDURE — 97116 GAIT TRAINING THERAPY: CPT

## 2024-08-01 PROCEDURE — 99232 SBSQ HOSP IP/OBS MODERATE 35: CPT | Performed by: INTERNAL MEDICINE

## 2024-08-01 PROCEDURE — 83735 ASSAY OF MAGNESIUM: CPT | Performed by: INTERNAL MEDICINE

## 2024-08-01 PROCEDURE — 85027 COMPLETE CBC AUTOMATED: CPT | Performed by: INTERNAL MEDICINE

## 2024-08-01 PROCEDURE — 80048 BASIC METABOLIC PNL TOTAL CA: CPT | Performed by: INTERNAL MEDICINE

## 2024-08-01 PROCEDURE — 97530 THERAPEUTIC ACTIVITIES: CPT

## 2024-08-01 PROCEDURE — 71045 X-RAY EXAM CHEST 1 VIEW: CPT

## 2024-08-01 PROCEDURE — 99232 SBSQ HOSP IP/OBS MODERATE 35: CPT

## 2024-08-01 RX ORDER — FUROSEMIDE 10 MG/ML
40 INJECTION INTRAMUSCULAR; INTRAVENOUS
Status: DISCONTINUED | OUTPATIENT
Start: 2024-08-01 | End: 2024-08-02

## 2024-08-01 RX ADMIN — FUROSEMIDE 40 MG: 10 INJECTION, SOLUTION INTRAMUSCULAR; INTRAVENOUS at 09:34

## 2024-08-01 RX ADMIN — BUSPIRONE HYDROCHLORIDE 2.5 MG: 5 TABLET ORAL at 09:35

## 2024-08-01 RX ADMIN — HEPARIN SODIUM 5000 UNITS: 5000 INJECTION INTRAVENOUS; SUBCUTANEOUS at 05:46

## 2024-08-01 RX ADMIN — CARBAMAZEPINE 200 MG: 200 TABLET ORAL at 17:36

## 2024-08-01 RX ADMIN — LISINOPRIL 20 MG: 20 TABLET ORAL at 09:34

## 2024-08-01 RX ADMIN — APIXABAN 5 MG: 5 TABLET, FILM COATED ORAL at 17:36

## 2024-08-01 RX ADMIN — FUROSEMIDE 40 MG: 10 INJECTION, SOLUTION INTRAMUSCULAR; INTRAVENOUS at 14:41

## 2024-08-01 RX ADMIN — PANTOPRAZOLE SODIUM 20 MG: 20 TABLET, DELAYED RELEASE ORAL at 05:46

## 2024-08-01 RX ADMIN — ACETAMINOPHEN 975 MG: 325 TABLET, FILM COATED ORAL at 05:46

## 2024-08-01 RX ADMIN — FERROUS SULFATE TAB 325 MG (65 MG ELEMENTAL FE) 325 MG: 325 (65 FE) TAB at 09:34

## 2024-08-01 RX ADMIN — METOPROLOL SUCCINATE 100 MG: 100 TABLET, EXTENDED RELEASE ORAL at 17:36

## 2024-08-01 RX ADMIN — AMLODIPINE BESYLATE 5 MG: 5 TABLET ORAL at 09:35

## 2024-08-01 RX ADMIN — HEPARIN SODIUM 5000 UNITS: 5000 INJECTION INTRAVENOUS; SUBCUTANEOUS at 14:41

## 2024-08-01 RX ADMIN — BUSPIRONE HYDROCHLORIDE 2.5 MG: 5 TABLET ORAL at 20:03

## 2024-08-01 RX ADMIN — SERTRALINE HYDROCHLORIDE 25 MG: 25 TABLET ORAL at 09:35

## 2024-08-01 RX ADMIN — MELATONIN 6 MG: 3 TAB ORAL at 20:03

## 2024-08-01 RX ADMIN — METOPROLOL SUCCINATE 100 MG: 100 TABLET, EXTENDED RELEASE ORAL at 09:34

## 2024-08-01 RX ADMIN — ASPIRIN 81 MG: 81 TABLET, COATED ORAL at 09:34

## 2024-08-01 RX ADMIN — ATORVASTATIN CALCIUM 80 MG: 80 TABLET, FILM COATED ORAL at 17:36

## 2024-08-01 RX ADMIN — FUROSEMIDE 40 MG: 10 INJECTION, SOLUTION INTRAMUSCULAR; INTRAVENOUS at 19:55

## 2024-08-01 RX ADMIN — ACETAMINOPHEN 975 MG: 325 TABLET, FILM COATED ORAL at 14:41

## 2024-08-01 RX ADMIN — CARBAMAZEPINE 200 MG: 200 TABLET ORAL at 09:35

## 2024-08-01 NOTE — PROGRESS NOTES
Progress Note - Geriatric Medicine   Adrianna TOPHER Macias 89 y.o. female MRN: [id number]  Unit/Bed#: E2 -01 Encounter: [id number]      Assessment/Plan:    Acute Metabolic Encephalopathy  Presented to the hospital for falls and altered mental status  Baseline mentation per the facility is alert and oriented to person versus person and place (they do note that she does tend to wax and wane throughout the day)  Current cause considerations   Suspected to be multifactorial secondary to dementia, suspected UTI, frequent falls, antibiotic use, lorazepam use, anemia, sleep disturbances, and hospitalization  UA on admission positive for large blood, large leukocytes, innumerable RBC and WBC, and moderate bacteria  Urine culture revealed > 100,000 cFu Corynebacterium urealyticum and 40-49,000 cFu E Coli  No leukocytosis noted on labs today   Hemoglobin on labs today noted to be 8.7  Patient is alert and awake eating lunch on my exam today   She is extremely pleasant, calm, and cooperative   She is oriented to person and place ([facility])  She notes the month to be June and does not recall the year   Nursing notes no acute issues or events overnight   Patient did not require any prn medication   She was just taken off her 1:1 supervision this morning and has been doing well   Nursing notes she was out of bed and ambulating appropriately to the bathroom this morning   Suspect that confusion and agitation was related to lorazepam which has been discontinued (she is on ativan gel as needed at baseline and not oral ativan)  Identify and treat reversible causes of confusion including infection, dehydration, electrolyte imbalance, anemia, hypoxia, urinary retention, constipation, pain, and sleep disturbance  Maintain delirium precautions   Provide redirection, reorientation, and distraction techniques  Maintain fall and safety precautions   Assist with ADLs/IADLs  Avoid deliriogenic medications such as  tramadol, benzodiazepines, anticholinergics, benadryl  Treat pain using geriatric pain protocol   Encourage oral hydration and nutrition   Monitor for constipation and urinary retention   Implement sleep hygiene and limit night time interuptions   Maintain sleep-wake cycle   Encourage early and frequent mobilization   Encourage participation in group activities  Most recent EKG on 7/28/2024 revealed a QTc interval of 409  May want to avoid antipsychotics for acute agitation behaviors as these medications lower the seizure threshold and the patient does have a seizure history  If all other interventions are unsuccessful for acute agitation behaviors, can consider Depakote 125 mg once (may use IV Depacon if patient is not taking oral medication)  Would monitor on this medication as Depakote can increase the effects of Tegretol  Would avoid benzodiazepines such as Ativan as these can worsen delirium   Redirect and reorient as needed  Keep mentally, physically, and socially active    Vascular Dementia Without Behavioral Disturbance, Psychotic Disturbance, and Mood Disturbance   Patient has a known documented history  Baseline mentation per the facility he is oriented to person versus person and place (mentation waxes and wanes at baseline)  Facility notes that she is impulsive and does have occasional behaviors  Patient is alert and awake eating lunch on my exam today   She is extremely pleasant, calm, and cooperative   She is oriented to person and place ([facility])  She notes the month to be June and does not recall the year   Nursing notes no acute issues or events overnight   She is off her 1:1 and appears to be doing well   Most recent TSH on 7/29/2024 noted to be 1.398  Most recent vitamin B12 level on 7/29/2024 noted to be 188  Recommend supplementing with a goal vitamin B12 level > 400  CT of the head on 7/28/2024 revealed moderate chronic microvascular ischemic changes  Patient scored  19/30 on MoCA on 6/20/2019  Maintain delirium precautions as discussed above  Redirect and reorient as needed  Keep physically, mentally, and socially active    Deconditioning   Patient is at increased risk for deconditioning secondary to dementia, UTI, frequent falls, antibiotic use, lorazepam use, anemia, sleep disturbances, weakness, gait dysfunction, and hospitalization  Continue to optimize diet, hydration, and mobility for healing   GFR 50 on labs today  Patient has no documented history of CKD  Baseline creatinine unclear  Avoid nephrotoxic medication renal dose medication  Keep hydrated  Type II Diabetes   Most recent hemoglobin A1c on 9/26/2022 noted to be 6.7  Patient is not on any medication for this at baseline   Patient is not currently on blood sugar checks  Glucose on labs this morning noted to be 120  Consider diabetic diet  Avoid hypoglycemia  Anemia   Baseline hemoglobin unclear  Hemoglobin on labs today noted to be 8.7  Patient has had a few recent falls and is noted to have facial ecchymosis and scalp hematoma  No active signs of bleeding noted on exam today  Continue to monitor CBC  Transfuse for hemoglobin < 7   Monitor for signs and symptoms of infection, dehydration, DVT, and skin breakdown    Frailty   Clinical Frail Scale: 6- Moderately Frail  Need help with all outside activities  Need help with stairs and bathing  May need assistance with dressing  Most recent albumin on 7/9/2024 noted to be 3.7  Consider nutrition consult  Encourage protein supplementation     Ambulatory Dysfunction/Falls  Facility notes the patient has had 2 recent falls  She has been ambulating with a roller walker at baseline  PT/OT consulted to assist with strengthening/mobility and assist with discharge planning to appropriate level of care  Assess patient frequently for physical needs, encourage use of assistant devices as needed and directed by PT/OT  Identify cognitive and physical deficits and behaviors that  affect risk of falls  Consider moving patient closer to nursing station to monitor more closely for impulsive behavior which may increase risk of falls  [place] fall precautions   Educate patient/family on patient safety including physical limitations and importance of using call bell for assistance   Modify environment to reduce risk of injury including disconnecting from pole when not in use, ensuring adequate lighting in room and restroom, ensuring that path to restroom is clear and free of trip hazards  Out of bed as tolerated     Dentition/Appetite   Facility is unsure if patient wears dentures  She does have a good appetite at baseline  Patient consumed 100% of breakfast this morning   Ensure meal consistency is appropriate for all abilities   Consider nutrition consult   Continue aspiration precautions     Elimination   Facility notes the patient was primarily continent of bowel and bladder up until recently  She will ambulate herself independently to the bathroom when she had to go  Patient has recently been incontinent  She has been incontinent of bowel and bladder while inpatient  Last documented bowel movement was on 7/29  Patient is currently on ferrous sulfate which increases the risk of constipation  She is not currently on a bowel regimen  Consider starting Senna-S 8.6-50 mg BID if no bowel movement in the next 24 hours   Monitor for constipation and urinary retention     Insomnia   Facility notes that recently the patient had been having difficulty sleeping  They note that she was asleep for 20 minutes and then will get up and ambulate the [place]  Patient is on melatonin at bedtime at her facility   Patient has not been sleeping well here  She had been on lorazepam at bedtime  PDMP reviewed and appears the patient has been on this since at least June 2023  Per facility records, patient on lorazepam gel for anxiety as needed and not oral lorazepam  Oral lorazepam discontinued yesterday and melatonin  6 mg at bedtime ordered   Nursing notes no acute issues or events overnight   Continue with melatonin scheduled at bedtime   First line is behavioral therapy   Avoid sedative hypnotics including benzodiazepines and benadryl  Encourage staying awake during the day   Encourage daytime activities and morning exercise   Decrease or eliminate daytime naps   Avoid caffeine especially during late afternoon and evening hours  Establish a nighttime routine  Implement sleep hygiene and limit nighttime interruptions    Anxiety/Depression  Patient has a documented history of anxiety and depression  She is on lorazepam 0.5 mg daily at bedtime while inpatient   Per review of facility records she is on lorazepam gel Q 6 hours prn   Lorazepam discontinued yesterday and patient has been doing well since discontinuation   Patient remains on sertraline   Mood appears stable on exam today   Continue supportive care     Urinary Tract Infection  Patient presented to hospital with altered mental status  UA on admission positive for large blood, large leukocytes, innumerable WBCs and RBCs, and moderate bacteria  Urine culture revealed > 100,000 cFu Corynebacterium urealyticum and 40-49,000 cFu E Coli  Monitor for urinary retention  Management per primary team    History of CVA  Patient with history of embolic CVA  She is on aspirin and statin at baseline  Per facility records, patient is on Eliquis 5 mg BID   Eliquis currently on hold for now  If patient is on this may need to consider risk versus benefit secondary to increased falls and significant facial bruising status post falls  Management per primary team      Subjective:   The patient is being seen and evaluated today at the bedside for geriatric follow-up.  She is noted be lying in bed comfortably in no acute distress.  She is alert, awake, calm, and cooperative today. She is noted to be eating lunch. She is oriented to person and place ([facility]). She  "notes the month to be June and she does not recall the year. She is currently denying pain. She offers no acute complaints.     Care was coordinated with patients nurse Gabby. She notes no acute issues or events overnight. She states the patient is now off her 1:1 sitter and has been doing well. She states she did ambulate the patient to the bathroom assist x 1 earlier and she was able to appropriately wipe herself. She notes significant improvement in mentation and mood.     Care was also coordinated with Dr. Lainez with internal medicine.       Review of Systems   Unable to perform ROS: Dementia       Objective:     Vitals: Blood pressure 139/86, pulse (!) 111, temperature 97.9 °F (36.6 °C), temperature source Temporal, resp. rate 18, height 5' 2\" (1.575 m), weight 70.3 kg (155 lb), SpO2 96%.,Body mass index is 28.35 kg/m².    No intake or output data in the 24 hours ending 08/01/24 1224    Current Medications: Reviewed    Physical Exam:   Physical Exam  Vitals and nursing note reviewed.   Constitutional:       General: She is not in acute distress.     Appearance: She is not ill-appearing.   HENT:      Head: Normocephalic.      Mouth/Throat:      Mouth: Mucous membranes are moist.   Eyes:      General: No scleral icterus.     Conjunctiva/sclera: Conjunctivae normal.   Cardiovascular:      Rate and Rhythm: Tachycardia present. Rhythm irregular.   Pulmonary:      Effort: Pulmonary effort is normal. No respiratory distress.   Abdominal:      General: Bowel sounds are normal. There is no distension.      Palpations: Abdomen is soft.      Tenderness: There is no abdominal tenderness.   Musculoskeletal:         General: No tenderness.      Right lower leg: No edema.      Left lower leg: No edema.   Skin:     General: Skin is warm and dry.      Findings: Bruising (facial and left hip) present.   Neurological:      General: No focal deficit present.      Mental Status: She is alert. Mental status is at baseline.      " "Motor: Weakness present.      Gait: Gait abnormal.      Comments: Oriented to person and place  Disoriented to time  Hx of dementia          Invasive Devices       Peripheral Intravenous Line  Duration             Peripheral IV 07/28/24 Left;Proximal;Ventral (anterior) Forearm 3 days                    Lab, Imaging and other studies: I have personally reviewed pertinent reports.      Please note:  Voice-recognition software may have been used in the preparation of this document.  Occasional wrong word or \"sound-alike\" substitutions may have occurred due to the inherent limitations of voice recognition software.  Interpretation should be guided by context.    "

## 2024-08-01 NOTE — ASSESSMENT & PLAN NOTE
Patient presents with atrial flutter with RVR.  Suspect secondary to infection  Rates improved today but still not at goal  Continue increased dose metoprolol succinate 100 mg BID plus prn IV lopressor   Continue to monitor telemetry  Appreciate cardiology recommendations

## 2024-08-01 NOTE — PROGRESS NOTES
Continue Lasix 40 mg IV 3 times daily  Strict I's/O's and daily weights  Cardiology Progress Note   MD Wilson Richardson MD, [facility]  Jordi Vela DO, [facility]  MD Marry Rivera DO, [facility]  Cipriano Oswald DO, [facility]  ----------------------------------------------------------------  [id number] [place] [address]    Adrianna Macias 89 y.o. 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  After discussing, we have discussed the risks we have extensively discussed the risks and benefits of full anticoagulation.  Given the recent falls.  There is no intracranial hemorrhage and the patient has a high [id number] score  At this time, they have elected to reinitiate anticoagulation with Eliquis 5 mg twice daily  Although the patient has evidence of severe mitral and tricuspid regurgitation, patient's son feels strongly against going for any surgical intervention which I believe is reasonable  Family is planning for palliative care following discharge  Increase Lasix to 40 mg IV 3 times daily  Strict I's/O's and daily weights  Keep potassium greater than 4 magnesium greater than 2; repletion as per primary team  Continue amlodipine, metoprolol, aspirin and high intensity  "statin  Will continue with rate control for management of AF given patient's age and cognitive status  Poor long-term prognosis    Signed: Jordi Vela DO, FACC, FASE, FASNC, FACP      History of Present Illness:  Patient seen and examined.  Patient remains on a one-to-one.  She is confused this morning and not answering questions appropriately.  Resting in bed comfortably.      Review of Systems:  Review of systems unobtainable secondary to mental status.    Allergies   Allergen Reactions    Citalopram      Other reaction(s): Other (See Comments)  insomnia    Dextrose 5%-Electrolyte #48     Levofloxacin      Other reaction(s): Other (See Comments)  \"feels lousey\"    Morphine Other (See Comments)     unknown    Nitrofurantoin Other (See Comments)     diarrhea    Oxycodone Other (See Comments)     unknown    Paroxetine Other (See Comments)     nausea    Pseudoephedrine Other (See Comments)     insomnia       No current facility-administered medications on file prior to encounter.     Current Outpatient Medications on File Prior to Encounter   Medication Sig    Acetaminophen 500 MG Take 500 mg by mouth every 4 (four) hours as needed    amLODIPine (NORVASC) 5 mg tablet Take 5 mg by mouth    apixaban (ELIQUIS) 5 mg Take 5 mg by mouth 2 (two) times a day     aspirin (ECOTRIN LOW STRENGTH) 81 mg EC tablet Take 81 mg by mouth daily    atorvastatin (LIPITOR) 80 mg tablet Take 80 mg by mouth    bisacodyl (DULCOLAX) 10 mg suppository Insert 10 mg into the rectum daily as needed    busPIRone (BUSPAR) 5 mg tablet Take 2.5 mg by mouth 2 (two) times a day    calcium-vitamin D 250-100 MG-UNIT per tablet Take 1 tablet by mouth daily     carBAMazepine (CARBATROL) 200 mg 12 hr capsule TAKE ONE CAPSULE BY MOUTH TWICE DAILY    Cholecalciferol 2000 units CAPS Take 1 capsule by mouth 2 (two) times a day     glycerin adult 2 g suppository Insert 1 suppository into the rectum    hydrochlorothiazide (MICROZIDE) 12.5 mg capsule Take 12.5 mg " by mouth (Patient not taking: Reported on 8/11/2021)    lisinopril (ZESTRIL) 20 mg tablet Take 20 mg by mouth daily    LORazepam (ATIVAN) 0.5 mg tablet Take 0.5 mg by mouth daily at bedtime    Magnesium Hydroxide (MILK OF MAGNESIA PO) Take by mouth    Melatonin 5 MG TABS Take 5 mg by mouth daily at bedtime    metFORMIN (GLUCOPHAGE-XR) 500 mg 24 hr tablet  (Patient not taking: Reported on 7/9/2024)    METOPROLOL SUCCINATE ER PO Take 50 mg by mouth daily    multivitamin (THERAGRAN) TABS Take 1 tablet by mouth    omeprazole (PriLOSEC) 20 mg delayed release capsule Take 20 mg by mouth 2 (two) times a day     ondansetron (ZOFRAN) 4 mg tablet Take 4 mg by mouth daily as needed    sertraline (ZOLOFT) 25 mg tablet Take 25 mg by mouth    trimethoprim (PROLOPRIM) 100 mg tablet Take 100 mg by mouth (Patient not taking: Reported on 7/9/2024)        Current Facility-Administered Medications   Medication Dose Route Frequency Provider Last Rate    acetaminophen  975 mg Oral Q8H [facility] Sung Lainez DO      aluminum-magnesium hydroxide-simethicone  30 mL Oral Q6H PRN Mika Jackson MD      amLODIPine  5 mg Oral Daily Mika Jackson MD      aspirin  81 mg Oral Daily Mika Jackson MD      atorvastatin  80 mg Oral Daily With Dinner Mika Jackson MD      bisacodyl  10 mg Rectal Daily PRN Mika Jackson MD      busPIRone  2.5 mg Oral BID Mika Jackson MD      carBAMazepine  200 mg Oral BID Mika Jackson MD      ferrous sulfate  325 mg Oral Daily With Breakfast Sung Lainez DO      furosemide  40 mg Intravenous BID (diuretic) Sung Lainez DO      heparin (porcine)  5,000 Units Subcutaneous Q8H [facility] Sung Lainez DO      lisinopril  20 mg Oral Daily Mika Jackson MD      melatonin  6 mg Oral HS BRITTNEY Blair      metoprolol  5 mg Intravenous Q4H PRN Sung Lainez DO      metoprolol succinate  100 mg Oral BID Jordi Vela,       OLANZapine  2.5 mg Intramuscular Q8H PRN Sung Lainez  "DO      ondansetron  4 mg Intravenous Q6H PRN Mika Jackson MD      pantoprazole  20 mg Oral Daily Before Breakfast Mika Jackson MD      sertraline  25 mg Oral Daily Mika Jackson MD              Vitals:    07/31/24 1557 07/31/24 2120 08/01/24 0224 08/01/24 0735   BP: 135/90 130/90 136/80 107/56   BP Location: Left arm Left arm Left arm Right arm   Pulse: (!) 118 (!) 111 (!) 114 94   Resp: 20 22 21 18   Temp: 97.6 °F (36.4 °C) 97.6 °F (36.4 °C) 97.8 °F (36.6 °C) 97.9 °F (36.6 °C)   TempSrc: Temporal Temporal Temporal Temporal   SpO2: 96% 96% 95% 96%   Weight:       Height:         Body mass index is 28.35 kg/m².    No intake or output data in the 24 hours ending 08/01/24 1026    Weight change:     PHYSICAL EXAMINATION:  Gen: Awake, Alert, NAD  Head/eyes: Significant facial ecchymoses, pupils equal and round, Anicteric  ENT: mmm  Neck: Supple, No elevated JVP, trachea midline  Resp: CTA bilaterally no w/r/r  CV: irreg irreg +S1, S2, No m/r/g  Abd: Soft, NT/ND + BS  Ext: no LE edema bilaterally  Neuro: Follows commands, moves all extermities  Psych: Blunted affect, confused mood, pleasant attitude, non-combative  Skin: warm; no rash, erythema or venous stasis changes on exposed skin    Lab Results:  Results from last 7 days   Lab Units 08/01/24  0545   WBC Thousand/uL 8.35   HEMOGLOBIN g/dL 8.7*   HEMATOCRIT % 29.2*   PLATELETS Thousands/uL 300     Results from last 7 days   Lab Units 08/01/24  0545   POTASSIUM mmol/L 4.0   CHLORIDE mmol/L 109*   CO2 mmol/L 30   BUN mg/dL 30*   CREATININE mg/dL 1.01   CALCIUM mg/dL 8.1*     No results found for: \"TROPONINT\"                Tele: AF w/ RVR 80s to 100s    This note was completed in part utilizing M-Modal Fluency Direct Software.  Grammatical errors, random word insertions, spelling mistakes, and incomplete sentences may be an occasional consequence of this system secondary to software limitations, ambient noise, and hardware issues.  If you have any questions or " concerns about the content, text, or information contained within the body of this dictation, please contact the provider for clarification.

## 2024-08-01 NOTE — ASSESSMENT & PLAN NOTE
Wt Readings from Last 3 Encounters:   07/30/24 70.3 kg (155 lb)   11/09/22 71.8 kg (158 lb 6.4 oz)   08/11/21 77.7 kg (171 lb 3.2 oz)     Chest x-ray with pleural effusions as well as pulmonary edema  EF 70%, severely dilated bilateral atrium, severe mitral valve regurgitation, severe tricuspid valve regurgitation  Chest x-ray today with persistent pulmonary edema, increase Lasix to 40 IV 3 times daily  Appreciate cardiology recommendations

## 2024-08-01 NOTE — PHYSICAL THERAPY NOTE
Physical Therapy Treatment Note     08/01/24 1359   PT Last Visit   PT Visit Date 08/01/24   Note Type   Note Type Treatment   Pain Assessment   Pain Assessment Tool 0-10   Pain Score No Pain   Restrictions/Precautions   Weight Bearing Precautions Per Order No   Other Precautions Bed Alarm;Cognitive;Fall Risk;Pain   General   Chart Reviewed Yes   Family/Caregiver Present No   Subjective   Subjective Pt. agreeable to PT   Bed Mobility   Sit to Supine 4  Minimal assistance   Additional items Assist x 1;LE management;Increased time required   Transfers   Sit to Stand 4  Minimal assistance   Additional items Assist x 1;Increased time required;Verbal cues;Armrests   Stand to Sit 4  Minimal assistance   Additional items Assist x 1;Increased time required;Verbal cues;Armrests   Stand pivot 4  Minimal assistance   Additional items Assist x 1;Increased time required;Verbal cues   Toilet transfer 4  Minimal assistance   Additional items Assist x 2;Increased time required;Commode;Verbal cues;Armrests   Ambulation/Elevation   Gait pattern Improper Weight shift;Narrow MARYLU;Forward Flexion;Decreased foot clearance;Inconsistent janna;Foward flexed;Short stride;Excessively slow;Decreased toe off;Decreased heel strike;Knees flexed   Gait Assistance 4  Minimal assist   Additional items Assist x 1;Assist x 2;Verbal cues;Other (Comment)  (2nd A for xcahir follow)   Assistive Device Rolling walker   Distance 2ft x 2, 50ft, 110ft   Balance   Static Sitting Good   Dynamic Sitting Fair   Static Standing Fair   Dynamic Standing Fair -   Ambulatory Fair -   Endurance Deficit   Endurance Deficit Yes   Endurance Deficit Description fatigue   Activity Tolerance   Activity Tolerance Patient tolerated treatment well   Nurse Made Aware yes   Exercises   TKR Sitting;10 reps;Bilateral;AAROM   Assessment   Prognosis Fair   Problem List Decreased strength;Decreased range of motion;Decreased endurance;Impaired balance;Decreased mobility;Decreased  coordination;Impaired judgement;Decreased cognition   Assessment Pt. needed 100% cues for hand placement and for techqniues for transfers. Cues for postural correction and increasing her stride and step length during ambulation given. Assistance provided for RW negotiation during amb. Pt. able to follow cues given during session. Pt. able to recall and oriented to place. Initially pt. needed step by step direction. Donned brief on prior to ambulating. Seated rests between amb. due to fatigue. Pt. noted with improved mentation this session. Pt. back in bed in supine post session with all needs within reach and bed alarm engaged. Will continue to follow epr PT POC.   Barriers to Discharge None   Goals   Patient Goals None reported   STG Expiration Date 08/13/24   PT Treatment Day 1   Plan   Treatment/Interventions Functional transfer training;LE strengthening/ROM;Therapeutic exercise;Gait training;Equipment eval/education;Bed mobility;Patient/family training;Cognitive reorientation;Spoke to nursing;Spoke to case management   Progress Progressing toward goals   PT Frequency 1-2x/wk   Discharge Recommendation   Rehab Resource Intensity Level, PT II (Moderate Resource Intensity)   Equipment Recommended Walker   AM-PAC Basic Mobility Inpatient   Turning in Flat Bed Without Bedrails 3   Lying on Back to Sitting on Edge of Flat Bed Without Bedrails 3   Moving Bed to Chair 3   Standing Up From Chair Using Arms 3   Walk in Room 3   Climb 3-5 Stairs With Railing 2   Basic Mobility Inpatient Raw Score 17   Basic Mobility Standardized Score 39.67   Turning Head Towards Sound 4   Follow Simple Instructions 4   Low Function Basic Mobility Raw Score  25   Low Function Basic Mobility Standardized Score  39.85   [facility] Highest Level Of Mobility   -HL Goal 5: Stand one or more mins   -HL Achieved 7: Walk 25 feet or more   End of Consult   Patient Position at End of Consult Supine;All needs within reach;Bed/Chair alarm  lobo Hunter PTA    An AM-PAC basic mobility standardized score less than 42.9 suggest the patient may benefit from discharge to post-acute rehab services.

## 2024-08-01 NOTE — PLAN OF CARE
Problem: Potential for Falls  Goal: Patient will remain free of falls  Description: INTERVENTIONS:  - Educate patient/family on patient safety including physical limitations  - Instruct patient to call for assistance with activity   - Consult OT/PT to assist with strengthening/mobility   - Keep Call bell within reach  - Keep bed low and locked with side rails adjusted as appropriate  - Keep care items and personal belongings within reach  - Initiate and maintain comfort rounds  - Make Fall Risk Sign visible to staff  - Offer Toileting every 2 Hours, in advance of need  - Initiate/Maintain bed alarm  - Obtain necessary fall risk management equipment: yellow socks, yellow bracelet, bed alarm  - Apply yellow socks and bracelet for high fall risk patients  - Consider moving patient to room near nurses station  Outcome: Progressing     Problem: Prexisting or High Potential for Compromised Skin Integrity  Goal: Skin integrity is maintained or improved  Description: INTERVENTIONS:  - Identify patients at risk for skin breakdown  - Assess and monitor skin integrity  - Assess and monitor nutrition and hydration status  - Monitor labs   - Assess for incontinence   - Turn and reposition patient  - Assist with mobility/ambulation  - Relieve pressure over bony prominences  - Avoid friction and shearing  - Provide appropriate hygiene as needed including keeping skin clean and dry  - Evaluate need for skin moisturizer/barrier cream  - Collaborate with interdisciplinary team   - Patient/family teaching  - Consider wound care consult   Outcome: Progressing     Problem: PAIN - ADULT  Goal: Verbalizes/displays adequate comfort level or baseline comfort level  Description: Interventions:  - Encourage patient to monitor pain and request assistance  - Assess pain using appropriate pain scale  - Administer analgesics based on type and severity of pain and evaluate response  - Implement non-pharmacological measures as appropriate and  evaluate response  - Consider cultural and social influences on pain and pain management  - Notify physician/advanced practitioner if interventions unsuccessful or patient reports new pain  Outcome: Progressing     Problem: INFECTION - ADULT  Goal: Absence or prevention of progression during hospitalization  Description: INTERVENTIONS:  - Assess and monitor for signs and symptoms of infection  - Monitor lab/diagnostic results  - Monitor all insertion sites, i.e. indwelling lines, tubes, and drains  - Monitor endotracheal if appropriate and nasal secretions for changes in amount and color  - [place] appropriate cooling/warming therapies per order  - Administer medications as ordered  - Instruct and encourage patient and family to use good hand hygiene technique  - Identify and instruct in appropriate isolation precautions for identified infection/condition  Outcome: Progressing     Problem: SAFETY ADULT  Goal: Patient will remain free of falls  Description: INTERVENTIONS:  - Educate patient/family on patient safety including physical limitations  - Instruct patient to call for assistance with activity   - Consult OT/PT to assist with strengthening/mobility   - Keep Call bell within reach  - Keep bed low and locked with side rails adjusted as appropriate  - Keep care items and personal belongings within reach  - Initiate and maintain comfort rounds  - Make Fall Risk Sign visible to staff  - Offer Toileting every 2 Hours, in advance of need  - Initiate/Maintain bed alarm  - Obtain necessary fall risk management equipment: yellow socks, yellow bracelet, bed alarm  - Apply yellow socks and bracelet for high fall risk patients  - Consider moving patient to room near nurses station  Outcome: Progressing     Problem: DISCHARGE PLANNING  Goal: Discharge to home or other facility with appropriate resources  Description: INTERVENTIONS:  - Identify barriers to discharge w/patient and caregiver  - Arrange for needed discharge  resources and transportation as appropriate  - Identify discharge learning needs (meds, wound care, etc.)  - Arrange for interpretive services to assist at discharge as needed  - Refer to Case Management Department for coordinating discharge planning if the patient needs post-hospital services based on physician/advanced practitioner order or complex needs related to functional status, cognitive ability, or social support system  Outcome: Progressing     Problem: Knowledge Deficit  Goal: Patient/family/caregiver demonstrates understanding of disease process, treatment plan, medications, and discharge instructions  Description: Complete learning assessment and assess knowledge base.  Interventions:  - Provide teaching at level of understanding  - Provide teaching via preferred learning methods  Outcome: Progressing     Problem: GENITOURINARY - ADULT  Goal: Maintains or returns to baseline urinary function  Description: INTERVENTIONS:  - Assess urinary function  - Encourage oral fluids to ensure adequate hydration if ordered  - Administer IV fluids as ordered to ensure adequate hydration  - Administer ordered medications as needed  - Offer frequent toileting  - Follow urinary retention protocol if ordered  Outcome: Progressing     Problem: SKIN/TISSUE INTEGRITY - ADULT  Goal: Skin Integrity remains intact(Skin Breakdown Prevention)  Description: Assess:  -Perform Willy assessment every shift  -Clean and moisturize skin  -Inspect skin when repositioning, toileting, and assisting with ADLS  -Assess under medical devices   -Assess extremities for adequate circulation and sensation     Bed Management:  -Have minimal linens on bed & keep smooth, unwrinkled  -Change linens as needed when moist or perspiring  -Avoid sitting or lying in one position for more than 2 hours while in bed  -Keep HOB at 30 degrees     Toileting:  -Offer bedside commode  -Assess for incontinence every 2 hours  -Use incontinent care products after  each incontinent episode    Activity:  -Mobilize patient 3 times a day  -Encourage activity and walks on unit  -Encourage or provide ROM exercises   -Turn and reposition patient every 2 Hours  -Use appropriate equipment to lift or move patient in bed  -Instruct/ Assist with weight shifting when out of bed in chair  -Consider limitation of chair time 1/2 hour intervals    Skin Care:  -Avoid use of baby powder, tape, friction and shearing, hot water or constrictive clothing  -Relieve pressure over bony prominences using allevyn  -Do not massage red bony areas    Next Steps:  -Teach patient strategies to minimize risks such as falls   -Consider consults to  interdisciplinary teams such as PT, OT  Outcome: Progressing     Problem: MUSCULOSKELETAL - ADULT  Goal: Maintain or return mobility to safest level of function  Description: INTERVENTIONS:  - Assess patient's ability to carry out ADLs; assess patient's baseline for ADL function and identify physical deficits which impact ability to perform ADLs (bathing, care of mouth/teeth, toileting, grooming, dressing, etc.)  - Assess/evaluate cause of self-care deficits   - Assess range of motion  - Assess patient's mobility  - Assess patient's need for assistive devices and provide as appropriate  - Encourage maximum independence but intervene and supervise when necessary  - Involve family in performance of ADLs  - Assess for home care needs following discharge   - Consider OT consult to assist with ADL evaluation and planning for discharge  - Provide patient education as appropriate  Outcome: Progressing     Problem: NEUROSENSORY - ADULT  Goal: Achieves maximal functionality and self care  Description: INTERVENTIONS  - Monitor swallowing and airway patency with patient fatigue and changes in neurological status  - Encourage and assist patient to increase activity and self care.   - Encourage visually impaired, hearing impaired and aphasic patients to use  assistive/communication devices  Outcome: Progressing     Problem: CARDIOVASCULAR - ADULT  Goal: Maintains optimal cardiac output and hemodynamic stability  Description: INTERVENTIONS:  - Monitor I/O, vital signs and rhythm  - Monitor for S/S and trends of decreased cardiac output  - Administer and titrate ordered vasoactive medications to optimize hemodynamic stability  - Assess quality of pulses, skin color and temperature  - Assess for signs of decreased coronary artery perfusion  - Instruct patient to report change in severity of symptoms  Outcome: Progressing     Problem: SAFETY,RESTRAINT: NV/NON-SELF DESTRUCTIVE BEHAVIOR  Goal: Remains free of harm/injury (restraint for non violent/non self-detsructive behavior)  Description: INTERVENTIONS:  - Instruct patient/family regarding restraint use   - Assess and monitor physiologic and psychological status   - Provide interventions and comfort measures to meet assessed patient needs   - Identify and implement measures to help patient regain control  - Assess readiness for release of restraint   Outcome: Progressing  Goal: Returns to optimal restraint-free functioning  Description: INTERVENTIONS:  - Assess the patient's behavior and symptoms that indicate continued need for restraint  - Identify and implement measures to help patient regain control  - Assess readiness for release of restraint   Outcome: Progressing     Problem: Nutrition/Hydration-ADULT  Goal: Nutrient/Hydration intake appropriate for improving, restoring or maintaining nutritional needs  Description: Monitor and assess patient's nutrition/hydration status for malnutrition. Collaborate with interdisciplinary team and initiate plan and interventions as ordered.  Monitor patient's weight and dietary intake as ordered or per policy. Utilize nutrition screening tool and intervene as necessary. Determine patient's food preferences and provide high-protein, high-caloric foods as appropriate.      INTERVENTIONS:  - Monitor oral intake, urinary output, labs, and treatment plans  - Assess nutrition and hydration status and recommend course of action  - Evaluate amount of meals eaten  - Assist patient with eating if necessary   - Allow adequate time for meals  - Recommend/ encourage appropriate diets, oral nutritional supplements, and vitamin/mineral supplements  - Order, calculate, and assess calorie counts as needed  - Recommend, monitor, and adjust tube feedings and TPN/PPN based on assessed needs  - Assess need for intravenous fluids  - Provide specific nutrition/hydration education as appropriate  - Include patient/family/caregiver in decisions related to nutrition  Outcome: Progressing

## 2024-08-01 NOTE — PLAN OF CARE
Problem: PHYSICAL THERAPY ADULT  Goal: Performs mobility at highest level of function for planned discharge setting.  See evaluation for individualized goals.  Description: Treatment/Interventions: Functional transfer training, LE strengthening/ROM, Therapeutic exercise, Endurance training, Cognitive reorientation, Patient/family training, Equipment eval/education, Bed mobility, Gait training, Compensatory technique education, Continued evaluation, Spoke to nursing, OT  Equipment Recommended: Walker (pt has)       See flowsheet documentation for full assessment, interventions and recommendations.  Outcome: Progressing  Note: Prognosis: Fair  Problem List: Decreased strength, Decreased range of motion, Decreased endurance, Impaired balance, Decreased mobility, Decreased coordination, Impaired judgement, Decreased cognition  Assessment: Pt. needed 100% cues for hand placement and for techqniues for transfers. Cues for postural correction and increasing her stride and step length during ambulation given. Assistance provided for RW negotiation during amb. Pt. able to follow cues given during session. Pt. able to recall and oriented to place. Initially pt. needed step by step direction. Donned brief on prior to ambulating. Seated rests between amb. due to fatigue. Pt. noted with improved mentation this session. Pt. back in bed in supine post session with all needs within reach and bed alarm engaged. Will continue to follow epr PT POC.  Barriers to Discharge: None     Rehab Resource Intensity Level, PT: II (Moderate Resource Intensity)    See flowsheet documentation for full assessment.

## 2024-08-01 NOTE — ASSESSMENT & PLAN NOTE
89-year-old female presented with altered mental status, falls  Presented from nursing home, history of dementia with confusion at baseline.  History of multiple falls in the past  Suspect secondary to UTI, hospital delirium   Appreciate geriatrics recommendations  Delirium precautions  Much improved status, conversing was alert and oriented x 2 for me.  Continue to monitor off benzodiazepines  PT/OT-likely return to facility when mental status improves

## 2024-08-01 NOTE — PROGRESS NOTES
[facility]  Progress Note  Name: Adrianna Macias I  MRN: [id number]  Unit/Bed#: E2 -01 I Date of Admission: 7/28/2024   Date of Service: 8/1/2024 I Hospital Day: 4    Assessment & Plan   * Metabolic encephalopathy  Assessment & Plan  89-year-old female presented with altered mental status, falls  Presented from nursing home, history of dementia with confusion at baseline.  History of multiple falls in the past  Suspect secondary to UTI, hospital delirium   Appreciate geriatrics recommendations  Delirium precautions  Much improved status, conversing was alert and oriented x 2 for me.  Continue to monitor off benzodiazepines  PT/OT-likely return to facility when mental status improves    Acute diastolic heart failure (HCC)  Assessment & Plan  Wt Readings from Last 3 Encounters:   07/30/24 70.3 kg (155 lb)   11/09/22 71.8 kg (158 lb 6.4 oz)   08/11/21 77.7 kg (171 lb 3.2 oz)     Chest x-ray with pleural effusions as well as pulmonary edema  EF 70%, severely dilated bilateral atrium, severe mitral valve regurgitation, severe tricuspid valve regurgitation  Chest x-ray today with persistent pulmonary edema, increase Lasix to 40 IV 3 times daily  Appreciate cardiology recommendations        Fall  Assessment & Plan  Presented with multiple falls   Recent ED visit for fall   PT/OT   CM for assistance with placement on discharge     Atrial fibrillation (HCC)  Assessment & Plan  Patient presents with atrial flutter with RVR.  Suspect secondary to infection  Rates improved today but still not at goal  Continue increased dose metoprolol succinate 100 mg BID plus prn IV lopressor   Continue to monitor telemetry  Appreciate cardiology recommendations    Anemia  Assessment & Plan  Hemoglobin 7.8 on admission, previous baseline appears to be 9-10  Recent fall with multiple facial ecchymosis, scalp hematoma  Remained stable, no signs of active bleeding  B12, folic acid within normal limits  Continue  iron tablets     UTI (urinary tract infection)  Assessment & Plan  UA concerning for infection, reporting urinary frequency and incontinence  Cultures return  Patient completed adequate course of IV Rocephin    Hypertension  Assessment & Plan  Continue metoprolol, amlodipine, lisinopril    History of CVA (cerebrovascular accident)  Assessment & Plan  History of embolic CVA, continue aspirin, statin,  Given frequent falls, may need to weigh risk benefits of continuing Eliquis.  Follow-up with PCP.  Given high [id number] score, will continue for now    History of seizure  Assessment & Plan  Continue Tegretol 200 mg twice daily         VTE Pharmacologic Prophylaxis: heparin     Patient Centered Rounds:  Patient care rounds were performed with nursing    Time Spent for Care: I have spent a total time of 45 minutes on 24 in caring for this patient including Diagnostic results, Risks and benefits of tx options, Instructions for management, Patient and family education, Importance of tx compliance, Impressions, Counseling / Coordination of care, Documenting in the medical record, Reviewing / ordering tests, medicine, procedures  , and Communicating with other healthcare professionals .      Current Length of Stay: 4 day(s)    Current Patient Status: Inpatient   Certification Statement: The patient will continue to require additional inpatient hospital stay due to need for IV diuretics, management of afib and encephalopathy     Discharge Plan: Likely dc 48 hours     Code Status: Level 3 - DNAR and DNI      Subjective:   Patient seen and evaluated at bedside. No overnight events. Much improved mental status.     Objective:     Vitals:   Temp (24hrs), Av.8 °F (36.6 °C), Min:97.6 °F (36.4 °C), Max:97.9 °F (36.6 °C)    Temp:  [97.6 °F (36.4 °C)-97.9 °F (36.6 °C)] 97.9 °F (36.6 °C)  HR:  [] 111  Resp:  [18-22] 18  BP: (107-139)/(56-90) 139/86  SpO2:  [95 %-96 %] 96 %  Body mass index is 28.35 kg/m².     Input  and Output Summary (last 24 hours):       Intake/Output Summary (Last 24 hours) at 8/1/2024 1413  Last data filed at 8/1/2024 1330  Gross per 24 hour   Intake 240 ml   Output 1002 ml   Net -762 ml       Physical Exam:     Physical Exam  Vitals reviewed.   Constitutional:       General: She is not in acute distress.     Appearance: She is well-developed. She is not ill-appearing, toxic-appearing or diaphoretic.   HENT:      Head: Normocephalic and atraumatic.      Mouth/Throat:      Mouth: Mucous membranes are moist.   Eyes:      General: No scleral icterus.     Extraocular Movements: Extraocular movements intact.   Cardiovascular:      Rate and Rhythm: Normal rate and regular rhythm.      Heart sounds: Normal heart sounds.   Pulmonary:      Effort: Pulmonary effort is normal. No respiratory distress.      Breath sounds: Rales present. No wheezing.   Abdominal:      General: There is no distension.      Palpations: Abdomen is soft.      Tenderness: There is no abdominal tenderness. There is no guarding or rebound.   Musculoskeletal:         General: No swelling, tenderness or deformity.   Skin:     General: Skin is warm and dry.   Neurological:      Mental Status: She is alert.      Comments: Alert and oriented x 2    Psychiatric:         Mood and Affect: Mood normal.         Behavior: Behavior normal.         Thought Content: Thought content normal.         Judgment: Judgment normal.         Additional Data:     Labs: I have reviewed pertinent results     Results from last 7 days   Lab Units 08/01/24  0545 07/30/24  0512 07/29/24  0554   WBC Thousand/uL 8.35   < > 8.99   HEMOGLOBIN g/dL 8.7*   < > 7.8*   HEMATOCRIT % 29.2*   < > 26.7*   PLATELETS Thousands/uL 300   < > 262   SEGS PCT %  --   --  76*   LYMPHO PCT %  --   --  13*   MONO PCT %  --   --  11   EOS PCT %  --   --  0    < > = values in this interval not displayed.     Results from last 7 days   Lab Units 08/01/24  0545   SODIUM mmol/L 145   POTASSIUM  mmol/L 4.0   CHLORIDE mmol/L 109*   CO2 mmol/L 30   BUN mg/dL 30*   CREATININE mg/dL 1.01   ANION GAP mmol/L 6   CALCIUM mg/dL 8.1*   GLUCOSE RANDOM mg/dL 120         Results from last 7 days   Lab Units 07/30/24  2027   POC GLUCOSE mg/dl 148*                 Imaging: I have reviewed pertinent imaging       Recent Cultures (last 7 days):     Results from last 7 days   Lab Units 07/28/24  1332   URINE CULTURE  >100,000 cfu/ml Corynebacterium urealyticum*  40,000-49,000 cfu/ml Escherichia coli*  10,000-19,000 cfu/ml       Last 24 Hours Medication List:   Current Facility-Administered Medications   Medication Dose Route Frequency Provider Last Rate    acetaminophen  975 mg Oral Q8H [facility] Sung Lainez DO      aluminum-magnesium hydroxide-simethicone  30 mL Oral Q6H PRN Mika Jackson MD      amLODIPine  5 mg Oral Daily Mika Jackson MD      aspirin  81 mg Oral Daily Mika Jackson MD      atorvastatin  80 mg Oral Daily With Dinner Mika Jackson MD      bisacodyl  10 mg Rectal Daily PRN Mika Jackson MD      busPIRone  2.5 mg Oral BID Mika Jackson MD      carBAMazepine  200 mg Oral BID Mika Jackson MD      ferrous sulfate  325 mg Oral Daily With Breakfast Sung Lainez DO      furosemide  40 mg Intravenous TID (diuretic) Sung Lainez DO      heparin (porcine)  5,000 Units Subcutaneous Q8H [facility] Sung Lainez DO      lisinopril  20 mg Oral Daily Mika Jackson MD      melatonin  6 mg Oral HS BRITTNEY Blair      metoprolol  5 mg Intravenous Q4H PRN Sung Lainez DO      metoprolol succinate  100 mg Oral BID Jordi Vela DO      OLANZapine  2.5 mg Intramuscular Q8H PRN Sung Lainez DO      ondansetron  4 mg Intravenous Q6H PRN Mika Jackson MD      pantoprazole  20 mg Oral Daily Before Breakfast Mika Jackson MD      sertraline  25 mg Oral Daily Mika Jackson MD          Today, Patient Was Seen By: Sung Lainez DO    ** Please Note: Dictation voice to  text software may have been used in the creation of this document. **

## 2024-08-01 NOTE — PLAN OF CARE
Problem: Potential for Falls  Goal: Patient will remain free of falls  Description: INTERVENTIONS:  - Educate patient/family on patient safety including physical limitations  - Instruct patient to call for assistance with activity   - Consult OT/PT to assist with strengthening/mobility   - Keep Call bell within reach  - Keep bed low and locked with side rails adjusted as appropriate  - Keep care items and personal belongings within reach  - Initiate and maintain comfort rounds  - Make Fall Risk Sign visible to staff  - Offer Toileting every 2 Hours, in advance of need  - Initiate/Maintain bed alarm  - Obtain necessary fall risk management equipment: yellow socks, yellow bracelet, bed alarm  - Apply yellow socks and bracelet for high fall risk patients  - Consider moving patient to room near nurses station  Outcome: Progressing     Problem: CARDIOVASCULAR - ADULT  Goal: Maintains optimal cardiac output and hemodynamic stability  Description: INTERVENTIONS:  - Monitor I/O, vital signs and rhythm  - Monitor for S/S and trends of decreased cardiac output  - Administer and titrate ordered vasoactive medications to optimize hemodynamic stability  - Assess quality of pulses, skin color and temperature  - Assess for signs of decreased coronary artery perfusion  - Instruct patient to report change in severity of symptoms  Outcome: Progressing     Problem: SKIN/TISSUE INTEGRITY - ADULT  Goal: Skin Integrity remains intact(Skin Breakdown Prevention)  Description: Assess:  -Perform Willy assessment every shift  -Clean and moisturize skin  -Inspect skin when repositioning, toileting, and assisting with ADLS  -Assess under medical devices   -Assess extremities for adequate circulation and sensation     Bed Management:  -Have minimal linens on bed & keep smooth, unwrinkled  -Change linens as needed when moist or perspiring  -Avoid sitting or lying in one position for more than 2 hours while in bed  -Keep HOB at 30 degrees      Toileting:  -Offer bedside commode  -Assess for incontinence every 2 hours  -Use incontinent care products after each incontinent episode    Activity:  -Mobilize patient 3 times a day  -Encourage activity and walks on unit  -Encourage or provide ROM exercises   -Turn and reposition patient every 2 Hours  -Use appropriate equipment to lift or move patient in bed  -Instruct/ Assist with weight shifting when out of bed in chair  -Consider limitation of chair time 1/2 hour intervals    Skin Care:  -Avoid use of baby powder, tape, friction and shearing, hot water or constrictive clothing  -Relieve pressure over bony prominences using allevyn  -Do not massage red bony areas    Next Steps:  -Teach patient strategies to minimize risks such as falls   -Consider consults to  interdisciplinary teams such as PT, OT  Outcome: Progressing

## 2024-08-01 NOTE — ASSESSMENT & PLAN NOTE
History of embolic CVA, continue aspirin, statin,  Given frequent falls, may need to weigh risk benefits of continuing Eliquis.  Follow-up with PCP.  Given high [id number] score, will continue for now

## 2024-08-02 ENCOUNTER — APPOINTMENT (INPATIENT)
Dept: RADIOLOGY | Facility: HOSPITAL | Age: 89
DRG: 070 | End: 2024-08-02
Payer: MEDICARE

## 2024-08-02 LAB
ANION GAP SERPL CALCULATED.3IONS-SCNC: 5 MMOL/L (ref 4–13)
BUN SERPL-MCNC: 30 MG/DL (ref 5–25)
CALCIUM SERPL-MCNC: 8.1 MG/DL (ref 8.4–10.2)
CHLORIDE SERPL-SCNC: 103 MMOL/L (ref 96–108)
CO2 SERPL-SCNC: 34 MMOL/L (ref 21–32)
CREAT SERPL-MCNC: 1.15 MG/DL (ref 0.6–1.3)
ERYTHROCYTE [DISTWIDTH] IN BLOOD BY AUTOMATED COUNT: 18.1 % (ref 11.6–15.1)
GFR SERPL CREATININE-BSD FRML MDRD: 42 ML/MIN/1.73SQ M
GLUCOSE SERPL-MCNC: 195 MG/DL (ref 65–140)
HCT VFR BLD AUTO: 29.8 % (ref 34.8–46.1)
HGB BLD-MCNC: 8.7 G/DL (ref 11.5–15.4)
MAGNESIUM SERPL-MCNC: 1.7 MG/DL (ref 1.9–2.7)
MCH RBC QN AUTO: 21.5 PG (ref 26.8–34.3)
MCHC RBC AUTO-ENTMCNC: 29.2 G/DL (ref 31.4–37.4)
MCV RBC AUTO: 74 FL (ref 82–98)
PLATELET # BLD AUTO: 313 THOUSANDS/UL (ref 149–390)
PMV BLD AUTO: 9.8 FL (ref 8.9–12.7)
POTASSIUM SERPL-SCNC: 3.2 MMOL/L (ref 3.5–5.3)
RBC # BLD AUTO: 4.04 MILLION/UL (ref 3.81–5.12)
SODIUM SERPL-SCNC: 142 MMOL/L (ref 135–147)
WBC # BLD AUTO: 7.05 THOUSAND/UL (ref 4.31–10.16)

## 2024-08-02 PROCEDURE — 99232 SBSQ HOSP IP/OBS MODERATE 35: CPT

## 2024-08-02 PROCEDURE — 85027 COMPLETE CBC AUTOMATED: CPT | Performed by: INTERNAL MEDICINE

## 2024-08-02 PROCEDURE — 83735 ASSAY OF MAGNESIUM: CPT | Performed by: INTERNAL MEDICINE

## 2024-08-02 PROCEDURE — 80048 BASIC METABOLIC PNL TOTAL CA: CPT | Performed by: INTERNAL MEDICINE

## 2024-08-02 PROCEDURE — 99232 SBSQ HOSP IP/OBS MODERATE 35: CPT | Performed by: INTERNAL MEDICINE

## 2024-08-02 PROCEDURE — 71045 X-RAY EXAM CHEST 1 VIEW: CPT

## 2024-08-02 RX ORDER — MAGNESIUM SULFATE HEPTAHYDRATE 40 MG/ML
2 INJECTION, SOLUTION INTRAVENOUS ONCE
Status: COMPLETED | OUTPATIENT
Start: 2024-08-02 | End: 2024-08-03

## 2024-08-02 RX ORDER — MIDODRINE HYDROCHLORIDE 2.5 MG/1
2.5 TABLET ORAL
Status: DISCONTINUED | OUTPATIENT
Start: 2024-08-02 | End: 2024-08-03

## 2024-08-02 RX ORDER — FUROSEMIDE 10 MG/ML
60 INJECTION INTRAMUSCULAR; INTRAVENOUS
Status: DISCONTINUED | OUTPATIENT
Start: 2024-08-02 | End: 2024-08-02

## 2024-08-02 RX ORDER — ALBUMIN (HUMAN) 12.5 G/50ML
12.5 SOLUTION INTRAVENOUS ONCE
Status: COMPLETED | OUTPATIENT
Start: 2024-08-02 | End: 2024-08-02

## 2024-08-02 RX ORDER — FUROSEMIDE 10 MG/ML
15 SYRINGE (ML) INJECTION CONTINUOUS
Status: DISCONTINUED | OUTPATIENT
Start: 2024-08-02 | End: 2024-08-03

## 2024-08-02 RX ORDER — POTASSIUM CHLORIDE 20 MEQ/1
40 TABLET, EXTENDED RELEASE ORAL
Status: COMPLETED | OUTPATIENT
Start: 2024-08-02 | End: 2024-08-02

## 2024-08-02 RX ORDER — POTASSIUM CHLORIDE 20 MEQ/1
20 TABLET, EXTENDED RELEASE ORAL ONCE
Status: COMPLETED | OUTPATIENT
Start: 2024-08-02 | End: 2024-08-02

## 2024-08-02 RX ADMIN — BUSPIRONE HYDROCHLORIDE 2.5 MG: 5 TABLET ORAL at 08:43

## 2024-08-02 RX ADMIN — METOPROLOL SUCCINATE 100 MG: 100 TABLET, EXTENDED RELEASE ORAL at 08:44

## 2024-08-02 RX ADMIN — PANTOPRAZOLE SODIUM 20 MG: 20 TABLET, DELAYED RELEASE ORAL at 08:44

## 2024-08-02 RX ADMIN — APIXABAN 5 MG: 5 TABLET, FILM COATED ORAL at 08:43

## 2024-08-02 RX ADMIN — ACETAMINOPHEN 975 MG: 325 TABLET, FILM COATED ORAL at 21:45

## 2024-08-02 RX ADMIN — POTASSIUM CHLORIDE 20 MEQ: 1500 TABLET, EXTENDED RELEASE ORAL at 20:48

## 2024-08-02 RX ADMIN — POTASSIUM CHLORIDE 40 MEQ: 1500 TABLET, EXTENDED RELEASE ORAL at 13:10

## 2024-08-02 RX ADMIN — ALBUMIN (HUMAN) 12.5 G: 0.25 INJECTION, SOLUTION INTRAVENOUS at 11:28

## 2024-08-02 RX ADMIN — CARBAMAZEPINE 200 MG: 200 TABLET ORAL at 08:44

## 2024-08-02 RX ADMIN — ATORVASTATIN CALCIUM 80 MG: 80 TABLET, FILM COATED ORAL at 16:06

## 2024-08-02 RX ADMIN — Medication 15 MG/HR: at 19:40

## 2024-08-02 RX ADMIN — ACETAMINOPHEN 975 MG: 325 TABLET, FILM COATED ORAL at 16:06

## 2024-08-02 RX ADMIN — FUROSEMIDE 60 MG: 10 INJECTION, SOLUTION INTRAMUSCULAR; INTRAVENOUS at 17:44

## 2024-08-02 RX ADMIN — MAGNESIUM SULFATE HEPTAHYDRATE 2 G: 40 INJECTION, SOLUTION INTRAVENOUS at 17:34

## 2024-08-02 RX ADMIN — FERROUS SULFATE TAB 325 MG (65 MG ELEMENTAL FE) 325 MG: 325 (65 FE) TAB at 08:43

## 2024-08-02 RX ADMIN — CARBAMAZEPINE 200 MG: 200 TABLET ORAL at 17:34

## 2024-08-02 RX ADMIN — BUSPIRONE HYDROCHLORIDE 2.5 MG: 5 TABLET ORAL at 20:48

## 2024-08-02 RX ADMIN — POTASSIUM CHLORIDE 40 MEQ: 1500 TABLET, EXTENDED RELEASE ORAL at 16:07

## 2024-08-02 RX ADMIN — MIDODRINE HYDROCHLORIDE 2.5 MG: 2.5 TABLET ORAL at 11:30

## 2024-08-02 RX ADMIN — FUROSEMIDE 40 MG: 10 INJECTION, SOLUTION INTRAMUSCULAR; INTRAVENOUS at 08:33

## 2024-08-02 RX ADMIN — OLANZAPINE 2.5 MG: 10 INJECTION, POWDER, FOR SOLUTION INTRAMUSCULAR at 23:29

## 2024-08-02 RX ADMIN — APIXABAN 5 MG: 5 TABLET, FILM COATED ORAL at 17:34

## 2024-08-02 RX ADMIN — SERTRALINE HYDROCHLORIDE 25 MG: 25 TABLET ORAL at 08:53

## 2024-08-02 RX ADMIN — MIDODRINE HYDROCHLORIDE 2.5 MG: 2.5 TABLET ORAL at 16:06

## 2024-08-02 RX ADMIN — MELATONIN 6 MG: 3 TAB ORAL at 20:48

## 2024-08-02 RX ADMIN — ASPIRIN 81 MG: 81 TABLET, COATED ORAL at 08:44

## 2024-08-02 NOTE — ASSESSMENT & PLAN NOTE
History of embolic CVA, continue aspirin, statin,  Given frequent falls, may need to weigh risk benefits of continuing Eliquis.  Follow-up with PCP.  Given high [id number] score, will continue for now

## 2024-08-02 NOTE — PROGRESS NOTES
Cardiology Progress Note   MD Wilson Richardson MD, FACC  Jordi Vela DO, [facility]  MD Marry Rivera DO, [facility]  Cipriano Oswald DO, [facility]  ----------------------------------------------------------------  [address] [address]    Adrianna Macias 89 y.o. female MRN: [id number]  Unit/Bed#: E2 -01 Encounter: [id number]      ASSESSMENT:   Paroxysmal atrial fibrillation with RVR on Eliquis  Fall secondary to volume depletion with UTI  History of CVA from embolic etiology on anticoagulation, October 2018  Severe mitral regurgitation  Moderate to severe tricuspid regurgitation  Mild mitral stenosis with mean PG 6 mmHg at 96 bpm and MVA 2.29 cm² by PHT, July 2024  Hypertension  LVEF 70%, moderate LVH, severe biatrial dilatation, AV sclerosis, mild AR, mild MS, severe MR, moderate to severe TR with PASP 56 mmHg, July 2024  History of seizure disorder  Urinary tract infection  Anemia    PLAN:  Patient reinitiated on anticoagulation after discussing with family  Currently on IV diuretic and not requiring any oxygen  Decompensation of heart failure likely related to diastolic dysfunction as well as severe mitral and tricuspid regurgitation  Patient is not appropriate candidate for any form of valve repair and per patient's son, would not want any invasive procedures of any kind  Can consider repeating chest x-ray today on increased dose of diuretic  Keep potassium greater than 4 magnesium greater than 2  Strict I's/O's and daily weights  Continue amlodipine, metoprolol, aspirin and high intensity statin  Will continue with rate control for management of AF given patient's age and cognitive status  If patient has further RVR, can discontinue amlodipine and start diltiazem  mg daily  Poor long-term prognosis    Signed: Jordi Vela DO, [facility] CenterALEXANDRE, EVANGELINA LOVETT, FACP      History of Present Illness:  Patient seen and examined.  Patient remains on a  "one-to-one.  She is confused this morning and not answering questions appropriately.  Resting in bed comfortably.  No acute events overnight.      Review of Systems:  Review of systems unobtainable secondary to mental status.    Allergies   Allergen Reactions    Citalopram      Other reaction(s): Other (See Comments)  insomnia    Dextrose 5%-Electrolyte #48     Levofloxacin      Other reaction(s): Other (See Comments)  \"feels lousey\"    Morphine Other (See Comments)     unknown    Nitrofurantoin Other (See Comments)     diarrhea    Oxycodone Other (See Comments)     unknown    Paroxetine Other (See Comments)     nausea    Pseudoephedrine Other (See Comments)     insomnia       No current facility-administered medications on file prior to encounter.     Current Outpatient Medications on File Prior to Encounter   Medication Sig    Acetaminophen 500 MG Take 500 mg by mouth every 4 (four) hours as needed    amLODIPine (NORVASC) 5 mg tablet Take 5 mg by mouth    apixaban (ELIQUIS) 5 mg Take 5 mg by mouth 2 (two) times a day     aspirin (ECOTRIN LOW STRENGTH) 81 mg EC tablet Take 81 mg by mouth daily    atorvastatin (LIPITOR) 80 mg tablet Take 80 mg by mouth    bisacodyl (DULCOLAX) 10 mg suppository Insert 10 mg into the rectum daily as needed    busPIRone (BUSPAR) 5 mg tablet Take 2.5 mg by mouth 2 (two) times a day    calcium-vitamin D 250-100 MG-UNIT per tablet Take 1 tablet by mouth daily     carBAMazepine (CARBATROL) 200 mg 12 hr capsule TAKE ONE CAPSULE BY MOUTH TWICE DAILY    Cholecalciferol 2000 units CAPS Take 1 capsule by mouth 2 (two) times a day     glycerin adult 2 g suppository Insert 1 suppository into the rectum    hydrochlorothiazide (MICROZIDE) 12.5 mg capsule Take 12.5 mg by mouth (Patient not taking: Reported on 8/11/2021)    lisinopril (ZESTRIL) 20 mg tablet Take 20 mg by mouth daily    LORazepam (ATIVAN) 0.5 mg tablet Take 0.5 mg by mouth daily at bedtime    Magnesium Hydroxide (MILK OF MAGNESIA PO) " Take by mouth    Melatonin 5 MG TABS Take 5 mg by mouth daily at bedtime    metFORMIN (GLUCOPHAGE-XR) 500 mg 24 hr tablet  (Patient not taking: Reported on 7/9/2024)    METOPROLOL SUCCINATE ER PO Take 50 mg by mouth daily    multivitamin (THERAGRAN) TABS Take 1 tablet by mouth    omeprazole (PriLOSEC) 20 mg delayed release capsule Take 20 mg by mouth 2 (two) times a day     ondansetron (ZOFRAN) 4 mg tablet Take 4 mg by mouth daily as needed    sertraline (ZOLOFT) 25 mg tablet Take 25 mg by mouth    trimethoprim (PROLOPRIM) 100 mg tablet Take 100 mg by mouth (Patient not taking: Reported on 7/9/2024)        Current Facility-Administered Medications   Medication Dose Route Frequency Provider Last Rate    acetaminophen  975 mg Oral Q8H [facility] Sung Lainez DO      aluminum-magnesium hydroxide-simethicone  30 mL Oral Q6H PRN Mika Jackson MD      amLODIPine  5 mg Oral Daily Mika Jackson MD      apixaban  5 mg Oral BID Sung Lainez DO      aspirin  81 mg Oral Daily Mika Jackson MD      atorvastatin  80 mg Oral Daily With Dinner Mika Jackson MD      bisacodyl  10 mg Rectal Daily PRN Mika Jackson MD      busPIRone  2.5 mg Oral BID Mika Jackson MD      carBAMazepine  200 mg Oral BID Mika Jackson MD      ferrous sulfate  325 mg Oral Daily With Breakfast Sung Lainez DO      furosemide  40 mg Intravenous TID (diuretic) Sung Lainez DO      lisinopril  20 mg Oral Daily Mika Jackson MD      melatonin  6 mg Oral HS BRITTNEY Blair      metoprolol  5 mg Intravenous Q4H PRN Sung Lainez DO      metoprolol succinate  100 mg Oral BID Jordi Vela, DO      OLANZapine  2.5 mg Intramuscular Q8H PRN Sung Lainez, DO      ondansetron  4 mg Intravenous Q6H PRN Mika Jackson MD      pantoprazole  20 mg Oral Daily Before Breakfast Mika Jackson MD      sertraline  25 mg Oral Daily Mika Jackson MD              Vitals:    08/01/24 1452 08/01/24 1910 08/01/24 2146 08/02/24  "0210   BP: 122/74 128/63 123/67 137/78   BP Location: Left arm Left arm Left arm Left arm   Pulse: 90 99 103 100   Resp: 16 18 18 18   Temp: 98.3 °F (36.8 °C) 97.6 °F (36.4 °C) 97.9 °F (36.6 °C) 98 °F (36.7 °C)   TempSrc: Temporal Temporal Temporal Temporal   SpO2: 95% 96% 95% 95%   Weight:       Height:         Body mass index is 28.35 kg/m².      Intake/Output Summary (Last 24 hours) at 8/2/2024 0658  Last data filed at 8/1/2024 1752  Gross per 24 hour   Intake 240 ml   Output 1502 ml   Net -1262 ml       Weight change:     PHYSICAL EXAMINATION:  Gen: Awake, Alert, NAD  Head/eyes: Significant facial ecchymoses, pupils equal and round, Anicteric  ENT: mmm  Neck: Supple, No elevated JVP, trachea midline  Resp: CTA bilaterally no w/r/r  CV: irreg irreg +S1, S2, No m/r/g  Abd: Soft, NT/ND + BS  Ext: no LE edema bilaterally  Neuro: Follows commands, moves all extermities  Psych: Blunted affect, confused mood, cooperative attitude, non-combative  Skin: warm; no rash, erythema or venous stasis changes on exposed skin    Lab Results:  Results from last 7 days   Lab Units 08/01/24  0545   WBC Thousand/uL 8.35   HEMOGLOBIN g/dL 8.7*   HEMATOCRIT % 29.2*   PLATELETS Thousands/uL 300     Results from last 7 days   Lab Units 08/01/24  0545   POTASSIUM mmol/L 4.0   CHLORIDE mmol/L 109*   CO2 mmol/L 30   BUN mg/dL 30*   CREATININE mg/dL 1.01   CALCIUM mg/dL 8.1*     No results found for: \"TROPONINT\"                Tele: AF w/ RVR 80s to 100s    This note was completed in part utilizing M-Modal Fluency Direct Software.  Grammatical errors, random word insertions, spelling mistakes, and incomplete sentences may be an occasional consequence of this system secondary to software limitations, ambient noise, and hardware issues.  If you have any questions or concerns about the content, text, or information contained within the body of this dictation, please contact the provider for clarification.      "

## 2024-08-02 NOTE — ASSESSMENT & PLAN NOTE
Patient presents with atrial flutter with RVR.  Suspect secondary to infection  Rates improved today   Continue increased dose metoprolol succinate 100 mg BID plus prn IV lopressor   Continue to monitor telemetry  Appreciate cardiology recommendations

## 2024-08-02 NOTE — ASSESSMENT & PLAN NOTE
Continue metoprolol  Hold amlodipine and lisinopril to allow for diuresis   Pt transported to ultrasound, NAD.

## 2024-08-02 NOTE — PROGRESS NOTES
[facility]  Progress Note  Name: Adrianna Macias I  MRN: [id number]  Unit/Bed#: E2 -01 I Date of Admission: 7/28/2024   Date of Service: 8/2/2024 I Hospital Day: 5    Assessment & Plan   * Metabolic encephalopathy  Assessment & Plan  89-year-old female presented with altered mental status, falls  Presented from nursing home, history of dementia with confusion at baseline.  History of multiple falls in the past  Suspect secondary to UTI, hospital delirium   Appreciate geriatrics recommendations  Delirium precautions  Much improved status, conversing was alert and oriented x 2 for me.  Continue to monitor off benzodiazepines  PT/OT-likely return to facility when mental status improves    Acute diastolic heart failure (HCC)  Assessment & Plan  Wt Readings from Last 3 Encounters:   07/30/24 70.3 kg (155 lb)   11/09/22 71.8 kg (158 lb 6.4 oz)   08/11/21 77.7 kg (171 lb 3.2 oz)     Chest x-ray with pleural effusions as well as pulmonary edema  EF 70%, severely dilated bilateral atrium, severe mitral valve regurgitation, severe tricuspid valve regurgitation  Chest x-ray with persistent pulmonary edema, right-sided effusion on my read  Increase Lasix to 60 mg IV 3 times daily  Add on midodrine to assist with diuresis given soft blood pressures this morning          Fall  Assessment & Plan  Presented with multiple falls   Recent ED visit for fall   PT/OT   CM for assistance with placement on discharge     Atrial fibrillation (HCC)  Assessment & Plan  Patient presents with atrial flutter with RVR.  Suspect secondary to infection  Rates improved today   Continue increased dose metoprolol succinate 100 mg BID plus prn IV lopressor   Continue to monitor telemetry  Appreciate cardiology recommendations    Anemia  Assessment & Plan  Hemoglobin 7.8 on admission, previous baseline appears to be 9-10  Recent fall with multiple facial ecchymosis, scalp hematoma  Remained stable, no signs of active  bleeding  B12, folic acid within normal limits  Continue iron tablets     UTI (urinary tract infection)  Assessment & Plan  UA concerning for infection, reporting urinary frequency and incontinence  Cultures return  Patient completed adequate course of IV Rocephin    Hypertension  Assessment & Plan  Continue metoprolol  Hold amlodipine and lisinopril to allow for diuresis    History of CVA (cerebrovascular accident)  Assessment & Plan  History of embolic CVA, continue aspirin, statin,  Given frequent falls, may need to weigh risk benefits of continuing Eliquis.  Follow-up with PCP.  Given high [id number] score, will continue for now    History of seizure  Assessment & Plan  Continue Tegretol 200 mg twice daily           VTE Pharmacologic Prophylaxis: apixaban     Patient Centered Rounds:  Patient care rounds were performed with nursing    Education and Discussions with Family / Patient: Called Son on telephone during rounds, no answer     Time Spent for Care: I have spent a total time of 46 minutes on 24 in caring for this patient including Impressions, Counseling / Coordination of care, Documenting in the medical record, Reviewing / ordering tests, medicine, procedures  , and Communicating with other healthcare professionals .      Current Length of Stay: 5 day(s)    Current Patient Status: Inpatient   Certification Statement: The patient will continue to require additional inpatient hospital stay due to need for IV duretics for management of heart failure    Discharge Plan: Discharge when near euvolemic, suspect 48 hours    Code Status: Level 3 - DNAR and DNI      Subjective:   Patient seen and evaluated at bedside.  Asymptomatic hypotension this morning which improved with albumin and midodrine    Objective:     Vitals:   Temp (24hrs), Av °F (36.7 °C), Min:97.6 °F (36.4 °C), Max:98.3 °F (36.8 °C)    Temp:  [97.6 °F (36.4 °C)-98.3 °F (36.8 °C)] 98.2 °F (36.8 °C)  HR:  [] 98  Resp:  [16-18]  18  BP: ()/(46-78) 88/46  SpO2:  [95 %-96 %] 96 %  Body mass index is 28.35 kg/m².     Input and Output Summary (last 24 hours):       Intake/Output Summary (Last 24 hours) at 8/2/2024 1343  Last data filed at 8/1/2024 1752  Gross per 24 hour   Intake --   Output 500 ml   Net -500 ml       Physical Exam:     Physical Exam  Vitals reviewed.   Constitutional:       General: She is not in acute distress.     Appearance: She is well-developed. She is not ill-appearing, toxic-appearing or diaphoretic.   HENT:      Head: Normocephalic and atraumatic.      Mouth/Throat:      Mouth: Mucous membranes are moist.   Eyes:      General: No scleral icterus.     Extraocular Movements: Extraocular movements intact.   Cardiovascular:      Rate and Rhythm: Normal rate and regular rhythm.      Heart sounds: Normal heart sounds.   Pulmonary:      Effort: Pulmonary effort is normal. No respiratory distress.      Breath sounds: Rales present. No wheezing.      Comments: Decreased breath sounds in bases bilaterally  Abdominal:      General: There is no distension.      Palpations: Abdomen is soft.      Tenderness: There is no abdominal tenderness. There is no guarding or rebound.   Musculoskeletal:         General: No swelling, tenderness or deformity.   Skin:     General: Skin is warm and dry.   Neurological:      Mental Status: She is alert.      Comments: Alert and oriented x 2 to self and place   Psychiatric:         Mood and Affect: Mood normal.         Behavior: Behavior normal.         Thought Content: Thought content normal.         Judgment: Judgment normal.         Additional Data:     Labs: I have reviewed pertinent results     Results from last 7 days   Lab Units 08/02/24  1122 07/30/24  0512 07/29/24  0554   WBC Thousand/uL 7.05   < > 8.99   HEMOGLOBIN g/dL 8.7*   < > 7.8*   HEMATOCRIT % 29.8*   < > 26.7*   PLATELETS Thousands/uL 313   < > 262   SEGS PCT %  --   --  76*   LYMPHO PCT %  --   --  13*   MONO PCT %  --   --   11   EOS PCT %  --   --  0    < > = values in this interval not displayed.     Results from last 7 days   Lab Units 08/02/24  1122   SODIUM mmol/L 142   POTASSIUM mmol/L 3.2*   CHLORIDE mmol/L 103   CO2 mmol/L 34*   BUN mg/dL 30*   CREATININE mg/dL 1.15   ANION GAP mmol/L 5   CALCIUM mg/dL 8.1*   GLUCOSE RANDOM mg/dL 195*         Results from last 7 days   Lab Units 07/30/24  2027   POC GLUCOSE mg/dl 148*                 Imaging: I have reviewed pertinent imaging       Recent Cultures (last 7 days):     Results from last 7 days   Lab Units 07/28/24  1332   URINE CULTURE  >100,000 cfu/ml Corynebacterium urealyticum*  40,000-49,000 cfu/ml Escherichia coli*  10,000-19,000 cfu/ml       Last 24 Hours Medication List:   Current Facility-Administered Medications   Medication Dose Route Frequency Provider Last Rate    acetaminophen  975 mg Oral Q8H [facility] Sung Lainez DO      aluminum-magnesium hydroxide-simethicone  30 mL Oral Q6H PRN Mika Jackson MD      apixaban  5 mg Oral BID Sung Lainez DO      aspirin  81 mg Oral Daily Mika Jackson MD      atorvastatin  80 mg Oral Daily With Dinner Mika Jackson MD      bisacodyl  10 mg Rectal Daily PRN Mika Jackson MD      busPIRone  2.5 mg Oral BID Mika Jackson MD      carBAMazepine  200 mg Oral BID Mika Jackson MD      ferrous sulfate  325 mg Oral Daily With Breakfast Sung Lainez DO      furosemide  60 mg Intravenous TID (diuretic) Sung Lainez DO      melatonin  6 mg Oral HS BRITTNEY Blair      metoprolol  5 mg Intravenous Q4H PRN Sung Lainez DO      metoprolol succinate  100 mg Oral BID Jordi Vela, DO      midodrine  2.5 mg Oral TID AC Sung Lainez DO      OLANZapine  2.5 mg Intramuscular Q8H PRN Sung Lainez DO      ondansetron  4 mg Intravenous Q6H PRN Mika Jackson MD      pantoprazole  20 mg Oral Daily Before Breakfast Mika Jackson MD      potassium chloride  40 mEq Oral TID With Meals Sung  DO Fatou      sertraline  25 mg Oral Daily Mika Jackson MD          Today, Patient Was Seen By: Sung Lainez DO    ** Please Note: Dictation voice to text software may have been used in the creation of this document. **

## 2024-08-02 NOTE — PLAN OF CARE
Problem: Potential for Falls  Goal: Patient will remain free of falls  Description: INTERVENTIONS:  - Educate patient/family on patient safety including physical limitations  - Instruct patient to call for assistance with activity   - Consult OT/PT to assist with strengthening/mobility   - Keep Call bell within reach  - Keep bed low and locked with side rails adjusted as appropriate  - Keep care items and personal belongings within reach  - Initiate and maintain comfort rounds  - Make Fall Risk Sign visible to staff  - Offer Toileting every 2 Hours, in advance of need  - Initiate/Maintain bed alarm  - Obtain necessary fall risk management equipment: yellow socks, yellow bracelet, bed alarm  - Apply yellow socks and bracelet for high fall risk patients  - Consider moving patient to room near nurses station  Outcome: Progressing     Problem: SAFETY ADULT  Goal: Patient will remain free of falls  Description: INTERVENTIONS:  - Educate patient/family on patient safety including physical limitations  - Instruct patient to call for assistance with activity   - Consult OT/PT to assist with strengthening/mobility   - Keep Call bell within reach  - Keep bed low and locked with side rails adjusted as appropriate  - Keep care items and personal belongings within reach  - Initiate and maintain comfort rounds  - Make Fall Risk Sign visible to staff  - Offer Toileting every 2 Hours, in advance of need  - Initiate/Maintain bed alarm  - Obtain necessary fall risk management equipment: yellow socks, yellow bracelet, bed alarm  - Apply yellow socks and bracelet for high fall risk patients  - Consider moving patient to room near nurses station  Outcome: Progressing     Problem: DISCHARGE PLANNING  Goal: Discharge to home or other facility with appropriate resources  Description: INTERVENTIONS:  - Identify barriers to discharge w/patient and caregiver  - Arrange for needed discharge resources and transportation as appropriate  -  Identify discharge learning needs (meds, wound care, etc.)  - Arrange for interpretive services to assist at discharge as needed  - Refer to Case Management Department for coordinating discharge planning if the patient needs post-hospital services based on physician/advanced practitioner order or complex needs related to functional status, cognitive ability, or social support system  Outcome: Progressing

## 2024-08-02 NOTE — PROGRESS NOTES
Progress Note - Geriatric Medicine   Adrianna OBANDO Colleen 89 y.o. female MRN: [id number]  Unit/Bed#: E2 -01 Encounter: [id number]      Assessment/Plan:    Acute Metabolic Encephalopathy  Presented to the hospital for falls and altered mental status  Baseline mentation per the facility is alert and oriented to person versus person and place (they do note that she does tend to wax and wane throughout the day)  Current cause considerations   Suspected to be multifactorial secondary to dementia, suspected UTI, frequent falls, antibiotic use, lorazepam use, anemia, sleep disturbances, and hospitalization  UA on admission positive for large blood, large leukocytes, innumerable RBC and WBC, and moderate bacteria  Urine culture revealed > 100,000 cFu Corynebacterium urealyticum and 40-49,000 cFu E Coli  No leukocytosis noted on labs today   Hemoglobin on labs today noted to be 8.7  Patient is oriented only to person on exam today  She remains off 1:1 supervision   Nursing notes she did not sleep well last night (only about 3 hours)  She is pleasant, calm, and redirectable   Patient did not require any prn medication overnight   Identify and treat reversible causes of confusion including infection, dehydration, electrolyte imbalance, anemia, hypoxia, urinary retention, constipation, pain, and sleep disturbance  Maintain delirium precautions   Provide redirection, reorientation, and distraction techniques  Maintain fall and safety precautions   Assist with ADLs/IADLs  Avoid deliriogenic medications such as tramadol, benzodiazepines, anticholinergics, benadryl  Treat pain using geriatric pain protocol   Encourage oral hydration and nutrition   Monitor for constipation and urinary retention   Implement sleep hygiene and limit night time interuptions   Maintain sleep-wake cycle   Encourage early and frequent mobilization   Encourage participation in group activities  Most recent EKG on 7/28/2024 revealed a QTc interval of  409  May want to avoid antipsychotics for acute agitation behaviors as these medications lower the seizure threshold and the patient does have a seizure history  If all other interventions are unsuccessful for acute agitation behaviors, can consider Depakote 125 mg once (may use IV Depacon if patient is not taking oral medication)  Would monitor on this medication as Depakote can increase the effects of Tegretol  Would avoid benzodiazepines such as Ativan as these can worsen delirium   Redirect and reorient as needed  Keep mentally, physically, and socially active    Vascular Dementia Without Behavioral Disturbance, Psychotic Disturbance, and Mood Disturbance   Patient has a known documented history  Baseline mentation per the facility he is oriented to person versus person and place (mentation waxes and wanes at baseline)  Facility notes that she is impulsive and does have occasional behaviors  Patient is oriented only to person on exam today  She remains off 1:1 supervision   She is pleasant, calm, and redirectable   Nursing notes no acute issues or events overnight   She is off her 1:1 and appears to be doing well   Most recent TSH on 7/29/2024 noted to be 1.398  Most recent vitamin B12 level on 7/29/2024 noted to be 188  Recommend supplementing with a goal vitamin B12 level > 400  CT of the head on 7/28/2024 revealed moderate chronic microvascular ischemic changes  Patient scored 19/30 on MoCA on 6/20/2019  Maintain delirium precautions as discussed above  Redirect and reorient as needed  Keep physically, mentally, and socially active    Deconditioning   Patient is at increased risk for deconditioning secondary to dementia, UTI, frequent falls, antibiotic use, lorazepam use, anemia, sleep disturbances, weakness, gait dysfunction, and hospitalization  Continue to optimize diet, hydration, and mobility for healing   GFR 42 on labs today  Patient has no documented history of CKD  Baseline creatinine unclear  Avoid  nephrotoxic medication renal dose medication  Keep hydrated  Type II Diabetes   Most recent hemoglobin A1c on 9/26/2022 noted to be 6.7  Patient is not on any medication for this at baseline   Patient is not currently on blood sugar checks  Glucose on labs this morning noted to be 195  Consider diabetic diet  Avoid hypoglycemia  Anemia   Baseline hemoglobin unclear  Hemoglobin on labs today noted to be 8.7  Patient has had a few recent falls and is noted to have facial ecchymosis and scalp hematoma (areas of ecchymosis are improving)  No active signs of bleeding noted on exam today  Continue to monitor CBC  Transfuse for hemoglobin < 7   Monitor for signs and symptoms of infection, dehydration, DVT, and skin breakdown    Frailty   Clinical Frail Scale: 6- Moderately Frail  Need help with all outside activities  Need help with stairs and bathing  May need assistance with dressing  Most recent albumin on 7/9/2024 noted to be 3.7  Consider nutrition consult  Encourage protein supplementation     Ambulatory Dysfunction/Falls  Facility notes the patient has had 2 recent falls  She has been ambulating with a roller walker at baseline  PT/OT consulted to assist with strengthening/mobility and assist with discharge planning to appropriate level of care  Assess patient frequently for physical needs, encourage use of assistant devices as needed and directed by PT/OT  Identify cognitive and physical deficits and behaviors that affect risk of falls  Consider moving patient closer to nursing station to monitor more closely for impulsive behavior which may increase risk of falls  Flushing fall precautions   Educate patient/family on patient safety including physical limitations and importance of using call bell for assistance   Modify environment to reduce risk of injury including disconnecting from pole when not in use, ensuring adequate lighting in room and restroom, ensuring that path to restroom is clear and free of trip  hazards  Out of bed as tolerated     Dentition/Appetite   Facility is unsure if patient wears dentures  She does have a good appetite at baseline  She has been consuming about 100% of meals   Ensure meal consistency is appropriate for all abilities   Consider nutrition consult   Continue aspiration precautions     Elimination   Facility notes the patient was primarily continent of bowel and bladder up until recently  She will ambulate herself independently to the bathroom when she had to go  Patient has recently been incontinent  She has been incontinent of bowel and bladder while inpatient  Last documented bowel movement was this morning   Patient is currently on ferrous sulfate which increases the risk of constipation  She is not currently on a bowel regimen  Consider starting on colace 100 mg BID   Monitor for constipation and urinary retention     Insomnia   Facility notes that recently the patient had been having difficulty sleeping  They note that she was asleep for 20 minutes and then will get up and ambulate the halls  Patient is on melatonin at bedtime at her facility   Patient has not been sleeping well here  She had been on lorazepam at bedtime  PDMP reviewed and appears the patient has been on this since at least June 2023  Per facility records, patient on lorazepam gel for anxiety as needed and not oral lorazepam  Oral lorazepam discontinued on 7/31 and melatonin 6 mg at bedtime ordered   Nursing notes the patient did not sleep well last night (she only got approximately 3 hours of sleep)  Continue with melatonin scheduled at bedtime   First line is behavioral therapy   Avoid sedative hypnotics including benzodiazepines and benadryl  Encourage staying awake during the day   Encourage daytime activities and morning exercise   Decrease or eliminate daytime naps   Avoid caffeine especially during late afternoon and evening hours  Establish a nighttime routine  Implement sleep hygiene and limit nighttime  "interruptions    Anxiety/Depression  Patient has a documented history of anxiety and depression  She is on lorazepam 0.5 mg daily at bedtime while inpatient   Per review of facility records she is on lorazepam gel Q 6 hours prn   Lorazepam discontinued on 7/31 and patient has been doing well since discontinuation   Patient remains on sertraline   Mood appears stable on exam today   Continue supportive care     Urinary Tract Infection  Patient presented to hospital with altered mental status  UA on admission positive for large blood, large leukocytes, innumerable WBCs and RBCs, and moderate bacteria  Urine culture revealed > 100,000 cFu Corynebacterium urealyticum and 40-49,000 cFu E Coli  Monitor for urinary retention  Management per primary team    History of CVA  Patient with history of embolic CVA  She is on aspirin and statin at baseline  Per facility records, patient is on Eliquis 5 mg BID   Eliquis had been on hold but was restarted per family request   May want to consider further risk vs benefit conversations with PCP on discharge   Management per primary team      Subjective:   The patient is being seen and evaluated today at the bedside for geriatric follow-up.  She is noted be lying in bed comfortably in no acute distress.  She is sleepy but is arousable to her name. She is oriented to person. She denies pain. She offers no acute complaints.     Care was coordinated with patients nurse Maria Isabel. She notes that the patient was up much of the night and only got 3 hours of sleep. She notes no behavioral issues.     Care was also coordinated with Dr. Lainez with internal medicine.       Review of Systems   Unable to perform ROS: Dementia       Objective:     Vitals: Blood pressure 99/68, pulse 98, temperature 98.2 °F (36.8 °C), temperature source Temporal, resp. rate 18, height 5' 2\" (1.575 m), weight 70.3 kg (155 lb), SpO2 96%.,Body mass index is 28.35 kg/m².      Intake/Output Summary (Last 24 hours) at " "8/2/2024 1106  Last data filed at 8/1/2024 1752  Gross per 24 hour   Intake 120 ml   Output 1342 ml   Net -1222 ml       Current Medications: Reviewed    Physical Exam:   Physical Exam  Vitals and nursing note reviewed.   Constitutional:       General: She is not in acute distress.     Appearance: She is not ill-appearing.   HENT:      Head: Normocephalic.      Mouth/Throat:      Mouth: Mucous membranes are moist.   Eyes:      General: No scleral icterus.     Conjunctiva/sclera: Conjunctivae normal.   Cardiovascular:      Rate and Rhythm: Tachycardia present. Rhythm irregular.   Pulmonary:      Effort: Pulmonary effort is normal. No respiratory distress.   Abdominal:      General: Bowel sounds are normal. There is no distension.      Palpations: Abdomen is soft.      Tenderness: There is no abdominal tenderness.   Musculoskeletal:         General: No tenderness.      Right lower leg: No edema.      Left lower leg: No edema.   Skin:     General: Skin is warm and dry.      Findings: Bruising (facial and left hip) present.   Neurological:      General: No focal deficit present.      Mental Status: She is alert.      Motor: Weakness present.      Gait: Gait abnormal.      Comments: Oriented to person   Disoriented to place and time  Hx of dementia          Invasive Devices       Peripheral Intravenous Line  Duration             Peripheral IV 07/28/24 Left;Proximal;Ventral (anterior) Forearm 4 days                    Lab, Imaging and other studies: I have personally reviewed pertinent reports.      Please note:  Voice-recognition software may have been used in the preparation of this document.  Occasional wrong word or \"sound-alike\" substitutions may have occurred due to the inherent limitations of voice recognition software.  Interpretation should be guided by context.    "

## 2024-08-02 NOTE — CASE MANAGEMENT
Case Management Discharge Planning Note    Patient name Adrianna Macias  [facility] /E2 -* MRN [id number]  : 1935 Date 2024       Current Admission Date: 2024  Current Admission Diagnosis:Metabolic encephalopathy   Patient Active Problem List    Diagnosis Date Noted Date Diagnosed    Fall 2024     Acute diastolic heart failure (HCC) 2024     Atrial fibrillation (HCC) 2024     Hypertension 2024     Metabolic encephalopathy 2024     UTI (urinary tract infection) 2024     Anemia 2024     Migraine without aura and without status migrainosus, not intractable 2019     History of seizure 2019     History of CVA (cerebrovascular accident) 2019       LOS (days): 5  Geometric Mean LOS (GMLOS) (days): 4.9  Days to GMLOS:-0.1     OBJECTIVE:  Risk of Unplanned Readmission Score: 25.83         Current admission status: Inpatient   Preferred Pharmacy:   [id number] - [place] - [address] [place], [place] [address] [address] [address]  Phone: [phone] Fax: [phone]    [facility] [place] [place] [facility] [address]  Phone: [phone] Fax: [phone]    Primary Care Provider: Racquel Bryant DO    Primary Insurance: MEDICARE  Secondary Insurance: BLUE CROSS    DISCHARGE DETAILS:                           Contacts  Patient Contacts: Andre Macias/Son  Relationship to Patient:: Family  Contact Method: Phone  Phone Number: [phone]  Reason/Outcome:  (Call placed to attempt IMM.. VM left requesting return call)

## 2024-08-02 NOTE — PLAN OF CARE
Problem: Potential for Falls  Goal: Patient will remain free of falls  Description: INTERVENTIONS:  - Educate patient/family on patient safety including physical limitations  - Instruct patient to call for assistance with activity   - Consult OT/PT to assist with strengthening/mobility   - Keep Call bell within reach  - Keep bed low and locked with side rails adjusted as appropriate  - Keep care items and personal belongings within reach  - Initiate and maintain comfort rounds  - Make Fall Risk Sign visible to staff  - Offer Toileting every 2 Hours, in advance of need  - Initiate/Maintain bed alarm  - Obtain necessary fall risk management equipment: yellow socks, yellow bracelet, bed alarm  - Apply yellow socks and bracelet for high fall risk patients  - Consider moving patient to room near nurses station  Outcome: Progressing     Problem: Prexisting or High Potential for Compromised Skin Integrity  Goal: Skin integrity is maintained or improved  Description: INTERVENTIONS:  - Identify patients at risk for skin breakdown  - Assess and monitor skin integrity  - Assess and monitor nutrition and hydration status  - Monitor labs   - Assess for incontinence   - Turn and reposition patient  - Assist with mobility/ambulation  - Relieve pressure over bony prominences  - Avoid friction and shearing  - Provide appropriate hygiene as needed including keeping skin clean and dry  - Evaluate need for skin moisturizer/barrier cream  - Collaborate with interdisciplinary team   - Patient/family teaching  - Consider wound care consult   Outcome: Progressing     Problem: PAIN - ADULT  Goal: Verbalizes/displays adequate comfort level or baseline comfort level  Description: Interventions:  - Encourage patient to monitor pain and request assistance  - Assess pain using appropriate pain scale  - Administer analgesics based on type and severity of pain and evaluate response  - Implement non-pharmacological measures as appropriate and  evaluate response  - Consider cultural and social influences on pain and pain management  - Notify physician/advanced practitioner if interventions unsuccessful or patient reports new pain  Outcome: Progressing     Problem: INFECTION - ADULT  Goal: Absence or prevention of progression during hospitalization  Description: INTERVENTIONS:  - Assess and monitor for signs and symptoms of infection  - Monitor lab/diagnostic results  - Monitor all insertion sites, i.e. indwelling lines, tubes, and drains  - Monitor endotracheal if appropriate and nasal secretions for changes in amount and color  - Gwynn appropriate cooling/warming therapies per order  - Administer medications as ordered  - Instruct and encourage patient and family to use good hand hygiene technique  - Identify and instruct in appropriate isolation precautions for identified infection/condition  Outcome: Progressing     Problem: SAFETY ADULT  Goal: Patient will remain free of falls  Description: INTERVENTIONS:  - Educate patient/family on patient safety including physical limitations  - Instruct patient to call for assistance with activity   - Consult OT/PT to assist with strengthening/mobility   - Keep Call bell within reach  - Keep bed low and locked with side rails adjusted as appropriate  - Keep care items and personal belongings within reach  - Initiate and maintain comfort rounds  - Make Fall Risk Sign visible to staff  - Offer Toileting every 2 Hours, in advance of need  - Initiate/Maintain bed alarm  - Obtain necessary fall risk management equipment: yellow socks, yellow bracelet, bed alarm  - Apply yellow socks and bracelet for high fall risk patients  - Consider moving patient to room near nurses station  Outcome: Progressing     Problem: DISCHARGE PLANNING  Goal: Discharge to home or other facility with appropriate resources  Description: INTERVENTIONS:  - Identify barriers to discharge w/patient and caregiver  - Arrange for needed discharge  resources and transportation as appropriate  - Identify discharge learning needs (meds, wound care, etc.)  - Arrange for interpretive services to assist at discharge as needed  - Refer to Case Management Department for coordinating discharge planning if the patient needs post-hospital services based on physician/advanced practitioner order or complex needs related to functional status, cognitive ability, or social support system  Outcome: Progressing     Problem: Knowledge Deficit  Goal: Patient/family/caregiver demonstrates understanding of disease process, treatment plan, medications, and discharge instructions  Description: Complete learning assessment and assess knowledge base.  Interventions:  - Provide teaching at level of understanding  - Provide teaching via preferred learning methods  Outcome: Progressing     Problem: GENITOURINARY - ADULT  Goal: Maintains or returns to baseline urinary function  Description: INTERVENTIONS:  - Assess urinary function  - Encourage oral fluids to ensure adequate hydration if ordered  - Administer IV fluids as ordered to ensure adequate hydration  - Administer ordered medications as needed  - Offer frequent toileting  - Follow urinary retention protocol if ordered  Outcome: Progressing     Problem: SKIN/TISSUE INTEGRITY - ADULT  Goal: Skin Integrity remains intact(Skin Breakdown Prevention)  Description: Assess:  -Perform Willy assessment every shift  -Clean and moisturize skin  -Inspect skin when repositioning, toileting, and assisting with ADLS  -Assess under medical devices   -Assess extremities for adequate circulation and sensation     Bed Management:  -Have minimal linens on bed & keep smooth, unwrinkled  -Change linens as needed when moist or perspiring  -Avoid sitting or lying in one position for more than 2 hours while in bed  -Keep HOB at 30 degrees     Toileting:  -Offer bedside commode  -Assess for incontinence every 2 hours  -Use incontinent care products after  each incontinent episode    Activity:  -Mobilize patient 3 times a day  -Encourage activity and walks on unit  -Encourage or provide ROM exercises   -Turn and reposition patient every 2 Hours  -Use appropriate equipment to lift or move patient in bed  -Instruct/ Assist with weight shifting when out of bed in chair  -Consider limitation of chair time 1/2 hour intervals    Skin Care:  -Avoid use of baby powder, tape, friction and shearing, hot water or constrictive clothing  -Relieve pressure over bony prominences using allevyn  -Do not massage red bony areas    Next Steps:  -Teach patient strategies to minimize risks such as falls   -Consider consults to  interdisciplinary teams such as PT, OT  Outcome: Progressing     Problem: MUSCULOSKELETAL - ADULT  Goal: Maintain or return mobility to safest level of function  Description: INTERVENTIONS:  - Assess patient's ability to carry out ADLs; assess patient's baseline for ADL function and identify physical deficits which impact ability to perform ADLs (bathing, care of mouth/teeth, toileting, grooming, dressing, etc.)  - Assess/evaluate cause of self-care deficits   - Assess range of motion  - Assess patient's mobility  - Assess patient's need for assistive devices and provide as appropriate  - Encourage maximum independence but intervene and supervise when necessary  - Involve family in performance of ADLs  - Assess for home care needs following discharge   - Consider OT consult to assist with ADL evaluation and planning for discharge  - Provide patient education as appropriate  Outcome: Progressing     Problem: SAFETY,RESTRAINT: NV/NON-SELF DESTRUCTIVE BEHAVIOR  Goal: Remains free of harm/injury (restraint for non violent/non self-detsructive behavior)  Description: INTERVENTIONS:  - Instruct patient/family regarding restraint use   - Assess and monitor physiologic and psychological status   - Provide interventions and comfort measures to meet assessed patient needs   -  Identify and implement measures to help patient regain control  - Assess readiness for release of restraint   Outcome: Progressing  Goal: Returns to optimal restraint-free functioning  Description: INTERVENTIONS:  - Assess the patient's behavior and symptoms that indicate continued need for restraint  - Identify and implement measures to help patient regain control  - Assess readiness for release of restraint   Outcome: Progressing     Problem: NEUROSENSORY - ADULT  Goal: Achieves maximal functionality and self care  Description: INTERVENTIONS  - Monitor swallowing and airway patency with patient fatigue and changes in neurological status  - Encourage and assist patient to increase activity and self care.   - Encourage visually impaired, hearing impaired and aphasic patients to use assistive/communication devices  Outcome: Progressing     Problem: CARDIOVASCULAR - ADULT  Goal: Maintains optimal cardiac output and hemodynamic stability  Description: INTERVENTIONS:  - Monitor I/O, vital signs and rhythm  - Monitor for S/S and trends of decreased cardiac output  - Administer and titrate ordered vasoactive medications to optimize hemodynamic stability  - Assess quality of pulses, skin color and temperature  - Assess for signs of decreased coronary artery perfusion  - Instruct patient to report change in severity of symptoms  Outcome: Progressing     Problem: Nutrition/Hydration-ADULT  Goal: Nutrient/Hydration intake appropriate for improving, restoring or maintaining nutritional needs  Description: Monitor and assess patient's nutrition/hydration status for malnutrition. Collaborate with interdisciplinary team and initiate plan and interventions as ordered.  Monitor patient's weight and dietary intake as ordered or per policy. Utilize nutrition screening tool and intervene as necessary. Determine patient's food preferences and provide high-protein, high-caloric foods as appropriate.     INTERVENTIONS:  - Monitor oral  intake, urinary output, labs, and treatment plans  - Assess nutrition and hydration status and recommend course of action  - Evaluate amount of meals eaten  - Assist patient with eating if necessary   - Allow adequate time for meals  - Recommend/ encourage appropriate diets, oral nutritional supplements, and vitamin/mineral supplements  - Order, calculate, and assess calorie counts as needed  - Recommend, monitor, and adjust tube feedings and TPN/PPN based on assessed needs  - Assess need for intravenous fluids  - Provide specific nutrition/hydration education as appropriate  - Include patient/family/caregiver in decisions related to nutrition  Outcome: Progressing

## 2024-08-02 NOTE — ASSESSMENT & PLAN NOTE
Wt Readings from Last 3 Encounters:   07/30/24 70.3 kg (155 lb)   11/09/22 71.8 kg (158 lb 6.4 oz)   08/11/21 77.7 kg (171 lb 3.2 oz)     Chest x-ray with pleural effusions as well as pulmonary edema  EF 70%, severely dilated bilateral atrium, severe mitral valve regurgitation, severe tricuspid valve regurgitation  Chest x-ray with persistent pulmonary edema, right-sided effusion on my read  Increase Lasix to 60 mg IV 3 times daily  Add on midodrine to assist with diuresis given soft blood pressures this morning

## 2024-08-03 ENCOUNTER — APPOINTMENT (INPATIENT)
Dept: RADIOLOGY | Facility: HOSPITAL | Age: 89
DRG: 070 | End: 2024-08-03
Payer: MEDICARE

## 2024-08-03 PROBLEM — Z71.89 GOALS OF CARE, COUNSELING/DISCUSSION: Status: ACTIVE | Noted: 2024-08-03

## 2024-08-03 LAB
ANION GAP SERPL CALCULATED.3IONS-SCNC: 8 MMOL/L (ref 4–13)
BUN SERPL-MCNC: 26 MG/DL (ref 5–25)
CALCIUM SERPL-MCNC: 9.2 MG/DL (ref 8.4–10.2)
CHLORIDE SERPL-SCNC: 97 MMOL/L (ref 96–108)
CO2 SERPL-SCNC: 37 MMOL/L (ref 21–32)
CREAT SERPL-MCNC: 1.18 MG/DL (ref 0.6–1.3)
ERYTHROCYTE [DISTWIDTH] IN BLOOD BY AUTOMATED COUNT: 18.5 % (ref 11.6–15.1)
FLUAV RNA RESP QL NAA+PROBE: NEGATIVE
FLUBV RNA RESP QL NAA+PROBE: NEGATIVE
GFR SERPL CREATININE-BSD FRML MDRD: 41 ML/MIN/1.73SQ M
GLUCOSE SERPL-MCNC: 213 MG/DL (ref 65–140)
HCT VFR BLD AUTO: 38.2 % (ref 34.8–46.1)
HGB BLD-MCNC: 10.7 G/DL (ref 11.5–15.4)
MAGNESIUM SERPL-MCNC: 2.2 MG/DL (ref 1.9–2.7)
MCH RBC QN AUTO: 20.4 PG (ref 26.8–34.3)
MCHC RBC AUTO-ENTMCNC: 28 G/DL (ref 31.4–37.4)
MCV RBC AUTO: 73 FL (ref 82–98)
PLATELET # BLD AUTO: 399 THOUSANDS/UL (ref 149–390)
PMV BLD AUTO: 10 FL (ref 8.9–12.7)
POTASSIUM SERPL-SCNC: 3.3 MMOL/L (ref 3.5–5.3)
RBC # BLD AUTO: 5.25 MILLION/UL (ref 3.81–5.12)
RSV RNA RESP QL NAA+PROBE: NEGATIVE
SARS-COV-2 RNA RESP QL NAA+PROBE: NEGATIVE
SODIUM SERPL-SCNC: 142 MMOL/L (ref 135–147)
WBC # BLD AUTO: 10.72 THOUSAND/UL (ref 4.31–10.16)

## 2024-08-03 PROCEDURE — 80048 BASIC METABOLIC PNL TOTAL CA: CPT | Performed by: INTERNAL MEDICINE

## 2024-08-03 PROCEDURE — 83735 ASSAY OF MAGNESIUM: CPT | Performed by: INTERNAL MEDICINE

## 2024-08-03 PROCEDURE — 99233 SBSQ HOSP IP/OBS HIGH 50: CPT | Performed by: INTERNAL MEDICINE

## 2024-08-03 PROCEDURE — 0241U HB NFCT DS VIR RESP RNA 4 TRGT: CPT | Performed by: INTERNAL MEDICINE

## 2024-08-03 PROCEDURE — 99232 SBSQ HOSP IP/OBS MODERATE 35: CPT | Performed by: INTERNAL MEDICINE

## 2024-08-03 PROCEDURE — 71045 X-RAY EXAM CHEST 1 VIEW: CPT

## 2024-08-03 PROCEDURE — 85027 COMPLETE CBC AUTOMATED: CPT | Performed by: INTERNAL MEDICINE

## 2024-08-03 RX ORDER — TORSEMIDE 20 MG/1
20 TABLET ORAL 2 TIMES DAILY
Status: DISCONTINUED | OUTPATIENT
Start: 2024-08-03 | End: 2024-08-03

## 2024-08-03 RX ORDER — TORSEMIDE 20 MG/1
20 TABLET ORAL 2 TIMES DAILY
Status: DISCONTINUED | OUTPATIENT
Start: 2024-08-03 | End: 2024-08-04 | Stop reason: HOSPADM

## 2024-08-03 RX ORDER — DIVALPROEX SODIUM 125 MG/1
125 TABLET, DELAYED RELEASE ORAL
Status: DISCONTINUED | OUTPATIENT
Start: 2024-08-03 | End: 2024-08-04 | Stop reason: HOSPADM

## 2024-08-03 RX ORDER — POTASSIUM CHLORIDE 20 MEQ/1
40 TABLET, EXTENDED RELEASE ORAL
Status: COMPLETED | OUTPATIENT
Start: 2024-08-03 | End: 2024-08-03

## 2024-08-03 RX ORDER — OLANZAPINE 2.5 MG/1
2.5 TABLET, FILM COATED ORAL
Status: DISCONTINUED | OUTPATIENT
Start: 2024-08-03 | End: 2024-08-03

## 2024-08-03 RX ORDER — DILTIAZEM HYDROCHLORIDE 120 MG/1
120 CAPSULE, COATED, EXTENDED RELEASE ORAL DAILY
Status: DISCONTINUED | OUTPATIENT
Start: 2024-08-03 | End: 2024-08-04 | Stop reason: HOSPADM

## 2024-08-03 RX ADMIN — METOPROLOL SUCCINATE 100 MG: 100 TABLET, EXTENDED RELEASE ORAL at 10:00

## 2024-08-03 RX ADMIN — METOPROLOL SUCCINATE 100 MG: 100 TABLET, EXTENDED RELEASE ORAL at 16:39

## 2024-08-03 RX ADMIN — MELATONIN 6 MG: 3 TAB ORAL at 20:58

## 2024-08-03 RX ADMIN — DIVALPROEX SODIUM 125 MG: 125 TABLET, DELAYED RELEASE ORAL at 21:09

## 2024-08-03 RX ADMIN — ATORVASTATIN CALCIUM 80 MG: 80 TABLET, FILM COATED ORAL at 15:30

## 2024-08-03 RX ADMIN — TORSEMIDE 20 MG: 20 TABLET ORAL at 13:20

## 2024-08-03 RX ADMIN — APIXABAN 5 MG: 5 TABLET, FILM COATED ORAL at 10:00

## 2024-08-03 RX ADMIN — DILTIAZEM HYDROCHLORIDE 120 MG: 120 CAPSULE, EXTENDED RELEASE ORAL at 11:05

## 2024-08-03 RX ADMIN — CARBAMAZEPINE 200 MG: 200 TABLET ORAL at 10:00

## 2024-08-03 RX ADMIN — ACETAMINOPHEN 975 MG: 325 TABLET, FILM COATED ORAL at 07:00

## 2024-08-03 RX ADMIN — BUSPIRONE HYDROCHLORIDE 2.5 MG: 5 TABLET ORAL at 20:58

## 2024-08-03 RX ADMIN — SERTRALINE HYDROCHLORIDE 25 MG: 25 TABLET ORAL at 10:00

## 2024-08-03 RX ADMIN — MIDODRINE HYDROCHLORIDE 2.5 MG: 2.5 TABLET ORAL at 07:38

## 2024-08-03 RX ADMIN — CARBAMAZEPINE 200 MG: 200 TABLET ORAL at 16:39

## 2024-08-03 RX ADMIN — ACETAMINOPHEN 975 MG: 325 TABLET, FILM COATED ORAL at 15:26

## 2024-08-03 RX ADMIN — PANTOPRAZOLE SODIUM 20 MG: 20 TABLET, DELAYED RELEASE ORAL at 07:38

## 2024-08-03 RX ADMIN — POTASSIUM CHLORIDE 40 MEQ: 1500 TABLET, EXTENDED RELEASE ORAL at 15:30

## 2024-08-03 RX ADMIN — APIXABAN 5 MG: 5 TABLET, FILM COATED ORAL at 16:39

## 2024-08-03 RX ADMIN — MIDODRINE HYDROCHLORIDE 2.5 MG: 2.5 TABLET ORAL at 11:05

## 2024-08-03 RX ADMIN — FERROUS SULFATE TAB 325 MG (65 MG ELEMENTAL FE) 325 MG: 325 (65 FE) TAB at 08:00

## 2024-08-03 RX ADMIN — BUSPIRONE HYDROCHLORIDE 2.5 MG: 5 TABLET ORAL at 10:00

## 2024-08-03 RX ADMIN — MIDODRINE HYDROCHLORIDE 2.5 MG: 2.5 TABLET ORAL at 15:25

## 2024-08-03 RX ADMIN — POTASSIUM CHLORIDE 40 MEQ: 1500 TABLET, EXTENDED RELEASE ORAL at 12:23

## 2024-08-03 RX ADMIN — ACETAMINOPHEN 975 MG: 325 TABLET, FILM COATED ORAL at 21:09

## 2024-08-03 RX ADMIN — ASPIRIN 81 MG: 81 TABLET, COATED ORAL at 10:00

## 2024-08-03 NOTE — ASSESSMENT & PLAN NOTE
Wt Readings from Last 3 Encounters:   08/03/24 70.1 kg (154 lb 8.7 oz)   11/09/22 71.8 kg (158 lb 6.4 oz)   08/11/21 77.7 kg (171 lb 3.2 oz)     Chest x-ray with pleural effusions as well as pulmonary edema  EF 70%, severely dilated bilateral atrium, severe mitral valve regurgitation, severe tricuspid valve regurgitation  Despite effusions, patient is asymptomatic and maintaining saturations without issue  Goal for conservative therapy per family  Discussed with cardiology  Will transition to oral torsemide 20 twice daily

## 2024-08-03 NOTE — ASSESSMENT & PLAN NOTE
89-year-old female presented with altered mental status, falls  Presented from nursing home, history of dementia with confusion at baseline.  History of multiple falls in the past  Suspect secondary to UTI, hospital delirium   Appreciate geriatrics recommendations  Delirium precautions  Much improved status, conversing was alert and oriented x 2 for me.  Continue to monitor off benzodiazepines  PT/OT-plan to return to facility in the next 24 to 48 hours

## 2024-08-03 NOTE — PROGRESS NOTES
[facility]  Progress Note  Name: Adrianna Macias I  MRN: [id number]  Unit/Bed#: E2 -01 I Date of Admission: 7/28/2024   Date of Service: 8/3/2024 I Hospital Day: 6    Assessment & Plan   * Metabolic encephalopathy  Assessment & Plan  89-year-old female presented with altered mental status, falls  Presented from nursing home, history of dementia with confusion at baseline.  History of multiple falls in the past  Suspect secondary to UTI, hospital delirium   Appreciate geriatrics recommendations  Delirium precautions  Much improved status, conversing was alert and oriented x 2 for me.  Continue to monitor off benzodiazepines  PT/OT-plan to return to facility in the next 24 to 48 hours    Goals of care, counseling/discussion  Assessment & Plan  Discussed goals of care with Cipriano/spouse   They wish for conservative measures and for her to return home to her facility and establish with palliative care.  Do not wish for patient to return to the hospital.  Case management assisted family with filling out POLST form this was signed and placed in chart.    Acute diastolic heart failure (HCC)  Assessment & Plan  Wt Readings from Last 3 Encounters:   08/03/24 70.1 kg (154 lb 8.7 oz)   11/09/22 71.8 kg (158 lb 6.4 oz)   08/11/21 77.7 kg (171 lb 3.2 oz)     Chest x-ray with pleural effusions as well as pulmonary edema  EF 70%, severely dilated bilateral atrium, severe mitral valve regurgitation, severe tricuspid valve regurgitation  Despite effusions, patient is asymptomatic and maintaining saturations without issue  Goal for conservative therapy per family  Discussed with cardiology  Will transition to oral torsemide 20 twice daily          Fall  Assessment & Plan  Presented with multiple falls   Recent ED visit for fall   PT/OT-return to facility with services    Atrial fibrillation (HCC)  Assessment & Plan  Patient presents with atrial flutter with RVR.  Suspect secondary to  infection  Rates adequately controlled  Continue metoprolol and Cardizem    Anemia  Assessment & Plan  Hemoglobin 7.8 on admission, previous baseline appears to be 9-10  Recent fall with multiple facial ecchymosis, scalp hematoma  Remained stable, no signs of active bleeding  B12, folic acid within normal limits  Continue iron tablets     UTI (urinary tract infection)  Assessment & Plan  UA concerning for infection, reporting urinary frequency and incontinence  Cultures return  Patient completed adequate course of IV Rocephin    Hypertension  Assessment & Plan  Continue metoprolol and Cardizem  Hold amlodipine and lisinopril     History of CVA (cerebrovascular accident)  Assessment & Plan  History of embolic CVA, continue aspirin, statin,  Given frequent falls, may need to weigh risk benefits of continuing Eliquis.  Follow-up with PCP.  Given high [id number] score, will continue for now    History of seizure  Assessment & Plan  Continue Tegretol 200 mg twice daily           VTE Pharmacologic Prophylaxis: apixaban     Patient Centered Rounds:  Patient care rounds were performed with nursing    Discussions with Specialists or Other Care Team Provider: Independently reviewed case with cardiology    Education and Discussions with Family / Patient: Goals of care discussion of POLST form with Cipriano    Time Spent for Care: I have spent a total time of 51 minutes on 08/03/24 in caring for this patient including Diagnostic results, Prognosis, Risks and benefits of tx options, Instructions for management, Patient and family education, Importance of tx compliance, Impressions, Counseling / Coordination of care, Reviewing / ordering tests, medicine, procedures  , Obtaining or reviewing history  , and Communicating with other healthcare professionals .      Current Length of Stay: 6 day(s)    Current Patient Status: Inpatient   Certification Statement: The patient will continue to require additional inpatient hospital stay  due to management of A-fib, heart failure, encephalopathy    Discharge Plan: Return to facility with palliative care    Code Status: Level 3 - DNAR and DNI      Subjective:   Patient seen and evaluated at bedside.  Awake and conversant    Objective:     Vitals:   Temp (24hrs), Av.9 °F (36.6 °C), Min:97 °F (36.1 °C), Max:98.9 °F (37.2 °C)    Temp:  [97 °F (36.1 °C)-98.9 °F (37.2 °C)] 98.1 °F (36.7 °C)  HR:  [] 102  Resp:  [17-18] 18  BP: (103-117)/(51-79) 117/67  SpO2:  [92 %-98 %] 97 %  Body mass index is 28.27 kg/m².     Input and Output Summary (last 24 hours):       Intake/Output Summary (Last 24 hours) at 8/3/2024 1710  Last data filed at 2024 2205  Gross per 24 hour   Intake --   Output 1126 ml   Net -1126 ml       Physical Exam:     Physical Exam  Vitals reviewed.   Constitutional:       General: She is not in acute distress.     Appearance: She is well-developed. She is not ill-appearing, toxic-appearing or diaphoretic.   HENT:      Head: Normocephalic and atraumatic.      Mouth/Throat:      Mouth: Mucous membranes are moist.   Eyes:      General: No scleral icterus.     Extraocular Movements: Extraocular movements intact.   Cardiovascular:      Rate and Rhythm: Normal rate. Rhythm irregular.      Heart sounds: Normal heart sounds.   Pulmonary:      Effort: Pulmonary effort is normal. No respiratory distress.      Breath sounds: No wheezing.      Comments: Decreased breath sounds in bases  Abdominal:      General: There is no distension.      Palpations: Abdomen is soft.      Tenderness: There is no abdominal tenderness. There is no guarding or rebound.   Musculoskeletal:         General: No swelling, tenderness or deformity.   Skin:     General: Skin is warm and dry.   Neurological:      Mental Status: She is alert.      Comments: Alert and oriented x 2         Additional Data:     Labs: I have reviewed pertinent results     Results from last 7 days   Lab Units 24  0948 24  0512  07/29/24  0554   WBC Thousand/uL 10.72*   < > 8.99   HEMOGLOBIN g/dL 10.7*   < > 7.8*   HEMATOCRIT % 38.2   < > 26.7*   PLATELETS Thousands/uL 399*   < > 262   SEGS PCT %  --   --  76*   LYMPHO PCT %  --   --  13*   MONO PCT %  --   --  11   EOS PCT %  --   --  0    < > = values in this interval not displayed.     Results from last 7 days   Lab Units 08/03/24  0948   SODIUM mmol/L 142   POTASSIUM mmol/L 3.3*   CHLORIDE mmol/L 97   CO2 mmol/L 37*   BUN mg/dL 26*   CREATININE mg/dL 1.18   ANION GAP mmol/L 8   CALCIUM mg/dL 9.2   GLUCOSE RANDOM mg/dL 213*         Results from last 7 days   Lab Units 07/30/24  2027   POC GLUCOSE mg/dl 148*                 Imaging: I have reviewed pertinent imaging       Recent Cultures (last 7 days):     Results from last 7 days   Lab Units 07/28/24  1332   URINE CULTURE  >100,000 cfu/ml Corynebacterium urealyticum*  40,000-49,000 cfu/ml Escherichia coli*  10,000-19,000 cfu/ml       Last 24 Hours Medication List:   Current Facility-Administered Medications   Medication Dose Route Frequency Provider Last Rate    acetaminophen  975 mg Oral Q8H [facility] Sung Lainez, DO      aluminum-magnesium hydroxide-simethicone  30 mL Oral Q6H PRN Mika Jackson MD      apixaban  5 mg Oral BID Sung Lainez DO      aspirin  81 mg Oral Daily Mika Jackson MD      atorvastatin  80 mg Oral Daily With Dinner Mika Jackson MD      bisacodyl  10 mg Rectal Daily PRN Mika Jackson MD      busPIRone  2.5 mg Oral BID Mika Jackson MD      carBAMazepine  200 mg Oral BID Mika Jackson MD      diltiazem  120 mg Oral Daily Jordi Vela, DO      ferrous sulfate  325 mg Oral Daily With Breakfast Sung Lainez DO      melatonin  6 mg Oral HS BRITTNEY Blair      metoprolol  5 mg Intravenous Q4H PRN Sung Lainez DO      metoprolol succinate  100 mg Oral BID Jordi Vela, DO      OLANZapine  2.5 mg Intramuscular Q8H PRN Sung Lainez DO      ondansetron  4 mg  Intravenous Q6H PRN Mika Jackson MD      pantoprazole  20 mg Oral Daily Before Breakfast Mika Jackson MD      sertraline  25 mg Oral Daily Mika Jackson MD      torsemide  20 mg Oral BID Sung Lainez DO          Today, Patient Was Seen By: Sung Lainez DO    ** Please Note: Dictation voice to text software may have been used in the creation of this document. **

## 2024-08-03 NOTE — PROGRESS NOTES
Cardiology Progress Note   MD Wilson Richardson MD, [facility]  Jordi Vela DO, [facility]  MD Marry Rivera DO, [facility]  Cipriano Oswald DO, [facility]  ----------------------------------------------------------------  [address] [address]    Adrianna Macias 89 y.o. 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, October 2018  Severe mitral regurgitation  Moderate to severe tricuspid regurgitation  Mild mitral stenosis with mean PG 6 mmHg at 96 bpm and MVA 2.29 cm² by PHT, July 2024  Hypertension  LVEF 70%, moderate LVH, severe biatrial dilatation, AV sclerosis, mild AR, mild MS, severe MR, moderate to severe TR with PASP 56 mmHg, July 2024  History of seizure disorder  Urinary tract infection  Anemia    PLAN:  Patient remains on room air with evidence of pulmonary edema on chest x-ray yesterday  Placed on diuretic drip with minimal improvement in weights  Continue Lasix drip at 15 mg/h  Strict I's/O's and daily weights  Keep potassium greater than 4 and magnesium greater than 2  Continue amlodipine, metoprolol, aspirin and high intensity statin  Will continue with rate control for management of AF given patient's age and cognitive status  Will start diltiazem 120 mg daily of the extended release formulation  Will check COVID swab for completeness given airspace disease despite aggressive diuresis  Repeat chest x-ray today  Hopeful transition oral diuretic tomorrow    Signed: Jordi Vela DO, [facility] EVANGELINA LOVETT, FACP      History of Present Illness:  Patient seen and examined.  She is confused this morning  Sitting up in bed today eating breakfast.      Review of Systems:  Review of systems unobtainable secondary to mental status.    Allergies   Allergen  "Reactions    Citalopram      Other reaction(s): Other (See Comments)  insomnia    Dextrose 5%-Electrolyte #48     Levofloxacin      Other reaction(s): Other (See Comments)  \"feels lousey\"    Morphine Other (See Comments)     unknown    Nitrofurantoin Other (See Comments)     diarrhea    Oxycodone Other (See Comments)     unknown    Paroxetine Other (See Comments)     nausea    Pseudoephedrine Other (See Comments)     insomnia       No current facility-administered medications on file prior to encounter.     Current Outpatient Medications on File Prior to Encounter   Medication Sig    Acetaminophen 500 MG Take 500 mg by mouth every 4 (four) hours as needed    amLODIPine (NORVASC) 5 mg tablet Take 5 mg by mouth    apixaban (ELIQUIS) 5 mg Take 5 mg by mouth 2 (two) times a day     aspirin (ECOTRIN LOW STRENGTH) 81 mg EC tablet Take 81 mg by mouth daily    atorvastatin (LIPITOR) 80 mg tablet Take 80 mg by mouth    bisacodyl (DULCOLAX) 10 mg suppository Insert 10 mg into the rectum daily as needed    busPIRone (BUSPAR) 5 mg tablet Take 2.5 mg by mouth 2 (two) times a day    calcium-vitamin D 250-100 MG-UNIT per tablet Take 1 tablet by mouth daily     carBAMazepine (CARBATROL) 200 mg 12 hr capsule TAKE ONE CAPSULE BY MOUTH TWICE DAILY    Cholecalciferol 2000 units CAPS Take 1 capsule by mouth 2 (two) times a day     glycerin adult 2 g suppository Insert 1 suppository into the rectum    hydrochlorothiazide (MICROZIDE) 12.5 mg capsule Take 12.5 mg by mouth (Patient not taking: Reported on 8/11/2021)    lisinopril (ZESTRIL) 20 mg tablet Take 20 mg by mouth daily    LORazepam (ATIVAN) 0.5 mg tablet Take 0.5 mg by mouth daily at bedtime    Magnesium Hydroxide (MILK OF MAGNESIA PO) Take by mouth    Melatonin 5 MG TABS Take 5 mg by mouth daily at bedtime    metFORMIN (GLUCOPHAGE-XR) 500 mg 24 hr tablet  (Patient not taking: Reported on 7/9/2024)    METOPROLOL SUCCINATE ER PO Take 50 mg by mouth daily    multivitamin (THERAGRAN) " TABS Take 1 tablet by mouth    omeprazole (PriLOSEC) 20 mg delayed release capsule Take 20 mg by mouth 2 (two) times a day     ondansetron (ZOFRAN) 4 mg tablet Take 4 mg by mouth daily as needed    sertraline (ZOLOFT) 25 mg tablet Take 25 mg by mouth    trimethoprim (PROLOPRIM) 100 mg tablet Take 100 mg by mouth (Patient not taking: Reported on 7/9/2024)        Current Facility-Administered Medications   Medication Dose Route Frequency Provider Last Rate    acetaminophen  975 mg Oral Q8H [facility] Sung Lainez, DO      aluminum-magnesium hydroxide-simethicone  30 mL Oral Q6H PRN Mika Jackson MD      apixaban  5 mg Oral BID Sung Lainez DO      aspirin  81 mg Oral Daily Mika Jackson MD      atorvastatin  80 mg Oral Daily With Dinner Mika Jackson MD      bisacodyl  10 mg Rectal Daily PRN Mika Jackson MD      busPIRone  2.5 mg Oral BID Mika Jackson MD      carBAMazepine  200 mg Oral BID Mika Jackson MD      ferrous sulfate  325 mg Oral Daily With Breakfast Sung Lainez DO      furosemide  15 mg/hr Intravenous Continuous Jordi Vela, DO 15 mg/hr (08/02/24 1940)    melatonin  6 mg Oral HS BRITTNEY Blair      metoprolol  5 mg Intravenous Q4H PRN Sung Lainez DO      metoprolol succinate  100 mg Oral BID Jordi Vela, DO      midodrine  2.5 mg Oral TID AC Sung Lainez DO      OLANZapine  2.5 mg Intramuscular Q8H PRN Sung Lainez DO      ondansetron  4 mg Intravenous Q6H PRN Mika Jackson MD      pantoprazole  20 mg Oral Daily Before Breakfast Mika Jackson MD      sertraline  25 mg Oral Daily Mika Jackson MD         furosemide, 15 mg/hr, Last Rate: 15 mg/hr (08/02/24 1940)        Vitals:    08/02/24 1940 08/02/24 2158 08/03/24 0600 08/03/24 0738   BP: 117/51 106/69  103/71   BP Location: Right arm Left arm  Right arm   Pulse: 103 (!) 116  69   Resp: 18 18  17   Temp: 97.6 °F (36.4 °C) (!) 97 °F (36.1 °C)  97.7 °F (36.5 °C)   TempSrc: Temporal  "Temporal  Temporal   SpO2: 94% 95%  92%   Weight:   70.1 kg (154 lb 8.7 oz)    Height:         Body mass index is 28.27 kg/m².      Intake/Output Summary (Last 24 hours) at 8/3/2024 0804  Last data filed at 8/2/2024 2205  Gross per 24 hour   Intake 360 ml   Output 1126 ml   Net -766 ml       Weight change:     PHYSICAL EXAMINATION:  Gen: Awake, Alert, NAD  Head/eyes: Significant facial ecchymoses, pupils equal and round, Anicteric  ENT: mmm  Neck: Supple, No elevated JVP, trachea midline  Resp: CTA bilaterally no w/r/r  CV: irreg irreg +S1, S2, No m/r/g  Abd: Soft, NT/ND + BS  Ext: no LE edema bilaterally  Neuro: Follows commands, moves all extermities  Psych: Blunted affect, pleasant mood, cooperative attitude, non-combative  Skin: warm; no rash, erythema or venous stasis changes on exposed skin    Lab Results:  Results from last 7 days   Lab Units 08/02/24  1122   WBC Thousand/uL 7.05   HEMOGLOBIN g/dL 8.7*   HEMATOCRIT % 29.8*   PLATELETS Thousands/uL 313     Results from last 7 days   Lab Units 08/02/24  1122   POTASSIUM mmol/L 3.2*   CHLORIDE mmol/L 103   CO2 mmol/L 34*   BUN mg/dL 30*   CREATININE mg/dL 1.15   CALCIUM mg/dL 8.1*     No results found for: \"TROPONINT\"                Tele: AF w/ RVR 90s to 120s    This note was completed in part utilizing M-Modal Fluency Direct Software.  Grammatical errors, random word insertions, spelling mistakes, and incomplete sentences may be an occasional consequence of this system secondary to software limitations, ambient noise, and hardware issues.  If you have any questions or concerns about the content, text, or information contained within the body of this dictation, please contact the provider for clarification.      "

## 2024-08-03 NOTE — ASSESSMENT & PLAN NOTE
History of embolic CVA, continue aspirin, statin,  Given frequent falls, may need to weigh risk benefits of continuing Eliquis.  Follow-up with PCP.  Given high [id number] score, will continue for now

## 2024-08-03 NOTE — QUICK NOTE
Plan is for patient to be discharged on hospice.  Discontinue Lasix drip and start torsemide 20 mg twice daily.  Continue rate controlling agents.  Will sign off and see further as needed.  Please do not hesitate to reconsult or message with questions.   No

## 2024-08-03 NOTE — PLAN OF CARE
Problem: Potential for Falls  Goal: Patient will remain free of falls  Description: INTERVENTIONS:  - Educate patient/family on patient safety including physical limitations  - Instruct patient to call for assistance with activity   - Consult OT/PT to assist with strengthening/mobility   - Keep Call bell within reach  - Keep bed low and locked with side rails adjusted as appropriate  - Keep care items and personal belongings within reach  - Initiate and maintain comfort rounds  - Make Fall Risk Sign visible to staff  - Offer Toileting every 2 Hours, in advance of need  - Initiate/Maintain bed alarm  - Obtain necessary fall risk management equipment: yellow socks, yellow bracelet, bed alarm  - Apply yellow socks and bracelet for high fall risk patients  - Consider moving patient to room near nurses station  Outcome: Progressing     Problem: Prexisting or High Potential for Compromised Skin Integrity  Goal: Skin integrity is maintained or improved  Description: INTERVENTIONS:  - Identify patients at risk for skin breakdown  - Assess and monitor skin integrity  - Assess and monitor nutrition and hydration status  - Monitor labs   - Assess for incontinence   - Turn and reposition patient  - Assist with mobility/ambulation  - Relieve pressure over bony prominences  - Avoid friction and shearing  - Provide appropriate hygiene as needed including keeping skin clean and dry  - Evaluate need for skin moisturizer/barrier cream  - Collaborate with interdisciplinary team   - Patient/family teaching  - Consider wound care consult   Outcome: Progressing     Problem: PAIN - ADULT  Goal: Verbalizes/displays adequate comfort level or baseline comfort level  Description: Interventions:  - Encourage patient to monitor pain and request assistance  - Assess pain using appropriate pain scale  - Administer analgesics based on type and severity of pain and evaluate response  - Implement non-pharmacological measures as appropriate and  evaluate response  - Consider cultural and social influences on pain and pain management  - Notify physician/advanced practitioner if interventions unsuccessful or patient reports new pain  Outcome: Progressing     Problem: INFECTION - ADULT  Goal: Absence or prevention of progression during hospitalization  Description: INTERVENTIONS:  - Assess and monitor for signs and symptoms of infection  - Monitor lab/diagnostic results  - Monitor all insertion sites, i.e. indwelling lines, tubes, and drains  - Monitor endotracheal if appropriate and nasal secretions for changes in amount and color  - [place] appropriate cooling/warming therapies per order  - Administer medications as ordered  - Instruct and encourage patient and family to use good hand hygiene technique  - Identify and instruct in appropriate isolation precautions for identified infection/condition  Outcome: Progressing     Problem: SAFETY ADULT  Goal: Patient will remain free of falls  Description: INTERVENTIONS:  - Educate patient/family on patient safety including physical limitations  - Instruct patient to call for assistance with activity   - Consult OT/PT to assist with strengthening/mobility   - Keep Call bell within reach  - Keep bed low and locked with side rails adjusted as appropriate  - Keep care items and personal belongings within reach  - Initiate and maintain comfort rounds  - Make Fall Risk Sign visible to staff  - Offer Toileting every 2 Hours, in advance of need  - Initiate/Maintain bed alarm  - Obtain necessary fall risk management equipment: yellow socks, yellow bracelet, bed alarm  - Apply yellow socks and bracelet for high fall risk patients  - Consider moving patient to room near nurses station  Outcome: Progressing     Problem: DISCHARGE PLANNING  Goal: Discharge to home or other facility with appropriate resources  Description: INTERVENTIONS:  - Identify barriers to discharge w/patient and caregiver  - Arrange for needed discharge  resources and transportation as appropriate  - Identify discharge learning needs (meds, wound care, etc.)  - Arrange for interpretive services to assist at discharge as needed  - Refer to Case Management Department for coordinating discharge planning if the patient needs post-hospital services based on physician/advanced practitioner order or complex needs related to functional status, cognitive ability, or social support system  Outcome: Progressing     Problem: Knowledge Deficit  Goal: Patient/family/caregiver demonstrates understanding of disease process, treatment plan, medications, and discharge instructions  Description: Complete learning assessment and assess knowledge base.  Interventions:  - Provide teaching at level of understanding  - Provide teaching via preferred learning methods  Outcome: Progressing     Problem: GENITOURINARY - ADULT  Goal: Maintains or returns to baseline urinary function  Description: INTERVENTIONS:  - Assess urinary function  - Encourage oral fluids to ensure adequate hydration if ordered  - Administer IV fluids as ordered to ensure adequate hydration  - Administer ordered medications as needed  - Offer frequent toileting  - Follow urinary retention protocol if ordered  Outcome: Progressing     Problem: SKIN/TISSUE INTEGRITY - ADULT  Goal: Skin Integrity remains intact(Skin Breakdown Prevention)  Description: Assess:  -Perform Willy assessment every shift  -Clean and moisturize skin  -Inspect skin when repositioning, toileting, and assisting with ADLS  -Assess under medical devices   -Assess extremities for adequate circulation and sensation     Bed Management:  -Have minimal linens on bed & keep smooth, unwrinkled  -Change linens as needed when moist or perspiring  -Avoid sitting or lying in one position for more than 2 hours while in bed  -Keep HOB at 30 degrees     Toileting:  -Offer bedside commode  -Assess for incontinence every 2 hours  -Use incontinent care products after  each incontinent episode    Activity:  -Mobilize patient 3 times a day  -Encourage activity and walks on unit  -Encourage or provide ROM exercises   -Turn and reposition patient every 2 Hours  -Use appropriate equipment to lift or move patient in bed  -Instruct/ Assist with weight shifting when out of bed in chair  -Consider limitation of chair time 1/2 hour intervals    Skin Care:  -Avoid use of baby powder, tape, friction and shearing, hot water or constrictive clothing  -Relieve pressure over bony prominences using allevyn  -Do not massage red bony areas    Next Steps:  -Teach patient strategies to minimize risks such as falls   -Consider consults to  interdisciplinary teams such as PT, OT  Outcome: Progressing     Problem: MUSCULOSKELETAL - ADULT  Goal: Maintain or return mobility to safest level of function  Description: INTERVENTIONS:  - Assess patient's ability to carry out ADLs; assess patient's baseline for ADL function and identify physical deficits which impact ability to perform ADLs (bathing, care of mouth/teeth, toileting, grooming, dressing, etc.)  - Assess/evaluate cause of self-care deficits   - Assess range of motion  - Assess patient's mobility  - Assess patient's need for assistive devices and provide as appropriate  - Encourage maximum independence but intervene and supervise when necessary  - Involve family in performance of ADLs  - Assess for home care needs following discharge   - Consider OT consult to assist with ADL evaluation and planning for discharge  - Provide patient education as appropriate  Outcome: Progressing     Problem: SAFETY,RESTRAINT: NV/NON-SELF DESTRUCTIVE BEHAVIOR  Goal: Remains free of harm/injury (restraint for non violent/non self-detsructive behavior)  Description: INTERVENTIONS:  - Instruct patient/family regarding restraint use   - Assess and monitor physiologic and psychological status   - Provide interventions and comfort measures to meet assessed patient needs   -  Identify and implement measures to help patient regain control  - Assess readiness for release of restraint   Outcome: Progressing  Goal: Returns to optimal restraint-free functioning  Description: INTERVENTIONS:  - Assess the patient's behavior and symptoms that indicate continued need for restraint  - Identify and implement measures to help patient regain control  - Assess readiness for release of restraint   Outcome: Progressing     Problem: NEUROSENSORY - ADULT  Goal: Achieves maximal functionality and self care  Description: INTERVENTIONS  - Monitor swallowing and airway patency with patient fatigue and changes in neurological status  - Encourage and assist patient to increase activity and self care.   - Encourage visually impaired, hearing impaired and aphasic patients to use assistive/communication devices  Outcome: Progressing     Problem: CARDIOVASCULAR - ADULT  Goal: Maintains optimal cardiac output and hemodynamic stability  Description: INTERVENTIONS:  - Monitor I/O, vital signs and rhythm  - Monitor for S/S and trends of decreased cardiac output  - Administer and titrate ordered vasoactive medications to optimize hemodynamic stability  - Assess quality of pulses, skin color and temperature  - Assess for signs of decreased coronary artery perfusion  - Instruct patient to report change in severity of symptoms  Outcome: Progressing     Problem: Nutrition/Hydration-ADULT  Goal: Nutrient/Hydration intake appropriate for improving, restoring or maintaining nutritional needs  Description: Monitor and assess patient's nutrition/hydration status for malnutrition. Collaborate with interdisciplinary team and initiate plan and interventions as ordered.  Monitor patient's weight and dietary intake as ordered or per policy. Utilize nutrition screening tool and intervene as necessary. Determine patient's food preferences and provide high-protein, high-caloric foods as appropriate.     INTERVENTIONS:  - Monitor oral  intake, urinary output, labs, and treatment plans  - Assess nutrition and hydration status and recommend course of action  - Evaluate amount of meals eaten  - Assist patient with eating if necessary   - Allow adequate time for meals  - Recommend/ encourage appropriate diets, oral nutritional supplements, and vitamin/mineral supplements  - Order, calculate, and assess calorie counts as needed  - Recommend, monitor, and adjust tube feedings and TPN/PPN based on assessed needs  - Assess need for intravenous fluids  - Provide specific nutrition/hydration education as appropriate  - Include patient/family/caregiver in decisions related to nutrition  Outcome: Progressing

## 2024-08-03 NOTE — ASSESSMENT & PLAN NOTE
Patient presents with atrial flutter with RVR.  Suspect secondary to infection  Rates adequately controlled  Continue metoprolol and Cardizem

## 2024-08-03 NOTE — PLAN OF CARE
Problem: Potential for Falls  Goal: Patient will remain free of falls  Description: INTERVENTIONS:  - Educate patient/family on patient safety including physical limitations  - Instruct patient to call for assistance with activity   - Consult OT/PT to assist with strengthening/mobility   - Keep Call bell within reach  - Keep bed low and locked with side rails adjusted as appropriate  - Keep care items and personal belongings within reach  - Initiate and maintain comfort rounds  - Make Fall Risk Sign visible to staff  - Offer Toileting every 2 Hours, in advance of need  - Initiate/Maintain bed alarm  - Obtain necessary fall risk management equipment: yellow socks, yellow bracelet, bed alarm  - Apply yellow socks and bracelet for high fall risk patients  - Consider moving patient to room near nurses station  Outcome: Progressing     Problem: Prexisting or High Potential for Compromised Skin Integrity  Goal: Skin integrity is maintained or improved  Description: INTERVENTIONS:  - Identify patients at risk for skin breakdown  - Assess and monitor skin integrity  - Assess and monitor nutrition and hydration status  - Monitor labs   - Assess for incontinence   - Turn and reposition patient  - Assist with mobility/ambulation  - Relieve pressure over bony prominences  - Avoid friction and shearing  - Provide appropriate hygiene as needed including keeping skin clean and dry  - Evaluate need for skin moisturizer/barrier cream  - Collaborate with interdisciplinary team   - Patient/family teaching  - Consider wound care consult   Outcome: Progressing     Problem: PAIN - ADULT  Goal: Verbalizes/displays adequate comfort level or baseline comfort level  Description: Interventions:  - Encourage patient to monitor pain and request assistance  - Assess pain using appropriate pain scale  - Administer analgesics based on type and severity of pain and evaluate response  - Implement non-pharmacological measures as appropriate and  evaluate response  - Consider cultural and social influences on pain and pain management  - Notify physician/advanced practitioner if interventions unsuccessful or patient reports new pain  Outcome: Progressing     Problem: INFECTION - ADULT  Goal: Absence or prevention of progression during hospitalization  Description: INTERVENTIONS:  - Assess and monitor for signs and symptoms of infection  - Monitor lab/diagnostic results  - Monitor all insertion sites, i.e. indwelling lines, tubes, and drains  - Monitor endotracheal if appropriate and nasal secretions for changes in amount and color  - [place] appropriate cooling/warming therapies per order  - Administer medications as ordered  - Instruct and encourage patient and family to use good hand hygiene technique  - Identify and instruct in appropriate isolation precautions for identified infection/condition  Outcome: Progressing     Problem: SAFETY ADULT  Goal: Patient will remain free of falls  Description: INTERVENTIONS:  - Educate patient/family on patient safety including physical limitations  - Instruct patient to call for assistance with activity   - Consult OT/PT to assist with strengthening/mobility   - Keep Call bell within reach  - Keep bed low and locked with side rails adjusted as appropriate  - Keep care items and personal belongings within reach  - Initiate and maintain comfort rounds  - Make Fall Risk Sign visible to staff  - Offer Toileting every 2 Hours, in advance of need  - Initiate/Maintain bed alarm  - Obtain necessary fall risk management equipment: yellow socks, yellow bracelet, bed alarm  - Apply yellow socks and bracelet for high fall risk patients  - Consider moving patient to room near nurses station  Outcome: Progressing     Problem: DISCHARGE PLANNING  Goal: Discharge to home or other facility with appropriate resources  Description: INTERVENTIONS:  - Identify barriers to discharge w/patient and caregiver  - Arrange for needed discharge  resources and transportation as appropriate  - Identify discharge learning needs (meds, wound care, etc.)  - Arrange for interpretive services to assist at discharge as needed  - Refer to Case Management Department for coordinating discharge planning if the patient needs post-hospital services based on physician/advanced practitioner order or complex needs related to functional status, cognitive ability, or social support system  Outcome: Progressing

## 2024-08-03 NOTE — CASE MANAGEMENT
Case Management Assessment & Discharge Planning Note    Patient name Adrianna Macias  Location East 2 /E2 -* MRN [id number]  : 1935 Date 8/3/2024       Current Admission Date: 2024  Current Admission Diagnosis:Metabolic encephalopathy   Patient Active Problem List    Diagnosis Date Noted Date Diagnosed    Fall 2024     Acute diastolic heart failure (HCC) 2024     Atrial fibrillation (HCC) 2024     Hypertension 2024     Metabolic encephalopathy 2024     UTI (urinary tract infection) 2024     Anemia 2024     Migraine without aura and without status migrainosus, not intractable 2019     History of seizure 2019     History of CVA (cerebrovascular accident) 2019       LOS (days): 6  Geometric Mean LOS (GMLOS) (days): 4.9  Days to GMLOS:-1.1     OBJECTIVE:    Risk of Unplanned Readmission Score: 23.76         Current admission status: Inpatient       Preferred Pharmacy:   [id number] - [place], [place] - [address] [place], [place] [address] [address] [place] [address]  Phone: [phone] Fax: [phone]    [facility] [place] [facility] [place] - [facility]  [address] [facility] [address]  Phone: [phone] Fax: [phone]    Primary Care Provider: Racquel Bryant DO    Primary Insurance: MEDICARE  Secondary Insurance: BLUE CROSS    ASSESSMENT:  Active Health Care Proxies       Colleen Lb Health Care Representative - Son   Primary Phone: [phone] (Mobile)                 Advance Directives  Does patient have Advance Directives?: Yes  Advance Directives: POLST (POLST form completed this day with pt overall being comfort measures only- form placed in blue folder to accompany pt on d/c.  Phoebe made aware of same)                                              Social Determinants of Health (SDOH)      Flowsheet Row Most Recent Value   Housing Stability    In the last 12 months, was there a  time when you were not able to pay the mortgage or rent on time? N   In the past 12 months, how many times have you moved where you were living? 0   At any time in the past 12 months, were you homeless or living in a shelter (including now)? N   Transportation Needs    In the past 12 months, has lack of transportation kept you from medical appointments or from getting medications? no   In the past 12 months, has lack of transportation kept you from meetings, work, or from getting things needed for daily living? No   Food Insecurity    Within the past 12 months, you worried that your food would run out before you got the money to buy more. Never true   Within the past 12 months, the food you bought just didn't last and you didn't have money to get more. Never true   Utilities    In the past 12 months has the electric, gas, oil, or water company threatened to shut off services in your home? No            DISCHARGE DETAILS:    Discharge planning discussed with:: pt's son Cipriano GARCIA contacted family/caregiver?: Yes  Were Treatment Team discharge recommendations reviewed with patient/caregiver?: Yes  Did patient/caregiver verbalize understanding of patient care needs?: Yes       Contacts  Patient Contacts: Cipriano Felixffer-son  Relationship to Patient:: Family  Contact Method: In Person  Reason/Outcome: Discharge Planning    Requested Home Health Care         Is the patient interested in HHC at discharge?: No    DME Referral Provided  Referral made for DME?: No    Other Referral/Resources/Interventions Provided:  Interventions: Advanced Directives (POLST)    Would you like to participate in our [facility] service program?  : No - Declined    Treatment Team Recommendation: SNF  Discharge Destination Plan:: SNF, Facility Return (Phoebe)  Transport at Discharge : [facility] (comfort measures; confused; non-ambulatory; transfer asst x2)  Dispatcher Contacted: Yes  Number/Name of Dispatcher:  Roundtrip  Transported by (Company and Unit #): [facility]  ETA of Transport (Date): 08/04/24  ETA of Transport (Time): 1400              IMM Given (Date):: 08/03/24  IMM Given to:: Family (verbal with son Cipriano Macias)          Accepting Facility Name, City & State : [facility]  Receiving Facility/Agency Phone Number: [phone]  Facility/Agency Fax Number: [phone]

## 2024-08-03 NOTE — ASSESSMENT & PLAN NOTE
Discussed goals of care with Cipriano/spouse   They wish for conservative measures and for her to return home to her facility and establish with palliative care.  Do not wish for patient to return to the hospital.  Case management assisted family with filling out POLST form this was signed and placed in chart.   Isotretinoin Pregnancy And Lactation Text: This medication is Pregnancy Category X and is considered extremely dangerous during pregnancy. It is unknown if it is excreted in breast milk.

## 2024-08-04 VITALS
SYSTOLIC BLOOD PRESSURE: 92 MMHG | WEIGHT: 154.54 LBS | DIASTOLIC BLOOD PRESSURE: 46 MMHG | TEMPERATURE: 97.4 F | RESPIRATION RATE: 17 BRPM | HEART RATE: 107 BPM | OXYGEN SATURATION: 97 % | BODY MASS INDEX: 28.44 KG/M2 | HEIGHT: 62 IN

## 2024-08-04 PROCEDURE — 99239 HOSP IP/OBS DSCHRG MGMT >30: CPT | Performed by: INTERNAL MEDICINE

## 2024-08-04 RX ORDER — POTASSIUM CHLORIDE 20 MEQ/1
20 TABLET, EXTENDED RELEASE ORAL DAILY
Start: 2024-08-05

## 2024-08-04 RX ORDER — DILTIAZEM HYDROCHLORIDE 120 MG/1
120 CAPSULE, COATED, EXTENDED RELEASE ORAL DAILY
Start: 2024-08-05

## 2024-08-04 RX ORDER — METOPROLOL SUCCINATE 100 MG/1
100 TABLET, EXTENDED RELEASE ORAL 2 TIMES DAILY
Qty: 60 TABLET | Refills: 0
Start: 2024-08-04

## 2024-08-04 RX ORDER — MIDODRINE HYDROCHLORIDE 2.5 MG/1
2.5 TABLET ORAL
Status: DISCONTINUED | OUTPATIENT
Start: 2024-08-04 | End: 2024-08-04 | Stop reason: HOSPADM

## 2024-08-04 RX ORDER — POTASSIUM CHLORIDE 20 MEQ/1
20 TABLET, EXTENDED RELEASE ORAL DAILY
Status: DISCONTINUED | OUTPATIENT
Start: 2024-08-04 | End: 2024-08-04 | Stop reason: HOSPADM

## 2024-08-04 RX ORDER — TORSEMIDE 20 MG/1
20 TABLET ORAL 2 TIMES DAILY
Start: 2024-08-04

## 2024-08-04 RX ORDER — MIDODRINE HYDROCHLORIDE 2.5 MG/1
2.5 TABLET ORAL
Start: 2024-08-04

## 2024-08-04 RX ADMIN — SERTRALINE HYDROCHLORIDE 25 MG: 25 TABLET ORAL at 09:47

## 2024-08-04 RX ADMIN — ACETAMINOPHEN 975 MG: 325 TABLET, FILM COATED ORAL at 06:19

## 2024-08-04 RX ADMIN — MIDODRINE HYDROCHLORIDE 2.5 MG: 2.5 TABLET ORAL at 09:47

## 2024-08-04 RX ADMIN — APIXABAN 5 MG: 5 TABLET, FILM COATED ORAL at 09:47

## 2024-08-04 RX ADMIN — FERROUS SULFATE TAB 325 MG (65 MG ELEMENTAL FE) 325 MG: 325 (65 FE) TAB at 09:47

## 2024-08-04 RX ADMIN — CARBAMAZEPINE 200 MG: 200 TABLET ORAL at 09:48

## 2024-08-04 RX ADMIN — MIDODRINE HYDROCHLORIDE 2.5 MG: 2.5 TABLET ORAL at 13:12

## 2024-08-04 RX ADMIN — POTASSIUM CHLORIDE 20 MEQ: 1500 TABLET, EXTENDED RELEASE ORAL at 09:47

## 2024-08-04 RX ADMIN — PANTOPRAZOLE SODIUM 20 MG: 20 TABLET, DELAYED RELEASE ORAL at 06:19

## 2024-08-04 RX ADMIN — BUSPIRONE HYDROCHLORIDE 2.5 MG: 5 TABLET ORAL at 09:48

## 2024-08-04 RX ADMIN — ASPIRIN 81 MG: 81 TABLET, COATED ORAL at 09:48

## 2024-08-04 RX ADMIN — ACETAMINOPHEN 975 MG: 325 TABLET, FILM COATED ORAL at 13:12

## 2024-08-04 NOTE — ASSESSMENT & PLAN NOTE
89-year-old female presented with altered mental status, falls  Presented from nursing home, history of dementia with confusion at baseline.  History of multiple falls in the past  Suspect secondary to UTI, hospital delirium, polypharmacy   Appreciate geriatrics recommendations  Delirium precautions  Much improved status, conversing was alert and oriented x 2 for me.  Continue to monitor off benzodiazepines  PT/Ot-return to facility  Family wishes for conservative measures and for her not to return to the hospital

## 2024-08-04 NOTE — ASSESSMENT & PLAN NOTE
Wt Readings from Last 3 Encounters:   08/03/24 70.1 kg (154 lb 8.7 oz)   11/09/22 71.8 kg (158 lb 6.4 oz)   08/11/21 77.7 kg (171 lb 3.2 oz)     Chest x-ray with pleural effusions as well as pulmonary edema  EF 70%, severely dilated bilateral atrium, severe mitral valve regurgitation, severe tricuspid valve regurgitation  Despite effusions, patient is asymptomatic and maintaining saturations without issue  Goal for conservative therapy per family  Discussed with cardiology  Will transition to oral torsemide 20 twice daily plus daily potassium   Continue midodrine 2.5 TID to assist with diuresis

## 2024-08-04 NOTE — DISCHARGE SUMMARY
[facility]  Discharge- Adrianna Macias 1935, 89 y.o. female MRN: [id number]  Unit/Bed#: E2 -01 Encounter: [id number]  Primary Care Provider: Racquel Bryant DO   Date and time admitted to hospital: 7/28/2024 12:52 PM    * Metabolic encephalopathy  Assessment & Plan  89-year-old female presented with altered mental status, falls  Presented from nursing home, history of dementia with confusion at baseline.  History of multiple falls in the past  Suspect secondary to UTI, hospital delirium, polypharmacy   Appreciate geriatrics recommendations  Delirium precautions  Much improved status, conversing was alert and oriented x 2 for me.  Continue to monitor off benzodiazepines  PT/Ot-return to facility  Family wishes for conservative measures and for her not to return to the hospital           Goals of care, counseling/discussion  Assessment & Plan  Discussed goals of care with Cipriano/spouse   They wish for conservative measures and for her to return home to her facility and establish with palliative care.  Do not wish for patient to return to the hospital.  Case management assisted family with filling out POLST form this was signed and placed in chart.    Acute diastolic heart failure (HCC)  Assessment & Plan  Wt Readings from Last 3 Encounters:   08/03/24 70.1 kg (154 lb 8.7 oz)   11/09/22 71.8 kg (158 lb 6.4 oz)   08/11/21 77.7 kg (171 lb 3.2 oz)     Chest x-ray with pleural effusions as well as pulmonary edema  EF 70%, severely dilated bilateral atrium, severe mitral valve regurgitation, severe tricuspid valve regurgitation  Despite effusions, patient is asymptomatic and maintaining saturations without issue  Goal for conservative therapy per family  Discussed with cardiology  Will transition to oral torsemide 20 twice daily plus daily potassium   Continue midodrine 2.5 TID to assist with diuresis           Atrial fibrillation (HCC)  Assessment & Plan  Patient presents with atrial  flutter with RVR.  Suspect secondary to infection  Rates adequately controlled  Continue metoprolol and Cardizem    Anemia  Assessment & Plan  Hemoglobin 7.8 on admission, previous baseline appears to be 9-10  Recent fall with multiple facial ecchymosis, scalp hematoma  Remained stable, no signs of active bleeding  B12, folic acid within normal limits    UTI (urinary tract infection)  Assessment & Plan  UA concerning for infection, reporting urinary frequency and incontinence  Cultures return  Patient completed adequate course of IV Rocephin    Hypertension  Assessment & Plan  Continue metoprolol and Cardizem  Hold amlodipine and lisinopril to allow for rate control     History of CVA (cerebrovascular accident)  Assessment & Plan  History of embolic CVA, continue aspirin, statin,  Given frequent falls, may need to weigh risk benefits of continuing Eliquis.  Follow-up with PCP.  Given high [id number] score, will continue for now    History of seizure  Assessment & Plan  Continue Tegretol 200 mg twice daily      Discharging Physician / Practitioner: Sung Lainez DO  PCP: Racquel Bryant DO  Admission Date:   Admission Orders (From admission, onward)       Ordered        07/28/24 1554  Inpatient Admission  Once                          Discharge Date: 08/04/24    Medical Problems       Resolved Problems  Date Reviewed: 7/28/2024   None         Consultations During Hospital Stay:   IP CONSULT TO GERONTOLOGY  IP CONSULT TO CARDIOLOGY    Procedures Performed: None    Significant Findings / Test Results:     XR chest portable    Result Date: 8/2/2024  Impression: Unchanged pulmonary edema and bilateral pleural effusions likely secondary to CHF. Workstation performed: [id number]     XR chest portable    Result Date: 8/1/2024  Impression: CHF with pleural effusions and pulmonary edema similar to prior exam Workstation performed: [id number]     XR hips bilateral 2 vw w pelvis if performed    Result Date:  7/30/2024  Impression: No acute osseous abnormality. Workstation performed: [id number]     XR chest portable    Result Date: 7/29/2024  Impression: Moderate pulmonary edema with moderate pleural effusions and bibasilar atelectasis. Workstation performed: [id number]     CT spine cervical without contrast    Result Date: 7/28/2024  Impression: No cervical spine fracture or traumatic malalignment. Moderate cervical spondylosis. Left thyroid nodule which can be further evaluated with ultrasound. Moderate bilateral pleural effusions. Workstation performed: [id number]     CT head wo contrast    Result Date: 7/28/2024  Impression: Left frontal scalp hematoma extending into the left periorbital soft tissues. Otherwise, no acute intracranial abnormality. Workstation performed: [id number]       Incidental Findings: None    Test Results Pending at Discharge (will require follow up): None     Outpatient Tests Requested: None     Reason for Admission: Acute encephalopathy    Hospital Course:     Adrianna Macias is a 89 y.o. female Patient with past medical history of dementia, atrial fibrillation on Eliquis, history of CVA, seizure disorder, hypertension who presented to the hospital with altered mental status and fall.  Patient was diagnosed with UTI and confusion was felt secondary to infection, polypharmacy, change in hospital environment.  With supportive measures patient's mental status improved throughout hospitalization.  She did need one-to-one observation and soft restraints at times.  Hospital stay was prolonged due to management of atrial fibrillation with RVR and acute diastolic heart failure.  Cardiology assisted with medical occasion titration and diuretic management.  Discussed goals of care with patient's son Cipriano.  He request for her to return to [facility] to pursue a comfort based approach with no return to the hospital.  We completed a POLST form.      Please see above list of diagnoses and related plan for  additional information.     Condition at Discharge: stable     Discharge Day Visit / Exam:     Subjective: Patient seen and evaluated at bedside.  No overnight events.    Exam:   Physical Exam  Vitals reviewed.   Constitutional:       General: She is not in acute distress.     Appearance: She is well-developed. She is not toxic-appearing or diaphoretic.      Comments: Chronically ill-appearing   HENT:      Head:      Comments: Facial ecchymosis     Mouth/Throat:      Mouth: Mucous membranes are moist.   Eyes:      General: No scleral icterus.     Extraocular Movements: Extraocular movements intact.   Cardiovascular:      Rate and Rhythm: Normal rate. Rhythm irregular.      Heart sounds: Normal heart sounds.   Pulmonary:      Effort: Pulmonary effort is normal. No respiratory distress.      Breath sounds: No wheezing.      Comments: Decreased breath sounds in bases  Abdominal:      General: There is no distension.      Palpations: Abdomen is soft.      Tenderness: There is no abdominal tenderness. There is no guarding or rebound.   Musculoskeletal:         General: No swelling, tenderness or deformity.   Skin:     General: Skin is warm and dry.   Neurological:      Mental Status: She is alert.      Comments: Aaox 2   Psychiatric:         Mood and Affect: Mood normal.         Behavior: Behavior normal.           Discharge instructions/Information to patient and family:   See after visit summary for information provided to patient and family.      Provisions for Follow-Up Care:  See after visit summary for information related to follow-up care and any pertinent home health orders.      Disposition:     Return to facility      Discharge Statement:  I spent 60 minutes discharging the patient. This time was spent on the day of discharge. I had direct contact with the patient on the day of discharge. Greater than 50% of the total time was spent examining patient, answering all patient questions, arranging and discussing  plan of care with patient as well as directly providing post-discharge instructions.  Additional time then spent on discharge activities.    Discharge Medications:  See after visit summary for reconciled discharge medications provided to patient and family.      ** Please Note: This note has been constructed using a voice recognition system **

## 2024-08-04 NOTE — CASE MANAGEMENT
Case Management Discharge Planning Note    Patient name Adrianna Macias  Location East 2 /E2 -* MRN [id number]  : 1935 Date 2024       Current Admission Date: 2024  Current Admission Diagnosis:Metabolic encephalopathy   Patient Active Problem List    Diagnosis Date Noted Date Diagnosed    Goals of care, counseling/discussion 2024     Fall 2024     Acute diastolic heart failure (HCC) 2024     Atrial fibrillation (HCC) 2024     Hypertension 2024     Metabolic encephalopathy 2024     UTI (urinary tract infection) 2024     Anemia 2024     Migraine without aura and without status migrainosus, not intractable 2019     History of seizure 2019     History of CVA (cerebrovascular accident) 2019       LOS (days): 7  Geometric Mean LOS (GMLOS) (days): 4.9  Days to GMLOS:-1.9     OBJECTIVE:  Risk of Unplanned Readmission Score: 23.57         Current admission status: Inpatient   Preferred Pharmacy:   [id number] - [place], [place] - [address]  [address] [place] [address]  Phone: [phone] Fax: [phone]    [facility] - [place], [place] - [facility] [address]  Phone: [phone] Fax: [phone]    Primary Care Provider: Racquel Bryant DO    Primary Insurance: MEDICARE  Secondary Insurance: BLUE CROSS    DISCHARGE DETAILS:                           Contacts  Patient Contacts: Cipriano Macias-son  Relationship to Patient:: Family  Contact Method: Phone  Phone Number: [phone]  Reason/Outcome: Discharge Planning                        Treatment Team Recommendation: SNF, Facility Return  Discharge Destination Plan:: SNF, Facility Return  Transport at Discharge : [facility]        Transported by (Company and Unit #): [facility]  ETA of Transport (Date): 24  ETA of Transport (Time): 1400                            Accepting  Facility Name, City & State : [facility]  Receiving Facility/Agency Phone Number: [phone]  Facility/Agency Fax Number: [phone]

## 2024-08-04 NOTE — ASSESSMENT & PLAN NOTE
Discussed goals of care with Cipriano/spouse   They wish for conservative measures and for her to return home to her facility and establish with palliative care.  Do not wish for patient to return to the hospital.  Case management assisted family with filling out POLST form this was signed and placed in chart.

## 2024-08-04 NOTE — ASSESSMENT & PLAN NOTE
Hemoglobin 7.8 on admission, previous baseline appears to be 9-10  Recent fall with multiple facial ecchymosis, scalp hematoma  Remained stable, no signs of active bleeding  B12, folic acid within normal limits

## 2024-08-04 NOTE — PLAN OF CARE
Problem: Potential for Falls  Goal: Patient will remain free of falls  Description: INTERVENTIONS:  - Educate patient/family on patient safety including physical limitations  - Instruct patient to call for assistance with activity   - Consult OT/PT to assist with strengthening/mobility   - Keep Call bell within reach  - Keep bed low and locked with side rails adjusted as appropriate  - Keep care items and personal belongings within reach  - Initiate and maintain comfort rounds  - Make Fall Risk Sign visible to staff  - Offer Toileting every 2 Hours, in advance of need  - Initiate/Maintain bed alarm  - Obtain necessary fall risk management equipment: yellow socks, yellow bracelet, bed alarm  - Apply yellow socks and bracelet for high fall risk patients  - Consider moving patient to room near nurses station  Outcome: Progressing     Problem: Prexisting or High Potential for Compromised Skin Integrity  Goal: Skin integrity is maintained or improved  Description: INTERVENTIONS:  - Identify patients at risk for skin breakdown  - Assess and monitor skin integrity  - Assess and monitor nutrition and hydration status  - Monitor labs   - Assess for incontinence   - Turn and reposition patient  - Assist with mobility/ambulation  - Relieve pressure over bony prominences  - Avoid friction and shearing  - Provide appropriate hygiene as needed including keeping skin clean and dry  - Evaluate need for skin moisturizer/barrier cream  - Collaborate with interdisciplinary team   - Patient/family teaching  - Consider wound care consult   Outcome: Progressing     Problem: PAIN - ADULT  Goal: Verbalizes/displays adequate comfort level or baseline comfort level  Description: Interventions:  - Encourage patient to monitor pain and request assistance  - Assess pain using appropriate pain scale  - Administer analgesics based on type and severity of pain and evaluate response  - Implement non-pharmacological measures as appropriate and  evaluate response  - Consider cultural and social influences on pain and pain management  - Notify physician/advanced practitioner if interventions unsuccessful or patient reports new pain  Outcome: Progressing     Problem: INFECTION - ADULT  Goal: Absence or prevention of progression during hospitalization  Description: INTERVENTIONS:  - Assess and monitor for signs and symptoms of infection  - Monitor lab/diagnostic results  - Monitor all insertion sites, i.e. indwelling lines, tubes, and drains  - Monitor endotracheal if appropriate and nasal secretions for changes in amount and color  - [place] appropriate cooling/warming therapies per order  - Administer medications as ordered  - Instruct and encourage patient and family to use good hand hygiene technique  - Identify and instruct in appropriate isolation precautions for identified infection/condition  Outcome: Progressing     Problem: SAFETY ADULT  Goal: Patient will remain free of falls  Description: INTERVENTIONS:  - Educate patient/family on patient safety including physical limitations  - Instruct patient to call for assistance with activity   - Consult OT/PT to assist with strengthening/mobility   - Keep Call bell within reach  - Keep bed low and locked with side rails adjusted as appropriate  - Keep care items and personal belongings within reach  - Initiate and maintain comfort rounds  - Make Fall Risk Sign visible to staff  - Offer Toileting every 2 Hours, in advance of need  - Initiate/Maintain bed alarm  - Obtain necessary fall risk management equipment: yellow socks, yellow bracelet, bed alarm  - Apply yellow socks and bracelet for high fall risk patients  - Consider moving patient to room near nurses station  Outcome: Progressing

## 2024-08-04 NOTE — QUICK NOTE
Notified of hypotension, patient asymptomatic      On Toprol  mg twice daily and sustained-release Cardizem.  Received all doses today.  Likely contributing to hypotension  Previously on midodrine  Asymptomatic, will hold off fluids.  Close BP monitoring for now   New onset A-fib with RVR during this admission.  In setting of significant agitation and delirium  Consider decrease dose of Toprol or change to daily dosing

## 2024-08-04 NOTE — ASSESSMENT & PLAN NOTE
History of embolic CVA, continue aspirin, statin,  Given frequent falls, may need to weigh risk benefits of continuing Eliquis.  Follow-up with PCP.  Given high [id number] score, will continue for now

## 2024-08-04 NOTE — PLAN OF CARE
Problem: Potential for Falls  Goal: Patient will remain free of falls  Description: INTERVENTIONS:  - Educate patient/family on patient safety including physical limitations  - Instruct patient to call for assistance with activity   - Consult OT/PT to assist with strengthening/mobility   - Keep Call bell within reach  - Keep bed low and locked with side rails adjusted as appropriate  - Keep care items and personal belongings within reach  - Initiate and maintain comfort rounds  - Make Fall Risk Sign visible to staff  - Offer Toileting every 2 Hours, in advance of need  - Initiate/Maintain bed alarm  - Obtain necessary fall risk management equipment: yellow socks, yellow bracelet, bed alarm  - Apply yellow socks and bracelet for high fall risk patients  - Consider moving patient to room near nurses station  Outcome: Progressing     Problem: Prexisting or High Potential for Compromised Skin Integrity  Goal: Skin integrity is maintained or improved  Description: INTERVENTIONS:  - Identify patients at risk for skin breakdown  - Assess and monitor skin integrity  - Assess and monitor nutrition and hydration status  - Monitor labs   - Assess for incontinence   - Turn and reposition patient  - Assist with mobility/ambulation  - Relieve pressure over bony prominences  - Avoid friction and shearing  - Provide appropriate hygiene as needed including keeping skin clean and dry  - Evaluate need for skin moisturizer/barrier cream  - Collaborate with interdisciplinary team   - Patient/family teaching  - Consider wound care consult   Outcome: Progressing     Problem: DISCHARGE PLANNING  Goal: Discharge to home or other facility with appropriate resources  Description: INTERVENTIONS:  - Identify barriers to discharge w/patient and caregiver  - Arrange for needed discharge resources and transportation as appropriate  - Identify discharge learning needs (meds, wound care, etc.)  - Arrange for interpretive services to assist at  discharge as needed  - Refer to Case Management Department for coordinating discharge planning if the patient needs post-hospital services based on physician/advanced practitioner order or complex needs related to functional status, cognitive ability, or social support system  Outcome: Progressing